# Patient Record
Sex: MALE | Race: WHITE | NOT HISPANIC OR LATINO | Employment: OTHER | URBAN - METROPOLITAN AREA
[De-identification: names, ages, dates, MRNs, and addresses within clinical notes are randomized per-mention and may not be internally consistent; named-entity substitution may affect disease eponyms.]

---

## 2021-05-26 ENCOUNTER — PRE-ADMISSION TESTING (OUTPATIENT)
Dept: ADMISSIONS | Facility: MEDICAL CENTER | Age: 71
End: 2021-05-26
Attending: UROLOGY
Payer: MEDICARE

## 2021-05-26 DIAGNOSIS — Z01.812 PRE-OPERATIVE LABORATORY EXAMINATION: ICD-10-CM

## 2021-05-26 DIAGNOSIS — Z01.810 PRE-OPERATIVE CARDIOVASCULAR EXAMINATION: ICD-10-CM

## 2021-05-26 LAB
ANION GAP SERPL CALC-SCNC: 8 MMOL/L (ref 7–16)
APPEARANCE UR: CLEAR
BASOPHILS # BLD AUTO: 0.6 % (ref 0–1.8)
BASOPHILS # BLD: 0.03 K/UL (ref 0–0.12)
BILIRUB UR QL STRIP.AUTO: NEGATIVE
BUN SERPL-MCNC: 11 MG/DL (ref 8–22)
CALCIUM SERPL-MCNC: 9.4 MG/DL (ref 8.5–10.5)
CHLORIDE SERPL-SCNC: 108 MMOL/L (ref 96–112)
CO2 SERPL-SCNC: 24 MMOL/L (ref 20–33)
COLOR UR: YELLOW
CREAT SERPL-MCNC: 0.69 MG/DL (ref 0.5–1.4)
EKG IMPRESSION: NORMAL
EOSINOPHIL # BLD AUTO: 0.19 K/UL (ref 0–0.51)
EOSINOPHIL NFR BLD: 3.6 % (ref 0–6.9)
ERYTHROCYTE [DISTWIDTH] IN BLOOD BY AUTOMATED COUNT: 42.4 FL (ref 35.9–50)
GLUCOSE SERPL-MCNC: 138 MG/DL (ref 65–99)
GLUCOSE UR STRIP.AUTO-MCNC: NEGATIVE MG/DL
HCT VFR BLD AUTO: 40.7 % (ref 42–52)
HGB BLD-MCNC: 13.8 G/DL (ref 14–18)
IMM GRANULOCYTES # BLD AUTO: 0.01 K/UL (ref 0–0.11)
IMM GRANULOCYTES NFR BLD AUTO: 0.2 % (ref 0–0.9)
INR PPP: 0.99 (ref 0.87–1.13)
KETONES UR STRIP.AUTO-MCNC: NEGATIVE MG/DL
LEUKOCYTE ESTERASE UR QL STRIP.AUTO: NEGATIVE
LYMPHOCYTES # BLD AUTO: 1.11 K/UL (ref 1–4.8)
LYMPHOCYTES NFR BLD: 20.8 % (ref 22–41)
MCH RBC QN AUTO: 31.9 PG (ref 27–33)
MCHC RBC AUTO-ENTMCNC: 33.9 G/DL (ref 33.7–35.3)
MCV RBC AUTO: 94 FL (ref 81.4–97.8)
MICRO URNS: NORMAL
MONOCYTES # BLD AUTO: 0.64 K/UL (ref 0–0.85)
MONOCYTES NFR BLD AUTO: 12 % (ref 0–13.4)
NEUTROPHILS # BLD AUTO: 3.35 K/UL (ref 1.82–7.42)
NEUTROPHILS NFR BLD: 62.8 % (ref 44–72)
NITRITE UR QL STRIP.AUTO: NEGATIVE
NRBC # BLD AUTO: 0 K/UL
NRBC BLD-RTO: 0 /100 WBC
PH UR STRIP.AUTO: 6 [PH] (ref 5–8)
PLATELET # BLD AUTO: 220 K/UL (ref 164–446)
PMV BLD AUTO: 10.1 FL (ref 9–12.9)
POTASSIUM SERPL-SCNC: 4.2 MMOL/L (ref 3.6–5.5)
PROT UR QL STRIP: NEGATIVE MG/DL
PROTHROMBIN TIME: 13.4 SEC (ref 12–14.6)
RBC # BLD AUTO: 4.33 M/UL (ref 4.7–6.1)
RBC UR QL AUTO: NEGATIVE
SODIUM SERPL-SCNC: 140 MMOL/L (ref 135–145)
SP GR UR STRIP.AUTO: 1.01
UROBILINOGEN UR STRIP.AUTO-MCNC: 0.2 MG/DL
WBC # BLD AUTO: 5.3 K/UL (ref 4.8–10.8)

## 2021-05-26 PROCEDURE — 81003 URINALYSIS AUTO W/O SCOPE: CPT

## 2021-05-26 PROCEDURE — 87086 URINE CULTURE/COLONY COUNT: CPT

## 2021-05-26 PROCEDURE — 93010 ELECTROCARDIOGRAM REPORT: CPT | Performed by: INTERNAL MEDICINE

## 2021-05-26 PROCEDURE — 93005 ELECTROCARDIOGRAM TRACING: CPT

## 2021-05-26 PROCEDURE — 36415 COLL VENOUS BLD VENIPUNCTURE: CPT

## 2021-05-26 PROCEDURE — 80048 BASIC METABOLIC PNL TOTAL CA: CPT

## 2021-05-26 PROCEDURE — 85610 PROTHROMBIN TIME: CPT

## 2021-05-26 PROCEDURE — 85025 COMPLETE CBC W/AUTO DIFF WBC: CPT

## 2021-05-26 RX ORDER — ATORVASTATIN CALCIUM 10 MG/1
TABLET, FILM COATED ORAL
COMMUNITY
Start: 2021-04-16

## 2021-05-26 RX ORDER — MULTIVIT-MIN/IRON/FOLIC ACID/K 18-600-40
1 CAPSULE ORAL DAILY
COMMUNITY

## 2021-05-26 RX ORDER — CLINDAMYCIN HYDROCHLORIDE 300 MG/1
CAPSULE ORAL
Status: ON HOLD | COMMUNITY
End: 2021-06-11

## 2021-05-26 RX ORDER — ACETAMINOPHEN 500 MG
500-1000 TABLET ORAL EVERY 6 HOURS PRN
COMMUNITY

## 2021-05-26 RX ORDER — IBUPROFEN 200 MG
200 TABLET ORAL EVERY 6 HOURS PRN
COMMUNITY

## 2021-05-26 RX ORDER — CIPROFLOXACIN 500 MG/1
TABLET, FILM COATED ORAL
Status: ON HOLD | COMMUNITY
End: 2021-06-11

## 2021-05-26 RX ORDER — LANOLIN ALCOHOL/MO/W.PET/CERES
1000 CREAM (GRAM) TOPICAL DAILY
COMMUNITY

## 2021-05-26 RX ORDER — TAMSULOSIN HYDROCHLORIDE 0.4 MG/1
0.8 CAPSULE ORAL DAILY
COMMUNITY
Start: 2021-05-20

## 2021-05-26 NOTE — OR NURSING
Pt has surgery 6/11, is from out of town. Discussed need for COVID test during PAT appt. Pt unaware of need for testing. Pt states he will be out of town from 6/1-6/8 roughly, will try to come in 6/8 for COVID drive thru, appt made. However, pt states he might do test out of town sooner than 6/8, pt provided with call back number to change/cxl appt and provide us with testing location info. Pt also amenable to coming to PAT 6/10 for rapid test if unable to obtain test prior.

## 2021-05-28 LAB
BACTERIA UR CULT: NORMAL
SIGNIFICANT IND 70042: NORMAL
SITE SITE: NORMAL
SOURCE SOURCE: NORMAL

## 2021-06-08 ENCOUNTER — APPOINTMENT (OUTPATIENT)
Dept: ADMISSIONS | Facility: MEDICAL CENTER | Age: 71
End: 2021-06-08
Payer: MEDICARE

## 2021-06-08 DIAGNOSIS — Z01.812 PRE-OPERATIVE LABORATORY EXAMINATION: ICD-10-CM

## 2021-06-11 ENCOUNTER — ANESTHESIA EVENT (OUTPATIENT)
Dept: SURGERY | Facility: MEDICAL CENTER | Age: 71
End: 2021-06-11
Payer: MEDICARE

## 2021-06-11 ENCOUNTER — HOSPITAL ENCOUNTER (OUTPATIENT)
Facility: MEDICAL CENTER | Age: 71
End: 2021-06-12
Attending: UROLOGY | Admitting: UROLOGY
Payer: MEDICARE

## 2021-06-11 ENCOUNTER — ANESTHESIA (OUTPATIENT)
Dept: SURGERY | Facility: MEDICAL CENTER | Age: 71
End: 2021-06-11
Payer: MEDICARE

## 2021-06-11 PROCEDURE — A4346 CATH INDW FOLEY 3 WAY: HCPCS | Performed by: UROLOGY

## 2021-06-11 PROCEDURE — A4357 BEDSIDE DRAINAGE BAG: HCPCS | Performed by: UROLOGY

## 2021-06-11 PROCEDURE — 160009 HCHG ANES TIME/MIN: Performed by: UROLOGY

## 2021-06-11 PROCEDURE — 700102 HCHG RX REV CODE 250 W/ 637 OVERRIDE(OP): Performed by: UROLOGY

## 2021-06-11 PROCEDURE — 700102 HCHG RX REV CODE 250 W/ 637 OVERRIDE(OP): Performed by: ANESTHESIOLOGY

## 2021-06-11 PROCEDURE — 88305 TISSUE EXAM BY PATHOLOGIST: CPT

## 2021-06-11 PROCEDURE — 160039 HCHG SURGERY MINUTES - EA ADDL 1 MIN LEVEL 3: Performed by: UROLOGY

## 2021-06-11 PROCEDURE — 160035 HCHG PACU - 1ST 60 MINS PHASE I: Performed by: UROLOGY

## 2021-06-11 PROCEDURE — A9270 NON-COVERED ITEM OR SERVICE: HCPCS | Performed by: UROLOGY

## 2021-06-11 PROCEDURE — 700111 HCHG RX REV CODE 636 W/ 250 OVERRIDE (IP): Performed by: ANESTHESIOLOGY

## 2021-06-11 PROCEDURE — G0378 HOSPITAL OBSERVATION PER HR: HCPCS

## 2021-06-11 PROCEDURE — 160028 HCHG SURGERY MINUTES - 1ST 30 MINS LEVEL 3: Performed by: UROLOGY

## 2021-06-11 PROCEDURE — 700101 HCHG RX REV CODE 250: Performed by: UROLOGY

## 2021-06-11 PROCEDURE — 700105 HCHG RX REV CODE 258: Performed by: UROLOGY

## 2021-06-11 PROCEDURE — 160048 HCHG OR STATISTICAL LEVEL 1-5: Performed by: UROLOGY

## 2021-06-11 PROCEDURE — 700101 HCHG RX REV CODE 250: Performed by: ANESTHESIOLOGY

## 2021-06-11 PROCEDURE — A9270 NON-COVERED ITEM OR SERVICE: HCPCS | Performed by: ANESTHESIOLOGY

## 2021-06-11 PROCEDURE — 160002 HCHG RECOVERY MINUTES (STAT): Performed by: UROLOGY

## 2021-06-11 RX ORDER — HALOPERIDOL 5 MG/ML
1 INJECTION INTRAMUSCULAR
Status: DISCONTINUED | OUTPATIENT
Start: 2021-06-11 | End: 2021-06-11 | Stop reason: HOSPADM

## 2021-06-11 RX ORDER — ONDANSETRON 2 MG/ML
4 INJECTION INTRAMUSCULAR; INTRAVENOUS
Status: DISCONTINUED | OUTPATIENT
Start: 2021-06-11 | End: 2021-06-11 | Stop reason: HOSPADM

## 2021-06-11 RX ORDER — LABETALOL HYDROCHLORIDE 5 MG/ML
INJECTION, SOLUTION INTRAVENOUS PRN
Status: DISCONTINUED | OUTPATIENT
Start: 2021-06-11 | End: 2021-06-11 | Stop reason: SURG

## 2021-06-11 RX ORDER — ACETAMINOPHEN 500 MG
1000 TABLET ORAL ONCE
Status: DISCONTINUED | OUTPATIENT
Start: 2021-06-11 | End: 2021-06-11 | Stop reason: HOSPADM

## 2021-06-11 RX ORDER — DOCUSATE SODIUM 100 MG/1
100 CAPSULE, LIQUID FILLED ORAL 2 TIMES DAILY
Status: DISCONTINUED | OUTPATIENT
Start: 2021-06-11 | End: 2021-06-12 | Stop reason: HOSPADM

## 2021-06-11 RX ORDER — SODIUM PHOSPHATE,MONO-DIBASIC 19G-7G/118
500 ENEMA (ML) RECTAL DAILY
Status: DISCONTINUED | OUTPATIENT
Start: 2021-06-12 | End: 2021-06-12 | Stop reason: HOSPADM

## 2021-06-11 RX ORDER — SUCCINYLCHOLINE CHLORIDE 20 MG/ML
INJECTION INTRAMUSCULAR; INTRAVENOUS PRN
Status: DISCONTINUED | OUTPATIENT
Start: 2021-06-11 | End: 2021-06-11 | Stop reason: SURG

## 2021-06-11 RX ORDER — IBUPROFEN 800 MG/1
800 TABLET ORAL 3 TIMES DAILY PRN
Status: DISCONTINUED | OUTPATIENT
Start: 2021-06-16 | End: 2021-06-12 | Stop reason: HOSPADM

## 2021-06-11 RX ORDER — MIDAZOLAM HYDROCHLORIDE 1 MG/ML
1 INJECTION INTRAMUSCULAR; INTRAVENOUS
Status: DISCONTINUED | OUTPATIENT
Start: 2021-06-11 | End: 2021-06-11 | Stop reason: HOSPADM

## 2021-06-11 RX ORDER — ATROPA BELLADONNA AND OPIUM 16.2; 6 MG/1; MG/1
SUPPOSITORY RECTAL
Status: DISCONTINUED | OUTPATIENT
Start: 2021-06-11 | End: 2021-06-11 | Stop reason: HOSPADM

## 2021-06-11 RX ORDER — ONDANSETRON 2 MG/ML
4 INJECTION INTRAMUSCULAR; INTRAVENOUS EVERY 4 HOURS PRN
Status: DISCONTINUED | OUTPATIENT
Start: 2021-06-11 | End: 2021-06-12 | Stop reason: HOSPADM

## 2021-06-11 RX ORDER — CEFAZOLIN SODIUM 1 G/3ML
INJECTION, POWDER, FOR SOLUTION INTRAMUSCULAR; INTRAVENOUS PRN
Status: DISCONTINUED | OUTPATIENT
Start: 2021-06-11 | End: 2021-06-11 | Stop reason: SURG

## 2021-06-11 RX ORDER — OXYCODONE HCL 5 MG/5 ML
5 SOLUTION, ORAL ORAL
Status: DISCONTINUED | OUTPATIENT
Start: 2021-06-11 | End: 2021-06-11 | Stop reason: HOSPADM

## 2021-06-11 RX ORDER — DIPHENHYDRAMINE HYDROCHLORIDE 50 MG/ML
12.5 INJECTION INTRAMUSCULAR; INTRAVENOUS
Status: DISCONTINUED | OUTPATIENT
Start: 2021-06-11 | End: 2021-06-11 | Stop reason: HOSPADM

## 2021-06-11 RX ORDER — DEXAMETHASONE SODIUM PHOSPHATE 4 MG/ML
4 INJECTION, SOLUTION INTRA-ARTICULAR; INTRALESIONAL; INTRAMUSCULAR; INTRAVENOUS; SOFT TISSUE
Status: DISCONTINUED | OUTPATIENT
Start: 2021-06-11 | End: 2021-06-12 | Stop reason: HOSPADM

## 2021-06-11 RX ORDER — UBIDECARENONE 75 MG
1000 CAPSULE ORAL DAILY
Status: DISCONTINUED | OUTPATIENT
Start: 2021-06-12 | End: 2021-06-12 | Stop reason: HOSPADM

## 2021-06-11 RX ORDER — ATORVASTATIN CALCIUM 10 MG/1
10 TABLET, FILM COATED ORAL DAILY
Status: DISCONTINUED | OUTPATIENT
Start: 2021-06-11 | End: 2021-06-12 | Stop reason: HOSPADM

## 2021-06-11 RX ORDER — SODIUM CHLORIDE, SODIUM LACTATE, POTASSIUM CHLORIDE, CALCIUM CHLORIDE 600; 310; 30; 20 MG/100ML; MG/100ML; MG/100ML; MG/100ML
1000 INJECTION, SOLUTION INTRAVENOUS CONTINUOUS
Status: ACTIVE | OUTPATIENT
Start: 2021-06-11 | End: 2021-06-11

## 2021-06-11 RX ORDER — LIDOCAINE HYDROCHLORIDE 20 MG/ML
INJECTION, SOLUTION EPIDURAL; INFILTRATION; INTRACAUDAL; PERINEURAL PRN
Status: DISCONTINUED | OUTPATIENT
Start: 2021-06-11 | End: 2021-06-11 | Stop reason: SURG

## 2021-06-11 RX ORDER — HALOPERIDOL 5 MG/ML
1 INJECTION INTRAMUSCULAR EVERY 6 HOURS PRN
Status: DISCONTINUED | OUTPATIENT
Start: 2021-06-11 | End: 2021-06-12 | Stop reason: HOSPADM

## 2021-06-11 RX ORDER — MEPERIDINE HYDROCHLORIDE 25 MG/ML
25 INJECTION INTRAMUSCULAR; INTRAVENOUS; SUBCUTANEOUS
Status: DISCONTINUED | OUTPATIENT
Start: 2021-06-11 | End: 2021-06-11 | Stop reason: HOSPADM

## 2021-06-11 RX ORDER — SODIUM CHLORIDE, SODIUM LACTATE, POTASSIUM CHLORIDE, CALCIUM CHLORIDE 600; 310; 30; 20 MG/100ML; MG/100ML; MG/100ML; MG/100ML
INJECTION, SOLUTION INTRAVENOUS CONTINUOUS
Status: DISCONTINUED | OUTPATIENT
Start: 2021-06-11 | End: 2021-06-11 | Stop reason: HOSPADM

## 2021-06-11 RX ORDER — IBUPROFEN 800 MG/1
800 TABLET ORAL 3 TIMES DAILY
Status: DISCONTINUED | OUTPATIENT
Start: 2021-06-11 | End: 2021-06-12 | Stop reason: HOSPADM

## 2021-06-11 RX ORDER — ACETAMINOPHEN 325 MG/1
650 TABLET ORAL EVERY 6 HOURS
Status: DISCONTINUED | OUTPATIENT
Start: 2021-06-11 | End: 2021-06-12 | Stop reason: HOSPADM

## 2021-06-11 RX ORDER — DEXAMETHASONE SODIUM PHOSPHATE 4 MG/ML
INJECTION, SOLUTION INTRA-ARTICULAR; INTRALESIONAL; INTRAMUSCULAR; INTRAVENOUS; SOFT TISSUE PRN
Status: DISCONTINUED | OUTPATIENT
Start: 2021-06-11 | End: 2021-06-11 | Stop reason: SURG

## 2021-06-11 RX ORDER — OXYCODONE HCL 5 MG/5 ML
10 SOLUTION, ORAL ORAL
Status: DISCONTINUED | OUTPATIENT
Start: 2021-06-11 | End: 2021-06-11 | Stop reason: HOSPADM

## 2021-06-11 RX ORDER — DIPHENHYDRAMINE HYDROCHLORIDE 50 MG/ML
25 INJECTION INTRAMUSCULAR; INTRAVENOUS EVERY 6 HOURS PRN
Status: DISCONTINUED | OUTPATIENT
Start: 2021-06-11 | End: 2021-06-12 | Stop reason: HOSPADM

## 2021-06-11 RX ORDER — ASCORBIC ACID 500 MG
500 TABLET ORAL DAILY
Status: DISCONTINUED | OUTPATIENT
Start: 2021-06-12 | End: 2021-06-12 | Stop reason: HOSPADM

## 2021-06-11 RX ORDER — SODIUM CHLORIDE, SODIUM LACTATE, POTASSIUM CHLORIDE, CALCIUM CHLORIDE 600; 310; 30; 20 MG/100ML; MG/100ML; MG/100ML; MG/100ML
INJECTION, SOLUTION INTRAVENOUS CONTINUOUS
Status: ACTIVE | OUTPATIENT
Start: 2021-06-11 | End: 2021-06-11

## 2021-06-11 RX ORDER — ATROPA BELLADONNA AND OPIUM 16.2; 6 MG/1; MG/1
30 SUPPOSITORY RECTAL EVERY 8 HOURS PRN
Status: DISCONTINUED | OUTPATIENT
Start: 2021-06-11 | End: 2021-06-12 | Stop reason: HOSPADM

## 2021-06-11 RX ORDER — ACETAMINOPHEN 500 MG
1000 TABLET ORAL ONCE
Status: COMPLETED | OUTPATIENT
Start: 2021-06-11 | End: 2021-06-11

## 2021-06-11 RX ORDER — MEPERIDINE HYDROCHLORIDE 25 MG/ML
INJECTION INTRAMUSCULAR; INTRAVENOUS; SUBCUTANEOUS PRN
Status: DISCONTINUED | OUTPATIENT
Start: 2021-06-11 | End: 2021-06-11 | Stop reason: SURG

## 2021-06-11 RX ORDER — SCOLOPAMINE TRANSDERMAL SYSTEM 1 MG/1
1 PATCH, EXTENDED RELEASE TRANSDERMAL
Status: DISCONTINUED | OUTPATIENT
Start: 2021-06-11 | End: 2021-06-12 | Stop reason: HOSPADM

## 2021-06-11 RX ADMIN — MEPERIDINE HYDROCHLORIDE 12.5 MG: 25 INJECTION INTRAMUSCULAR; INTRAVENOUS; SUBCUTANEOUS at 14:35

## 2021-06-11 RX ADMIN — LIDOCAINE HYDROCHLORIDE 20 MG: 20 INJECTION, SOLUTION EPIDURAL; INFILTRATION; INTRACAUDAL at 14:34

## 2021-06-11 RX ADMIN — ATORVASTATIN CALCIUM 10 MG: 10 TABLET, FILM COATED ORAL at 18:32

## 2021-06-11 RX ADMIN — SUCCINYLCHOLINE CHLORIDE 5 MG: 20 INJECTION, SOLUTION INTRAMUSCULAR; INTRAVENOUS; PARENTERAL at 14:34

## 2021-06-11 RX ADMIN — CEFAZOLIN 2 G: 330 INJECTION, POWDER, FOR SOLUTION INTRAMUSCULAR; INTRAVENOUS at 14:31

## 2021-06-11 RX ADMIN — DEXAMETHASONE SODIUM PHOSPHATE 4 MG: 4 INJECTION, SOLUTION INTRA-ARTICULAR; INTRALESIONAL; INTRAMUSCULAR; INTRAVENOUS; SOFT TISSUE at 14:43

## 2021-06-11 RX ADMIN — ACETAMINOPHEN 1000 MG: 500 TABLET ORAL at 12:37

## 2021-06-11 RX ADMIN — SUCCINYLCHOLINE CHLORIDE 40 MG: 20 INJECTION, SOLUTION INTRAMUSCULAR; INTRAVENOUS; PARENTERAL at 14:36

## 2021-06-11 RX ADMIN — LABETALOL HYDROCHLORIDE 10 MG: 5 INJECTION, SOLUTION INTRAVENOUS at 16:03

## 2021-06-11 RX ADMIN — DOCUSATE SODIUM 100 MG: 100 CAPSULE, LIQUID FILLED ORAL at 18:32

## 2021-06-11 RX ADMIN — MEPERIDINE HYDROCHLORIDE 12.5 MG: 25 INJECTION INTRAMUSCULAR; INTRAVENOUS; SUBCUTANEOUS at 14:43

## 2021-06-11 RX ADMIN — LIDOCAINE HYDROCHLORIDE 0.5 ML: 10 INJECTION, SOLUTION EPIDURAL; INFILTRATION; INTRACAUDAL at 12:37

## 2021-06-11 RX ADMIN — PROPOFOL 20 MG: 10 INJECTION, EMULSION INTRAVENOUS at 14:34

## 2021-06-11 RX ADMIN — ACETAMINOPHEN 650 MG: 325 TABLET, FILM COATED ORAL at 18:32

## 2021-06-11 RX ADMIN — POVIDONE IODINE 15 ML: 100 SOLUTION TOPICAL at 12:38

## 2021-06-11 RX ADMIN — SODIUM CHLORIDE, POTASSIUM CHLORIDE, SODIUM LACTATE AND CALCIUM CHLORIDE: 600; 310; 30; 20 INJECTION, SOLUTION INTRAVENOUS at 12:38

## 2021-06-11 RX ADMIN — SODIUM CHLORIDE, POTASSIUM CHLORIDE, SODIUM LACTATE AND CALCIUM CHLORIDE 1000 ML: 600; 310; 30; 20 INJECTION, SOLUTION INTRAVENOUS at 18:31

## 2021-06-11 RX ADMIN — PROPOFOL 40 MG: 10 INJECTION, EMULSION INTRAVENOUS at 14:36

## 2021-06-11 ASSESSMENT — LIFESTYLE VARIABLES
TOTAL SCORE: 0
TOTAL SCORE: 0
DOES PATIENT WANT TO STOP DRINKING: NO
HOW MANY TIMES IN THE PAST YEAR HAVE YOU HAD 5 OR MORE DRINKS IN A DAY: 0
HAVE PEOPLE ANNOYED YOU BY CRITICIZING YOUR DRINKING: NO
CONSUMPTION TOTAL: NEGATIVE
EVER HAD A DRINK FIRST THING IN THE MORNING TO STEADY YOUR NERVES TO GET RID OF A HANGOVER: NO
ALCOHOL_USE: NO
ON A TYPICAL DAY WHEN YOU DRINK ALCOHOL HOW MANY DRINKS DO YOU HAVE: 0
EVER FELT BAD OR GUILTY ABOUT YOUR DRINKING: NO
AVERAGE NUMBER OF DAYS PER WEEK YOU HAVE A DRINK CONTAINING ALCOHOL: 0
HAVE YOU EVER FELT YOU SHOULD CUT DOWN ON YOUR DRINKING: NO
TOTAL SCORE: 0

## 2021-06-11 ASSESSMENT — COGNITIVE AND FUNCTIONAL STATUS - GENERAL
TURNING FROM BACK TO SIDE WHILE IN FLAT BAD: A LITTLE
WALKING IN HOSPITAL ROOM: A LITTLE
MOBILITY SCORE: 18
STANDING UP FROM CHAIR USING ARMS: A LITTLE
HELP NEEDED FOR BATHING: A LITTLE
DAILY ACTIVITIY SCORE: 23
SUGGESTED CMS G CODE MODIFIER MOBILITY: CK
CLIMB 3 TO 5 STEPS WITH RAILING: A LITTLE
MOVING TO AND FROM BED TO CHAIR: A LITTLE
SUGGESTED CMS G CODE MODIFIER DAILY ACTIVITY: CI
MOVING FROM LYING ON BACK TO SITTING ON SIDE OF FLAT BED: A LITTLE

## 2021-06-11 ASSESSMENT — PATIENT HEALTH QUESTIONNAIRE - PHQ9
2. FEELING DOWN, DEPRESSED, IRRITABLE, OR HOPELESS: NOT AT ALL
SUM OF ALL RESPONSES TO PHQ9 QUESTIONS 1 AND 2: 0
1. LITTLE INTEREST OR PLEASURE IN DOING THINGS: NOT AT ALL

## 2021-06-11 ASSESSMENT — PAIN DESCRIPTION - PAIN TYPE
TYPE: SURGICAL PAIN

## 2021-06-11 ASSESSMENT — PAIN SCALES - GENERAL: PAIN_LEVEL: 0

## 2021-06-11 NOTE — OR SURGEON
Immediate Post OP Note    PreOp Diagnosis: BPH with Severe LUTS                                History of urinary retention      PostOp Diagnosis: As above      Procedure(s):  BIPOLAR TURP. - Wound Class: Clean Contaminated    Surgeon(s):  Massimo Gee M.D.    Anesthesiologist/Type of Anesthesia:  Anesthesiologist: Massimo Esposito M.D./General LMA    Surgical Staff:  Circulator: Keena Wilburn R.N.  Relief Scrub: Arnold Moser R.N.  Scrub Person: Ty Bonilla    Specimens removed if any:  ID Type Source Tests Collected by Time Destination   A : PROSTATE CHIPS Tissue Prostate PATHOLOGY SPECIMEN Massimo Gee M.D. 6/11/2021 1452        Estimated Blood Loss: 250ml    Findings: 6.5 cm large prostate with     Complications: None  Drains: [address] medina to CBI        6/11/2021 4:25 PM Massimo Gee M.D.

## 2021-06-11 NOTE — ANESTHESIA POSTPROCEDURE EVALUATION
Patient: Arya Gamez    Procedure Summary     Date: 06/11/21 Room / Location: [place] [address] / SURGERY [facility]    Anesthesia Start: 1431 Anesthesia Stop: 1619    Procedure: TURP, USING BUTTON ELECTRODE-BIPOLAR. (N/A Urethra) Diagnosis: (LOWER URINARY TRACT SYMPTOMS DUE TO BENIGN PROSTATIC HYPERTROPHY)    Surgeons: Massimo Gee M.D. Responsible Provider: Massimo Esposito M.D.    Anesthesia Type: general ASA Status: 2          Final Anesthesia Type: general  Last vitals  BP   Blood Pressure : 109/85    Temp   36.6 °C (97.8 °F)    Pulse   (!) 109 (RN notified)   Resp   18    SpO2   95 %      Anesthesia Post Evaluation    Patient location during evaluation: PACU  Patient participation: complete - patient participated  Level of consciousness: awake and alert  Pain score: 0    Airway patency: patent  Anesthetic complications: no  Cardiovascular status: hemodynamically stable  Respiratory status: acceptable  Hydration status: euvolemic    PONV: none          No complications documented.     Nurse Pain Score: 0 (NPRS)

## 2021-06-11 NOTE — ANESTHESIA TIME REPORT
Anesthesia Start and Stop Event Times     Date Time Event    6/11/2021 1306 Ready for Procedure     1431 Anesthesia Start     1619 Anesthesia Stop        Responsible Staff  06/11/21    Name Role Begin End    Massimo Esposito M.D. Anesth 1431 1619        Preop Diagnosis (Free Text):  Pre-op Diagnosis     LOWER URINARY TRACT SYMPTOMS DUE TO BENIGN PROSTATIC HYPERTROPHY        Preop Diagnosis (Codes):    Post op Diagnosis  BPH (benign prostatic hyperplasia)      Premium Reason  A. 3PM - 7AM    Comments:

## 2021-06-11 NOTE — PROGRESS NOTES
Med Rec completed: per pt at bedside     No ORAL antibiotics in last 14 days    Pt confirmed following allergies:  Allergies   Allergen Reactions   • Penicillins      Childhood reaction

## 2021-06-11 NOTE — ANESTHESIA PREPROCEDURE EVALUATION
Relevant Problems   No relevant active problems       Physical Exam    Airway   Mallampati: II  TM distance: >3 FB  Neck ROM: full       Cardiovascular - normal exam  Rhythm: regular  Rate: normal     Dental - normal exam           Pulmonary - normal exam  Breath sounds clear to auscultation     Abdominal    Neurological - normal exam                 Anesthesia Plan    ASA 2       Plan - general       Airway plan will be LMA          Induction: intravenous          Informed Consent:    Anesthetic plan and risks discussed with patient.

## 2021-06-11 NOTE — ANESTHESIA PROCEDURE NOTES
Airway    Date/Time: 6/11/2021 2:38 PM  Performed by: Massimo Esposito M.D.  Authorized by: Massimo Esposito M.D.     Location:  OR  Urgency:  Elective  Indications for Airway Management:  Anesthesia      Spontaneous Ventilation: absent    Sedation Level:  Deep  Preoxygenated: Yes    Patient Position:  Sniffing  Final Airway Type:  Supraglottic airway  Final Supraglottic Airway:  Standard LMA    SGA Size:  4  Number of Attempts at Approach:  1

## 2021-06-12 VITALS
SYSTOLIC BLOOD PRESSURE: 121 MMHG | DIASTOLIC BLOOD PRESSURE: 79 MMHG | WEIGHT: 164.02 LBS | OXYGEN SATURATION: 95 % | RESPIRATION RATE: 16 BRPM | TEMPERATURE: 98.2 F | HEART RATE: 72 BPM | HEIGHT: 68 IN | BODY MASS INDEX: 24.86 KG/M2

## 2021-06-12 LAB
ANION GAP SERPL CALC-SCNC: 7 MMOL/L (ref 7–16)
BUN SERPL-MCNC: 12 MG/DL (ref 8–22)
CALCIUM SERPL-MCNC: 9.1 MG/DL (ref 8.5–10.5)
CHLORIDE SERPL-SCNC: 105 MMOL/L (ref 96–112)
CO2 SERPL-SCNC: 25 MMOL/L (ref 20–33)
CREAT SERPL-MCNC: 0.51 MG/DL (ref 0.5–1.4)
ERYTHROCYTE [DISTWIDTH] IN BLOOD BY AUTOMATED COUNT: 40.3 FL (ref 35.9–50)
GLUCOSE SERPL-MCNC: 116 MG/DL (ref 65–99)
HCT VFR BLD AUTO: 38.1 % (ref 42–52)
HGB BLD-MCNC: 12.7 G/DL (ref 14–18)
MCH RBC QN AUTO: 30.9 PG (ref 27–33)
MCHC RBC AUTO-ENTMCNC: 33.3 G/DL (ref 33.7–35.3)
MCV RBC AUTO: 92.7 FL (ref 81.4–97.8)
PLATELET # BLD AUTO: 203 K/UL (ref 164–446)
PMV BLD AUTO: 10.8 FL (ref 9–12.9)
POTASSIUM SERPL-SCNC: 4.2 MMOL/L (ref 3.6–5.5)
RBC # BLD AUTO: 4.11 M/UL (ref 4.7–6.1)
SODIUM SERPL-SCNC: 137 MMOL/L (ref 135–145)
WBC # BLD AUTO: 7.7 K/UL (ref 4.8–10.8)

## 2021-06-12 PROCEDURE — G0378 HOSPITAL OBSERVATION PER HR: HCPCS

## 2021-06-12 PROCEDURE — 80048 BASIC METABOLIC PNL TOTAL CA: CPT

## 2021-06-12 PROCEDURE — 51798 US URINE CAPACITY MEASURE: CPT

## 2021-06-12 PROCEDURE — 700102 HCHG RX REV CODE 250 W/ 637 OVERRIDE(OP): Performed by: UROLOGY

## 2021-06-12 PROCEDURE — A9270 NON-COVERED ITEM OR SERVICE: HCPCS | Performed by: UROLOGY

## 2021-06-12 PROCEDURE — 36415 COLL VENOUS BLD VENIPUNCTURE: CPT

## 2021-06-12 PROCEDURE — 85027 COMPLETE CBC AUTOMATED: CPT

## 2021-06-12 RX ADMIN — Medication 500 MG: at 06:05

## 2021-06-12 RX ADMIN — OXYCODONE HYDROCHLORIDE AND ACETAMINOPHEN 500 MG: 500 TABLET ORAL at 06:04

## 2021-06-12 RX ADMIN — ACETAMINOPHEN 650 MG: 325 TABLET, FILM COATED ORAL at 00:21

## 2021-06-12 RX ADMIN — DOCUSATE SODIUM 100 MG: 100 CAPSULE, LIQUID FILLED ORAL at 06:04

## 2021-06-12 RX ADMIN — ACETAMINOPHEN 650 MG: 325 TABLET, FILM COATED ORAL at 06:05

## 2021-06-12 ASSESSMENT — PAIN DESCRIPTION - PAIN TYPE
TYPE: SURGICAL PAIN
TYPE: SURGICAL PAIN

## 2021-06-12 NOTE — OP REPORT
DATE OF SERVICE:  06/11/2021     PREOPERATIVE DIAGNOSES:  1.  Benign prostatic hypertrophy with lower urinary tract symptoms.  2.  History of urinary retention.     OPERATIONS AND PROCEDURES PERFORMED:  1.  Rigid cystourethroscopy.  2.  Transurethral resection of prostate with bipolar technique.     SURGEON:  Massimo Gee MD     ANESTHESIA:  General laryngeal mask.     ANESTHESIOLOGIST:  Massimo Esposito MD     POSTOPERATIVE DIAGNOSES:  1.  Benign prostatic hypertrophy with lower urinary tract symptoms.  2.  History of urinary retention.     COMPLICATIONS:  None.     DRAINS:  A 24-Trinidadian 3-way Chambers to continuous bladder irrigation with normal   saline.     SPECIMENS:  Prostatic chips to pathology for permanent section.     INDICATIONS:  The patient is a gentleman who, approximately 6 weeks ago,   developed urinary retention.  He had a catheter placed at an outside facility,   original voiding trial failed requiring replacement of the catheter.  He has   been on tamsulosin and is now catheter free, but was seen in the office,   evaluated with cystoscopy and found to have a very large prostate on the order   of  grams and I discussed with the patient after cystoscopy the option   for simple prostatectomy versus transurethral resection of the prostate.  He   would like to proceed with transurethral resection of the prostate and I   discussed the risk of the procedure in detail including but not limited to   risk of perioperative urinary tract infection, urosepsis, risk of urethral   stricture as a delayed complication of instrumentation, risk of bladder neck   contracture in 3-5% of cases.  He is aware that he may have permanent   retrograde ejaculation in 75% of the cases and I explained to him that this   is.  We discussed the risk of failure to improve his lower urinary tract   symptoms if he has a predominant overactive bladder or that it will take some   time and the risk of incontinence in 1% of cases.  I  have also discussed the   perioperative risk of bleeding, the possibility of requiring a transfusion,   the risk of delayed bleeding and the risk of deep vein thrombosis, pulmonary   embolism, aspiration pneumonia, heart attack, stroke and death.  Informed   consent was given to me by the patient to proceed.     DESCRIPTION OF PROCEDURE:  After informed consent was obtained, the patient   was brought to the operating room and placed supine.  A general laryngeal mask   anesthetic was administered in a balanced fashion.  The patient received   intravenous antibiotics.  Bilateral sequential compression device in place and   operational.  He was positioned in modified lithotomy and the operative area   was Betadine prepped and draped in the usual sterile fashion.  A surgical   timeout was called.  All members of the operative team agree as the patient's   name, procedure to be performed without objections. Attention was directed to   the procedure.  I began the procedure by dilating the urethra with Anant   sounds gently from 22-Turkmen to 26-Turkmen.  I then passed a 26-Turkmen   resectoscope with the visualizing obturator per urethra.  The anterior urethra   was normal.  The prostatic fossa measured 6.5 cm in length with lateral lobe   hypertrophy, which was occlusive and a slight median bar.  Upon entering the   bladder, serial evaluation shows the left and right ureteral orifices to be   orthotopic.  He has 2+ trabeculation with cellules at the dome of the bladder,   no stones or tumors were seen.  At this point in time, I pulled the   resectoscope with the loop and bipolar current was used to regine the level of   the verumontanum at the level of the anterior commissure.  I began the   resection resecting the anterior commissure to the surgical capsule.  Bleeding   points were cauterized.  There was a very vascular gland at the bladder neck.    After getting control of the bladder neck bleeders, I resected from 12    o'clock to 5 o'clock to the surgical capsule.  I then resected from 12 o'clock   to 7 o'clock and after resecting the lateral lobes, the floor was resected,   sparing the verumontanum and the apical tissue was removed.  An Ellik   evacuator was used to remove the prostatic chips and the estimated blood loss   was approximately 150 mL. I did use a combination technique of the loop and   used the button electrode with coagulation current for hemostasis. At the end   of the case, hemostasis was good.  General inspection of bladder showed no   residual chips.  There was certainly no abnormalities and the ureteral   orifices were uninvolved in the resection. With the bladder full, I withdrew   the scope, he had a good stream.  I then advanced a 24-Turkish 3-way Chambers,   inflated the balloon with 30 mL of sterile water and left the balloon to   slight traction to the leg with pink tape for compression of the bladder neck   bleeders.  At the end of the case, I did position a 60 mg belladonna and opium   suppository for anticipated bladder spasm.  At the end of the case, he   tolerated the procedure well without complication.  He was awakened in the   operating room and transferred to recovery room where he arrived in stable   condition.        ______________________________  MD NAYANA Bey/MANDI    DD:  06/12/2021 07:52  DT:  06/12/2021 08:40    Job#:  [id number]

## 2021-06-12 NOTE — PROGRESS NOTES
Assumed care at 1845. Pt resting in bed. A&0x4  Ambulated in hallways x 1 assist  O2 weaned to RA  Tolerating clears. Advance to reg in AM  Chambers catheter in place. CBI infusing  +flatus. No bm yet  Pain controlled with tylenol  Call light within reach. Hourly rounding in place

## 2021-06-12 NOTE — PROGRESS NOTES
Discharging patient home per physician order. Discharged with ex-wife. Demonstrated understanding of discharge instructions, follow up appointments, home medications. Verbalized understanding of discharge instructions and educational handouts. Stated several reasons why to return to ED or seek medical attention. All questions answered.  All belongings with patient at time of discharge.

## 2021-06-12 NOTE — CARE PLAN
Problem: Knowledge Deficit - Standard  Goal: Patient and family/care givers will demonstrate understanding of plan of care, disease process/condition, diagnostic tests and medications  Outcome: Progressing     Problem: Early Mobilization - Post Surgery  Goal: Early mobilization post surgery  Outcome: Progressing     Problem: Pain - Post Surgery  Goal: Alleviation or reduction of pain post surgery  Outcome: Progressing       The patient is Stable - Low risk of patient condition declining or worsening    Shift Goals  Clinical Goals: void without complications, discharge    Progress made toward(s) clinical / shift goals:  Pt able to void post medina removal. Pt to be discharged today.    Patient is not progressing towards the following goals:

## 2021-06-12 NOTE — PROGRESS NOTES
AA&Ox4.   RA. Denies SOB.  Denies any pain  Tolerating regular diet. Denies N/V.  Chambers DCd per order. Hand irrigated prior to removal - no clots noted. Awaiting void at this time.   + BM.   Pt up with SBA.  All needs met at this time. Call light within reach. Pt calls appropriately.

## 2021-06-12 NOTE — CARE PLAN
The patient is Stable - Low risk of patient condition declining or worsening    Shift Goals  Clinical Goals: ambulation and pain control    Progress made toward(s) clinical / shift goals:  pain controlled with just schedule tylenol q6 hours. Ambulates with unsteady gait    Patient is not progressing towards the following goals:      Problem: Early Mobilization - Post Surgery  Goal: Early mobilization post surgery  Outcome: Progressing     Problem: Pain - Post Surgery  Goal: Alleviation or reduction of pain post surgery  Outcome: Progressing

## 2021-06-12 NOTE — DISCHARGE INSTRUCTIONS
Transurethral Resection of the Prostate, Care After  This sheet gives you information about how to care for yourself after your procedure. Your health care provider may also give you more specific instructions. If you have problems or questions, contact your health care provider.  What can I expect after the procedure?  After the procedure, it is common to have:  · Mild pain in your lower abdomen.  · Soreness or mild discomfort in your penis from having the catheter inserted during the procedure.  · A feeling of urgency when you need to urinate.  · A small amount of blood in your urine. You may notice some small blood clots in your urine. These are normal.  Follow these instructions at home:  Medicines  · Take over-the-counter and prescription medicines only as told by your health care provider.  · If you were prescribed an antibiotic medicine, take it as told by your health care provider. Do not stop taking the antibiotic even if you start to feel better.  · Ask your health care provider if the medicine prescribed to you:  ? Requires you to avoid driving or using heavy machinery.  ? Can cause constipation. You may need to take actions to prevent or treat constipation, such as:  § Take over-the-counter or prescription medicines.  § Eat foods that are high in fiber, such as fresh fruits and vegetables, whole grains, and beans.  § Limit foods that are high in fat and processed sugars, such as fried or sweet foods.  · Do not drive for 24 hours if you were given a sedative during your procedure.  Activity    · Return to your normal activities as told by your health care provider. Ask your health care provider what activities are safe for you.  · Do not lift anything that is heavier than 10 lb (4.5 kg), or the limit that you are told, for 3 weeks after the procedure or until your health care provider says that it is safe.  · Avoid intense physical activity for as long as told by your health care provider.  · Avoid  sitting for a long time without moving. Get up and move around one or more times every few hours. This helps to prevent blood clots. You may increase your physical activity gradually as you start to feel better.  Lifestyle  · Do not drink alcohol for as long as told by your health care provider. This is especially important if you are taking prescription pain medicines.  · Do not engage in sexual activity until your health care provider says that you can do this.  General instructions    · Do not take baths, swim, or use a hot tub until your health care provider approves.  · Drink enough fluid to keep your urine pale yellow.  · Urinate as soon as you feel the need to. Do not try to hold your urine for long periods of time.  · If your health care provider approves, you may take a stool softener for 2-3 weeks to prevent you from straining to have a bowel movement.  · Wear compression stockings as told by your health care provider. These stockings help to prevent blood clots and reduce swelling in your legs.  · Keep all follow-up visits as told by your health care provider. This is important.  Contact a health care provider if you have:  · Difficulty urinating.  · A fever.  · Pain that gets worse or does not improve with medicine.  · Blood in your urine that does not go away after 1 week of resting and drinking more fluids.  · Swelling in your penis or testicles.  Get help right away if:  · You are unable to urinate.  · You are having more blood clots in your urine instead of fewer.  · You have:  ? Large blood clots.  ? A lot of blood in your urine.  ? Pain in your back or lower abdomen.  ? Pain or swelling in your legs.  ? Chills and you are shaking.  ? Difficulty breathing or shortness of breath.  Summary  · After the procedure, it is common to have a small amount of blood in your urine.  · Avoid heavy lifting and intense physical activity for as long as told by your health care provider.  · Urinate as soon as you  feel the need to. Do not try to hold your urine for long periods of time.  · Keep all follow-up visits as told by your health care provider. This is important.  This information is not intended to replace advice given to you by your health care provider. Make sure you discuss any questions you have with your health care provider.  Document Released: 12/18/2006 Document Revised: 04/08/2020 Document Reviewed: 09/18/2019  [employer] Patient Education © 2020 [employer]          Discharge Instructions    Discharged to home by car with relative. Discharged via wheelchair, hospital escort: Yes.  Special equipment needed: Not Applicable    Be sure to schedule a follow-up appointment with your primary care doctor or any specialists as instructed.     Discharge Plan:        I understand that a diet low in cholesterol, fat, and sodium is recommended for good health. Unless I have been given specific instructions below for another diet, I accept this instruction as my diet prescription.   Other diet: regular    Special Instructions: None    · Is patient discharged on Warfarin / Coumadin?   No     Depression / Suicide Risk    As you are discharged from this [facility] facility, it is important to learn how to keep safe from harming yourself.    Recognize the warning signs:  · Abrupt changes in personality, positive or negative- including increase in energy   · Giving away possessions  · Change in eating patterns- significant weight changes-  positive or negative  · Change in sleeping patterns- unable to sleep or sleeping all the time   · Unwillingness or inability to communicate  · Depression  · Unusual sadness, discouragement and loneliness  · Talk of wanting to die  · Neglect of personal appearance   · Rebelliousness- reckless behavior  · Withdrawal from people/activities they love  · Confusion- inability to concentrate     If you or a loved one observes any of these behaviors or has concerns about self-harm, here's what  you can do:  · Talk about it- your feelings and reasons for harming yourself  · Remove any means that you might use to hurt yourself (examples: pills, rope, extension cords, firearm)  · Get professional help from the community (Mental Health, Substance Abuse, psychological counseling)  · Do not be alone:Call your Safe Contact- someone whom you trust who will be there for you.  · Call your local CRISIS HOTLINE 079-1562 or 186-980-1207  · Call your local Children's Mobile Crisis Response Team [place] (966) 871-5426 or www.[employer]  · Call the toll free National Suicide Prevention Hotlines   · National Suicide Prevention Lifeline 941-792-ZASL (6189)  · National Hope Line Network 800-SUICIDE (200-3277)

## 2021-06-12 NOTE — PROGRESS NOTES
2RN skin check completed  Scalp eczema patches  B/L hands and forearm eczema patches  CBI medina taped to left leg  Redness behind ears  Right buttock hardened cyst  Coccyx redness-blanches

## 2021-06-14 LAB — PATHOLOGY CONSULT NOTE: NORMAL

## 2023-08-16 ENCOUNTER — HOSPITAL ENCOUNTER (OUTPATIENT)
Dept: RADIOLOGY | Facility: MEDICAL CENTER | Age: 73
End: 2023-08-16
Attending: PHYSICIAN ASSISTANT
Payer: MEDICARE

## 2023-08-16 DIAGNOSIS — R31.0 GROSS HEMATURIA: ICD-10-CM

## 2023-08-16 PROCEDURE — 700117 HCHG RX CONTRAST REV CODE 255: Performed by: PHYSICIAN ASSISTANT

## 2023-08-16 PROCEDURE — 74178 CT ABD&PLV WO CNTR FLWD CNTR: CPT

## 2023-08-16 RX ADMIN — IOHEXOL 100 ML: 350 INJECTION, SOLUTION INTRAVENOUS at 16:30

## 2025-03-16 ENCOUNTER — HOSPITAL ENCOUNTER (OUTPATIENT)
Dept: RADIOLOGY | Facility: MEDICAL CENTER | Age: 75
End: 2025-03-16
Payer: MEDICARE

## 2025-03-16 PROBLEM — K92.2 GI BLEED: Status: ACTIVE | Noted: 2025-03-16

## 2025-03-17 ENCOUNTER — HOSPITAL ENCOUNTER (INPATIENT)
Facility: MEDICAL CENTER | Age: 75
LOS: 3 days | DRG: 841 | End: 2025-03-20
Attending: STUDENT IN AN ORGANIZED HEALTH CARE EDUCATION/TRAINING PROGRAM | Admitting: STUDENT IN AN ORGANIZED HEALTH CARE EDUCATION/TRAINING PROGRAM
Payer: MEDICARE

## 2025-03-17 DIAGNOSIS — Z96.651 S/P TOTAL KNEE ARTHROPLASTY, RIGHT: ICD-10-CM

## 2025-03-17 DIAGNOSIS — N13.8 BENIGN PROSTATIC HYPERPLASIA WITH URINARY OBSTRUCTION: ICD-10-CM

## 2025-03-17 DIAGNOSIS — D62 ACUTE BLOOD LOSS ANEMIA: ICD-10-CM

## 2025-03-17 DIAGNOSIS — K25.4 GASTROINTESTINAL HEMORRHAGE ASSOCIATED WITH GASTRIC ULCER: ICD-10-CM

## 2025-03-17 DIAGNOSIS — E78.5 DYSLIPIDEMIA: ICD-10-CM

## 2025-03-17 DIAGNOSIS — C85.90 LYMPHOMA, UNSPECIFIED BODY REGION, UNSPECIFIED LYMPHOMA TYPE (HCC): ICD-10-CM

## 2025-03-17 DIAGNOSIS — N40.1 BENIGN PROSTATIC HYPERPLASIA WITH URINARY OBSTRUCTION: ICD-10-CM

## 2025-03-17 LAB
ABO + RH BLD: NORMAL
ABO GROUP BLD: NORMAL
ALBUMIN SERPL BCP-MCNC: 2.4 G/DL (ref 3.2–4.9)
ALBUMIN/GLOB SERPL: 1.2 G/DL
ALP SERPL-CCNC: 49 U/L (ref 30–99)
ALT SERPL-CCNC: 13 U/L (ref 2–50)
ANION GAP SERPL CALC-SCNC: 7 MMOL/L (ref 7–16)
AST SERPL-CCNC: 22 U/L (ref 12–45)
BARCODED ABORH UBTYP: 9500
BARCODED PRD CODE UBPRD: NORMAL
BARCODED UNIT NUM UBUNT: NORMAL
BASOPHILS # BLD AUTO: 0.3 % (ref 0–1.8)
BASOPHILS # BLD: 0.04 K/UL (ref 0–0.12)
BILIRUB SERPL-MCNC: 0.7 MG/DL (ref 0.1–1.5)
BLD GP AB SCN SERPL QL: NORMAL
BUN SERPL-MCNC: 29 MG/DL (ref 8–22)
CALCIUM ALBUM COR SERPL-MCNC: 9.9 MG/DL (ref 8.5–10.5)
CALCIUM SERPL-MCNC: 8.6 MG/DL (ref 8.5–10.5)
CHLORIDE SERPL-SCNC: 108 MMOL/L (ref 96–112)
CO2 SERPL-SCNC: 20 MMOL/L (ref 20–33)
COMPONENT R 8504R: NORMAL
CREAT SERPL-MCNC: 0.72 MG/DL (ref 0.5–1.4)
EOSINOPHIL # BLD AUTO: 0.73 K/UL (ref 0–0.51)
EOSINOPHIL NFR BLD: 5.7 % (ref 0–6.9)
ERYTHROCYTE [DISTWIDTH] IN BLOOD BY AUTOMATED COUNT: 50.6 FL (ref 35.9–50)
GFR SERPLBLD CREATININE-BSD FMLA CKD-EPI: 94 ML/MIN/1.73 M 2
GLOBULIN SER CALC-MCNC: 2 G/DL (ref 1.9–3.5)
GLUCOSE SERPL-MCNC: 85 MG/DL (ref 65–99)
HCT VFR BLD AUTO: 21.7 % (ref 42–52)
HCT VFR BLD AUTO: 22.9 % (ref 42–52)
HCT VFR BLD AUTO: 28.5 % (ref 42–52)
HGB BLD-MCNC: 7.2 G/DL (ref 14–18)
HGB BLD-MCNC: 7.2 G/DL (ref 14–18)
HGB BLD-MCNC: 9.1 G/DL (ref 14–18)
IMM GRANULOCYTES # BLD AUTO: 0.05 K/UL (ref 0–0.11)
IMM GRANULOCYTES NFR BLD AUTO: 0.4 % (ref 0–0.9)
LYMPHOCYTES # BLD AUTO: 1.19 K/UL (ref 1–4.8)
LYMPHOCYTES NFR BLD: 9.3 % (ref 22–41)
MCH RBC QN AUTO: 29.6 PG (ref 27–33)
MCHC RBC AUTO-ENTMCNC: 31.4 G/DL (ref 32.3–36.5)
MCV RBC AUTO: 94.2 FL (ref 81.4–97.8)
MONOCYTES # BLD AUTO: 0.69 K/UL (ref 0–0.85)
MONOCYTES NFR BLD AUTO: 5.4 % (ref 0–13.4)
NEUTROPHILS # BLD AUTO: 10.06 K/UL (ref 1.82–7.42)
NEUTROPHILS NFR BLD: 78.9 % (ref 44–72)
NRBC # BLD AUTO: 0 K/UL
NRBC BLD-RTO: 0 /100 WBC (ref 0–0.2)
PLATELET # BLD AUTO: 340 K/UL (ref 164–446)
PMV BLD AUTO: 8.9 FL (ref 9–12.9)
POTASSIUM SERPL-SCNC: 4.2 MMOL/L (ref 3.6–5.5)
PRODUCT TYPE UPROD: NORMAL
PROT SERPL-MCNC: 4.4 G/DL (ref 6–8.2)
RBC # BLD AUTO: 2.43 M/UL (ref 4.7–6.1)
RH BLD: NORMAL
SODIUM SERPL-SCNC: 135 MMOL/L (ref 135–145)
UNIT STATUS USTAT: NORMAL
WBC # BLD AUTO: 12.8 K/UL (ref 4.8–10.8)

## 2025-03-17 PROCEDURE — 36415 COLL VENOUS BLD VENIPUNCTURE: CPT

## 2025-03-17 PROCEDURE — 700102 HCHG RX REV CODE 250 W/ 637 OVERRIDE(OP)

## 2025-03-17 PROCEDURE — 86901 BLOOD TYPING SEROLOGIC RH(D): CPT | Mod: 91

## 2025-03-17 PROCEDURE — 99223 1ST HOSP IP/OBS HIGH 75: CPT | Mod: GC,AI | Performed by: STUDENT IN AN ORGANIZED HEALTH CARE EDUCATION/TRAINING PROGRAM

## 2025-03-17 PROCEDURE — 97162 PT EVAL MOD COMPLEX 30 MIN: CPT

## 2025-03-17 PROCEDURE — 86900 BLOOD TYPING SEROLOGIC ABO: CPT | Mod: 91

## 2025-03-17 PROCEDURE — P9016 RBC LEUKOCYTES REDUCED: HCPCS

## 2025-03-17 PROCEDURE — 86923 COMPATIBILITY TEST ELECTRIC: CPT

## 2025-03-17 PROCEDURE — 30233N1 TRANSFUSION OF NONAUTOLOGOUS RED BLOOD CELLS INTO PERIPHERAL VEIN, PERCUTANEOUS APPROACH: ICD-10-PCS | Performed by: INTERNAL MEDICINE

## 2025-03-17 PROCEDURE — 770000 HCHG ROOM/CARE - INTERMEDIATE ICU *

## 2025-03-17 PROCEDURE — 85018 HEMOGLOBIN: CPT | Mod: 91

## 2025-03-17 PROCEDURE — A9270 NON-COVERED ITEM OR SERVICE: HCPCS

## 2025-03-17 PROCEDURE — 80053 COMPREHEN METABOLIC PANEL: CPT

## 2025-03-17 PROCEDURE — 36430 TRANSFUSION BLD/BLD COMPNT: CPT

## 2025-03-17 PROCEDURE — 700111 HCHG RX REV CODE 636 W/ 250 OVERRIDE (IP)

## 2025-03-17 PROCEDURE — 86850 RBC ANTIBODY SCREEN: CPT

## 2025-03-17 PROCEDURE — 85014 HEMATOCRIT: CPT

## 2025-03-17 PROCEDURE — 97535 SELF CARE MNGMENT TRAINING: CPT

## 2025-03-17 PROCEDURE — 85025 COMPLETE CBC W/AUTO DIFF WBC: CPT

## 2025-03-17 RX ORDER — POLYETHYLENE GLYCOL 3350 17 G/17G
1 POWDER, FOR SOLUTION ORAL
Status: DISCONTINUED | OUTPATIENT
Start: 2025-03-17 | End: 2025-03-20 | Stop reason: HOSPADM

## 2025-03-17 RX ORDER — ACETAMINOPHEN 325 MG/1
650 TABLET ORAL EVERY 6 HOURS PRN
Status: DISCONTINUED | OUTPATIENT
Start: 2025-03-17 | End: 2025-03-20 | Stop reason: HOSPADM

## 2025-03-17 RX ORDER — AMOXICILLIN 250 MG
2 CAPSULE ORAL EVERY EVENING
Status: DISCONTINUED | OUTPATIENT
Start: 2025-03-17 | End: 2025-03-20 | Stop reason: HOSPADM

## 2025-03-17 RX ORDER — PANTOPRAZOLE SODIUM 40 MG/10ML
40 INJECTION, POWDER, LYOPHILIZED, FOR SOLUTION INTRAVENOUS DAILY
Status: DISCONTINUED | OUTPATIENT
Start: 2025-03-17 | End: 2025-03-18

## 2025-03-17 RX ORDER — OMEPRAZOLE 20 MG/1
20 CAPSULE, DELAYED RELEASE ORAL 2 TIMES DAILY
Status: DISCONTINUED | OUTPATIENT
Start: 2025-03-17 | End: 2025-03-17

## 2025-03-17 RX ORDER — ATORVASTATIN CALCIUM 10 MG/1
10 TABLET, FILM COATED ORAL DAILY
Status: DISCONTINUED | OUTPATIENT
Start: 2025-03-17 | End: 2025-03-20 | Stop reason: HOSPADM

## 2025-03-17 RX ORDER — TAMSULOSIN HYDROCHLORIDE 0.4 MG/1
0.8 CAPSULE ORAL DAILY
Status: DISCONTINUED | OUTPATIENT
Start: 2025-03-17 | End: 2025-03-20 | Stop reason: HOSPADM

## 2025-03-17 RX ADMIN — ACETAMINOPHEN 650 MG: 325 TABLET, FILM COATED ORAL at 08:15

## 2025-03-17 RX ADMIN — TAMSULOSIN HYDROCHLORIDE 0.8 MG: 0.4 CAPSULE ORAL at 05:33

## 2025-03-17 RX ADMIN — ATORVASTATIN CALCIUM 10 MG: 10 TABLET, FILM COATED ORAL at 05:33

## 2025-03-17 RX ADMIN — SENNOSIDES AND DOCUSATE SODIUM 2 TABLET: 50; 8.6 TABLET ORAL at 17:45

## 2025-03-17 RX ADMIN — PANTOPRAZOLE SODIUM 40 MG: 40 INJECTION, POWDER, FOR SOLUTION INTRAVENOUS at 05:33

## 2025-03-17 RX ADMIN — ACETAMINOPHEN 650 MG: 325 TABLET, FILM COATED ORAL at 17:45

## 2025-03-17 SDOH — ECONOMIC STABILITY: TRANSPORTATION INSECURITY
IN THE PAST 12 MONTHS, HAS THE LACK OF TRANSPORTATION KEPT YOU FROM MEDICAL APPOINTMENTS OR FROM GETTING MEDICATIONS?: NO

## 2025-03-17 SDOH — ECONOMIC STABILITY: TRANSPORTATION INSECURITY
IN THE PAST 12 MONTHS, HAS LACK OF RELIABLE TRANSPORTATION KEPT YOU FROM MEDICAL APPOINTMENTS, MEETINGS, WORK OR FROM GETTING THINGS NEEDED FOR DAILY LIVING?: NO

## 2025-03-17 ASSESSMENT — LIFESTYLE VARIABLES
TOTAL SCORE: 0
HOW MANY TIMES IN THE PAST YEAR HAVE YOU HAD 5 OR MORE DRINKS IN A DAY: 0
EVER FELT BAD OR GUILTY ABOUT YOUR DRINKING: NO
CONSUMPTION TOTAL: NEGATIVE
HAVE PEOPLE ANNOYED YOU BY CRITICIZING YOUR DRINKING: NO
TOTAL SCORE: 0
ALCOHOL_USE: NO
EVER HAD A DRINK FIRST THING IN THE MORNING TO STEADY YOUR NERVES TO GET RID OF A HANGOVER: NO
AVERAGE NUMBER OF DAYS PER WEEK YOU HAVE A DRINK CONTAINING ALCOHOL: 0
ON A TYPICAL DAY WHEN YOU DRINK ALCOHOL HOW MANY DRINKS DO YOU HAVE: 0
TOTAL SCORE: 0
DOES PATIENT WANT TO STOP DRINKING: NO
HAVE YOU EVER FELT YOU SHOULD CUT DOWN ON YOUR DRINKING: NO

## 2025-03-17 ASSESSMENT — GAIT ASSESSMENTS
DISTANCE (FEET): 75
ASSISTIVE DEVICE: FRONT WHEEL WALKER
GAIT LEVEL OF ASSIST: CONTACT GUARD ASSIST
DEVIATION: BRADYKINETIC;STEP TO

## 2025-03-17 ASSESSMENT — COGNITIVE AND FUNCTIONAL STATUS - GENERAL
MOVING TO AND FROM BED TO CHAIR: A LITTLE
MOBILITY SCORE: 17
WALKING IN HOSPITAL ROOM: A LOT
TOILETING: A LITTLE
CLIMB 3 TO 5 STEPS WITH RAILING: A LITTLE
MOVING FROM LYING ON BACK TO SITTING ON SIDE OF FLAT BED: A LITTLE
DRESSING REGULAR LOWER BODY CLOTHING: A LITTLE
SUGGESTED CMS G CODE MODIFIER MOBILITY: CK
WALKING IN HOSPITAL ROOM: A LITTLE
HELP NEEDED FOR BATHING: A LITTLE
STANDING UP FROM CHAIR USING ARMS: A LITTLE
MOBILITY SCORE: 20
DAILY ACTIVITIY SCORE: 21
MOVING TO AND FROM BED TO CHAIR: A LITTLE
SUGGESTED CMS G CODE MODIFIER MOBILITY: CJ
SUGGESTED CMS G CODE MODIFIER DAILY ACTIVITY: CJ
STANDING UP FROM CHAIR USING ARMS: A LITTLE
CLIMB 3 TO 5 STEPS WITH RAILING: A LOT

## 2025-03-17 ASSESSMENT — PAIN DESCRIPTION - PAIN TYPE
TYPE: ACUTE PAIN

## 2025-03-17 ASSESSMENT — SOCIAL DETERMINANTS OF HEALTH (SDOH)
WITHIN THE LAST YEAR, HAVE TO BEEN RAPED OR FORCED TO HAVE ANY KIND OF SEXUAL ACTIVITY BY YOUR PARTNER OR EX-PARTNER?: NO
IN THE PAST 12 MONTHS, HAS THE ELECTRIC, GAS, OIL, OR WATER COMPANY THREATENED TO SHUT OFF SERVICE IN YOUR HOME?: NO
WITHIN THE LAST YEAR, HAVE YOU BEEN AFRAID OF YOUR PARTNER OR EX-PARTNER?: NO
WITHIN THE LAST YEAR, HAVE YOU BEEN HUMILIATED OR EMOTIONALLY ABUSED IN OTHER WAYS BY YOUR PARTNER OR EX-PARTNER?: NO
WITHIN THE LAST YEAR, HAVE YOU BEEN KICKED, HIT, SLAPPED, OR OTHERWISE PHYSICALLY HURT BY YOUR PARTNER OR EX-PARTNER?: NO
WITHIN THE PAST 12 MONTHS, THE FOOD YOU BOUGHT JUST DIDN'T LAST AND YOU DIDN'T HAVE MONEY TO GET MORE: NEVER TRUE
WITHIN THE PAST 12 MONTHS, YOU WORRIED THAT YOUR FOOD WOULD RUN OUT BEFORE YOU GOT THE MONEY TO BUY MORE: NEVER TRUE

## 2025-03-17 ASSESSMENT — PATIENT HEALTH QUESTIONNAIRE - PHQ9
2. FEELING DOWN, DEPRESSED, IRRITABLE, OR HOPELESS: NOT AT ALL
1. LITTLE INTEREST OR PLEASURE IN DOING THINGS: NOT AT ALL
SUM OF ALL RESPONSES TO PHQ9 QUESTIONS 1 AND 2: 0

## 2025-03-17 NOTE — CARE PLAN
The patient is Stable - Low risk of patient condition declining or worsening    Shift Goals  Clinical Goals: Monitor CBC levels, increase mobility  Patient Goals: Stop bleeding and less pain  Family Goals: CRISTINA    Progress made toward(s) clinical / shift goals:    Problem: Knowledge Deficit - Standard  Goal: Patient and family/care givers will demonstrate understanding of plan of care, disease process/condition, diagnostic tests and medications  Description: Target End Date:  1-3 days or as soon as patient condition allowsDocument in Patient Education1.  Patient and family/caregiver oriented to unit, equipment, visitation policy and means for communicating concern2.  Complete/review Learning Assessment3.  Assess knowledge level of disease process/condition, treatment plan, diagnostic tests and medications4.  Explain disease process/condition, treatment plan, diagnostic tests and medications  Outcome: Progressing     Problem: Pain - Standard  Goal: Alleviation of pain or a reduction in pain to the patient’s comfort goal  Description: Target End Date:  Prior to discharge or change in level of careDocument on Vitals flowsheet1.  Document pain using the appropriate pain scale per order or unit policy2.  Educate and implement non-pharmacologic comfort measures (i.e. relaxation, distraction, massage, cold/heat therapy, etc.)3.  Pain management medications as ordered4.  Reassess pain after pain med administration per policy5.  If opiods administered assess patient's response to pain medication is appropriate per POSS sedation scale6.  Follow pain management plan developed in collaboration with patient and interdisciplinary team (including palliative care or pain specialists if applicable)  Outcome: Progressing     Problem: Skin Integrity  Goal: Skin integrity is maintained or improved  Description: Target End Date:  Prior to discharge or change in level of careDocument interventions on Skin Risk/Kashif flowsheet groups and  corresponding LDA1.  Assess and monitor skin integrity, appearance and/or temperature2.  Assess risk factors for impaired skin integrity and/or pressures ulcers3.  Implement precautions to protect skin integrity in collaboration with interdisciplinary team4.  Implement pressure ulcer prevention protocol if at risk for skin breakdown5.  Confirm wound care consult if at risk for skin breakdown6.  Ensure patient use of pressure relieving devices  (Low air loss bed, waffle overlay, heel protectors, ROHO cushion, etc)  Outcome: Progressing     Problem: Fall Risk  Goal: Patient will remain free from falls  Description: Target End Date:  Prior to discharge or change in level of careDocument interventions on the [facility] Fall Risk Assessment1.  Assess for fall risk factors2.  Implement fall precautions  Outcome: Progressing     Problem: Psychosocial  Goal: Patient's level of anxiety will decrease  Description: Target End Date:  1-3 days or as soon as patient condition allows1.  Collaborate with patient and family/caregiver to identify triggers and develop strategies to cope with anxiety2.  Implement stimuli reduction, calming techniques3.  Pharmacologic management per provider order4.  Encourage patient/family/care giver participation5.  Collaborate with interdisciplinary team including Psychologist or Behavioral Health Team as needed  Outcome: Progressing  Goal: Patient's ability to verbalize feelings about condition will improve  Description: Target End Date:  Prior to discharge or change in level of care1.  Discuss coping with medical condition and its effects2.  Encourage patient participation in care3.  Encourage acknowledgement of body changes and accompanying emotions4.  Perform depression screening  Outcome: Progressing     Problem: Hemodynamics  Goal: Patient's hemodynamics, fluid balance and neurologic status will be stable or improve  Description: Target End Date:  Prior to discharge or change in level of  careDocument on Assessment and I/O flowsheet templates1.  Monitor vital signs, pulse oximetry and cardiac monitor per provider order and/or policy2.  Maintain blood pressure per provider order3.  Hemodynamic monitoring per provider order4.  Manage IV fluids and IV infusions5.  Monitor intake and output6.  Daily weights per unit policy or provider order7.  Assess peripheral pulses and capillary refill8.  Assess color and body temperature9.  Position patient for maximum circulation/cardiac [id number]. Monitor for signs/symptoms of excessive [id number]. Assess mental status, restlessness and changes in level of [id number]. Monitor temperature and report fever or hypothermia to provider immediately. Consideration of targeted temperature management.  Outcome: Progressing     Problem: Respiratory  Goal: Patient will achieve/maintain optimum respiratory ventilation and gas exchange  Description: Target End Date:  Prior to discharge or change in level of careDocument on Assessment flowsheet1.  Assess and monitor rate, rhythm, depth and effort of respiration2.  Breath sounds assessed qshift and/or as needed3.  Assess O2 saturation, administer/titrate oxygen as ordered4.  Position patient for maximum ventilatory efficiency5.  Turn, cough, and deep breath with splinting to improve effectiveness6.  Collaborate with RT to administer medication/treatments per order7.  Encourage use of incentive spirometer and encourage patient to cough after use and utilize splinting techniques if applicable8.  Airway suctioning9.  Monitor sputum production for changes in color, consistency and [id number]. Perform frequent oral [id number]. Alternate physical activity with rest periods  Outcome: Progressing     Problem: Venous Thromboembolism (VTE) Prevention  Goal: The patient will remain free from venous thromboembolism (VTE)  Description: Target End Date:  Prior to discharge or change in level of care1.  VTE prophylaxis assessed and  initiated by day 1 or 2 of hospital admission (pharmacologic or mechanical). Contraindication documented by provider.2.  Ensure patient wears mechanical prophylaxis when in bed or chair if ordered3.  Remove mechanical prophylaxis at least once per shift for skin checks4.  Encourage patient to perform ankle flex, foot rotation and knee exercises in addition to other prophylactic measures every hour while awake5.  Encourage ambulation/mobilization at level directed by Physical Therapy in collaboration with interdisciplinary team6.  Administer prophylactic medication per order  Outcome: Progressing     Problem: Nutrition  Goal: Patient's nutritional and fluid intake will be adequate or improve  Description: Target End Date:  Prior to discharge or change in level of careDocument on I/O flowsheet1.  Monitor nutritional intake2.  Monitor weight per provider order3.  Assess patient's ability to take oral nutrition4.  Collaborate with Speech Therapy, Dietitian and interdisciplinary team for appropriate feeding and fluid intake5.  Assist with feeding  Outcome: Progressing     Problem: Urinary Elimination  Goal: Establish and maintain regular urinary output  Description: Target End Date:  Prior to discharge or change in level of careDocument on I/O and Assessment flowsheets1.  Evaluate need to continue indwelling catheter every shift2.  Assess signs and symptoms of urinary retention3.  Assess post-void residual volumes4.  Implement bladder training program5.  Encourage scheduled voidings6.  Assist patient to sit on bedside commode or toilet for voiding7.  Educate patient and family/caregiver on use and purpose of urine collection devices (document in Patient Education)  Outcome: Progressing     Problem: Bowel Elimination  Goal: Establish and maintain regular bowel function  Description: Target End Date:  Prior to discharge or change in level of care1.   Note date of last BM2.   Educate about diet, fluid intake, medication and  activity to promote bowel function3.   Educate signs and symptoms of constipation and interventions to implement4.   Pharmacologic bowel management per provider order5.   Regular toileting schedule6.   Upright position for toileting7.   High fiber diet8.   Encourage hydration9.   Collaborate with Clinical Fisetxhav50. Care and maintenance of ostomy if applicable  Outcome: Progressing       Patient is not progressing towards the following goals:

## 2025-03-17 NOTE — ASSESSMENT & PLAN NOTE
RIGHT TOTAL KNEE ARTHROPLASTY,  (Right) on 3/4/25  KNEE ARTHROPLASTY TOTAL REPLACEMENT COMPUTER/ROBOTIC ASSISTED  He was given ASA 81mg for VTE ppx and ibuprofen for pain med.  -PT/OT eval recommending home health versus outpatient PT OT

## 2025-03-17 NOTE — PROGRESS NOTES
[facility] DIRECT ADMIT ACCEPTANCE NOTE    Transferring facility: [facility]  Transferring provider: Dr. Pennington    Chief complaint: GI bleed  Pertinent history & patient course:   75 year old male with recent R knee replacement almost 2 weeks prior who was on aspirin 81mg for DVT ppx and taking NSAIDs for pain control and presented with weakness and anemia. Noted to have hgb 5 on 3/15 and was transfused 2u pRBCs with improvement in H/H. However, overnight his H/H downtrended again and hgb was 5 on 3/16 AM - he was transfused 2u pRBC and repeat H/H was 7.9 around 1600. He underwent EGD with surgery today which showed severe complex ulcers and he underwent clipping and cautery. Surgery was apparently quite concerned for risk of re-bleed based on the appearance of these ulcers and stated that he might need IR or surgical intervention if he decompensates so they want to transfer him to higher level of care. He is hemodynamically stable currently and awake, alert. INR 1.2, platelets normal. His troponin is also slowly uptrending at 0.79 recently - initially had some ST depressions on EKG that have resolved.   Pertinent imaging & lab results: as noted  Consultants called prior to transfer and pertinent input from consultants: none  Code Status: FULL CODE per transferring provider, I personally verified with the transferring provider patient's code status and the transferring provider has confirmed this with the patient.  Reason for Transfer: GI bleed  Further work up or recommendations requested prior to transfer: none    Patient accepted for transfer: Yes  Accepting [facility] Facility: [facility] - Nursing to notify the Triage Coordinator in the [facility] via Voalte or Phone ext. [phone] when patient arrives to the unit. The Triage Coordinator will assign the admitting provider.    Consultants to be called upon arrival: GI  Admission status: Inpatient.   Floor requested: IMCU - I called Dr. Fernandez  and discussed case with her.     The admitting provider is the point of contact for questions or concerns regarding the patient's care.        Kylie Sauceda MD  Pulmonary and Critical Care Medicine  [facility]

## 2025-03-17 NOTE — PROGRESS NOTES
4 Eyes Skin Assessment Completed by MARELY Sevilla and MARELY Martinez.    Head Redness-eczema   Ears Redness and Blanching, dry  Nose WDL  Mouth WDL  Neck WDL  Breast/Chest WDL  Shoulder Blades WDL  Spine WDL  (R) Arm/Elbow/Hand Redness and Blanching  (L) Arm/Elbow/Hand Redness and Blanching  Abdomen WDL  Groin WDL  Scrotum/Coccyx/Buttocks Large Bruise, slow to freida redness  (R) Leg Incision- right knee, bruise to right hip  (L) Leg WDL  (R) Heel/Foot/Toe dry  (L) Heel/Foot/Toe dry          Devices In Places ECG, Blood Pressure Cuff, and Pulse Ox      Interventions In Place Low Air Loss Mattress    Possible Skin Injury Yes    Pictures Uploaded Into Epic Yes  Wound Consult Placed Yes  RN Wound Prevention Protocol Ordered Yes

## 2025-03-17 NOTE — HOSPITAL COURSE
Arya Gamez is a 75 y.o. 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 The patient, who recently had a right knee replacement almost two weeks ago, was on aspirin 81mg for DVT prophylaxis and taking NSAIDs for pain control. He presented with weakness and anemia, with a hemoglobin of 5 on 3/15. He received 2 units of packed red blood cells (pRBCs), which improved his hemoglobin and hematocrit. However, by the morning of 3/16, his hemoglobin had dropped back to 5, so he was transfused with another 2 units of pRBCs. By 4 PM, his hemoglobin had risen to 7.9. An EGD was performed today, revealing severe complex ulcers, which were treated with clipping and cautery. The surgical team expressed concern about the risk of rebleeding due to the appearance of the ulcers and mentioned that he might require interventional radiology or surgical intervention if he worsens. They recommended transferring him to a higher level of care. The patient is currently hemodynamically stable, awake, and alert. His INR is 1.2, platelets are normal, and his troponin has been gradually increasing, now at 0.79. He initially had ST depressions on his EKG, which have since resolved.    He denied headache, dizziness, chest pain, fever, cough, hemoptysis, vomiting of blood. He said he had dark tarry color stool one time. He felt so weak at home after knee replacement, felt like needing oxygen and ambulance bring him to hospital and give him oxygen.     Admitted to MICU for tele and higher level care.

## 2025-03-17 NOTE — ASSESSMENT & PLAN NOTE
He was given ASA 81mg for VTE ppx for 28 days and ibuprofen for pain med after right knee replacement.  He presented with weakness and anemia, with a hemoglobin of 5 on 3/15. He received 2 units of packed red blood cells (pRBCs), which improved his hemoglobin and hematocrit. However, by the morning of 3/16, his hemoglobin had dropped back to 5, so he was transfused with another 2 units of pRBCs. By 4 PM on 3/16, his hemoglobin had risen to 7.9. An EGD was performed today, revealing severe complex ulcers, which were treated with clipping and cautery.   -CTM H&H, transfuse if Hb <7  -IV pantoprazole 40 mg: change to po omeprazole BID  Repeat EGD 3/18: complex ulcer again seen.  Bx taken.  No signs of active bleeding    No signs of bleeding, hemoglobin dropped to 7.6 from 8.5 blood pressure stable.  Continue monitoring H&H   Valvular heart disease

## 2025-03-17 NOTE — DISCHARGE PLANNING
Case Management Discharge Planning    Admission Date: 3/17/2025  GMLOS: 2.9  ALOS: 0    TRANSFER BACK PATIENT    Referring Facility: [facility]   Reason for Transfer: GI     Signed Repatriation/Transfer Back Agreement saved in .    Once this patient has received the requested service or the patient no longer requires tertiary level of care from [facility] and is stable to transfer back to referring facility, we can facilitate a transfer back. Please contact the [facility] at [phone] to coordinate this transfer request.

## 2025-03-17 NOTE — PROGRESS NOTES
[facility] Medicine Daily Progress Note    Date of Service  3/17/2025    Chief Complaint  Arya Gamez is a 75 y.o. male admitted 3/17/2025 with GI bleed    Hospital Course  Arya Gamze is a 75 y.o. male with PMH of has a past medical history of BPH (benign prostatic hyperplasia), Chronic pain, Fracture, Head injury, Hyperlipidemia, Urinary retention, Urinary tract infection (01/20/2025), cystoscopy w/ transurethral resection of posterior uretheral valves (2021); and prostatectomy (06/11/2021)  who presented 3/17/2025 transferred from [facility]. The patient, who recently had a right knee replacement almost two weeks ago, was on aspirin 81mg for DVT prophylaxis and taking NSAIDs for pain control. He presented with weakness and anemia, with a hemoglobin of 5 on 3/15. He received 2 units of packed red blood cells (pRBCs), which improved his hemoglobin and hematocrit. However, by the morning of 3/16, his hemoglobin had dropped back to 5, so he was transfused with another 2 units of pRBCs. By 4 PM, his hemoglobin had risen to 7.9. An EGD was performed today, revealing severe complex ulcers, which were treated with clipping and cautery. The surgical team expressed concern about the risk of rebleeding due to the appearance of the ulcers and mentioned that he might require interventional radiology or surgical intervention if he worsens. They recommended transferring him to a higher level of care. The patient is currently hemodynamically stable, awake, and alert. His INR is 1.2, platelets are normal, and his troponin has been gradually increasing, now at 0.79. He initially had ST depressions on his EKG, which have since resolved.    He denied headache, dizziness, chest pain, fever, cough, hemoptysis, vomiting of blood. He said he had dark tarry color stool one time. He felt so weak at home after knee replacement, felt like needing oxygen and ambulance bring him to hospital and give him  oxygen.     Admitted to MICU for tele and higher level care.    Interval Problem Update    03/17/25    Patient admitted by my colleague this morning.  I requested consultation with gastroenterologist and discussed with Dr. Coombs.  Plan is to continue clear liquid diet with no reds for today and n.p.o. from midnight and endoscopy tomorrow.  I will obtain consent for blood transfusion and ordered 1 unit of blood transfusion as recommended by GI.  For full details please see H&P note.    I have discussed this patient's plan of care and discharge plan at IDT rounds today with Case Management, Nursing, Nursing leadership, and other members of the IDT team.    Consultants/Specialty  GI    Code Status  Full Code    Disposition  The patient is not medically cleared for discharge to home or a post-acute facility.      I have placed the appropriate orders for post-discharge needs.    Review of Systems  ROS     Physical Exam  Temp:  [36.1 °C (97 °F)-36.6 °C (97.8 °F)] 36.1 °C (97 °F)  Pulse:  [] 97  Resp:  [12-21] 21  BP: ()/(57-68) 99/58  SpO2:  [92 %-97 %] 97 %    Physical Exam  Constitutional:       Appearance: He is ill-appearing.         Fluids    Intake/Output Summary (Last 24 hours) at 3/17/2025 1528  Last data filed at 3/17/2025 1400  Gross per 24 hour   Intake 0 ml   Output 700 ml   Net -700 ml        Laboratory  Recent Labs     03/17/25  0239 03/17/25  0928   WBC 12.8*  --    RBC 2.43*  --    HEMOGLOBIN 7.2* 7.2*   HEMATOCRIT 22.9* 21.7*   MCV 94.2  --    MCH 29.6  --    MCHC 31.4*  --    RDW 50.6*  --    PLATELETCT 340  --    MPV 8.9*  --      Recent Labs     03/17/25  0239   SODIUM 135   POTASSIUM 4.2   CHLORIDE 108   CO2 20   GLUCOSE 85   BUN 29*   CREATININE 0.72   CALCIUM 8.6                   Imaging  No orders to display        Assessment/Plan  * GI bleed- (present on admission)  Assessment & Plan  He was given ASA 81mg for VTE ppx for 28 days and ibuprofen for pain med after right knee  replacement.  He presented with weakness and anemia, with a hemoglobin of 5 on 3/15. He received 2 units of packed red blood cells (pRBCs), which improved his hemoglobin and hematocrit. However, by the morning of 3/16, his hemoglobin had dropped back to 5, so he was transfused with another 2 units of pRBCs. By 4 PM on 3/16, his hemoglobin had risen to 7.9. An EGD was performed today, revealing severe complex ulcers, which were treated with clipping and cautery.   -CTM H&H, transfuse if Hb <7  -IV pantoprazole 40 mg    Acute blood loss anemia  Assessment & Plan  See GI bleed    Dyslipidemia  Assessment & Plan  C/w atorvastatin    Benign prostatic hyperplasia with urinary obstruction- (present on admission)  Assessment & Plan  -c/w Tamsulosin    S/P total knee arthroplasty, right- (present on admission)  Assessment & Plan  RIGHT TOTAL KNEE ARTHROPLASTY,  (Right) on 3/4/25  KNEE ARTHROPLASTY TOTAL REPLACEMENT COMPUTER/ROBOTIC ASSISTED  He was given ASA 81mg for VTE ppx and ibuprofen for pain med.  -PT/OT in am           VTE prophylaxis:   SCDs/TEDs      I have performed a physical exam and reviewed and updated ROS and Plan today (3/17/2025). In review of yesterday's note (3/16/2025), there are no changes except as documented above.

## 2025-03-17 NOTE — H&P
[facility] History & Physical Note    Date of Service  3/17/2025    [facility] Team: [facility]   Attending: Angelina Coffey M.d.  Senior Resident: Dr. Radford  Intern:  Dr. White  Contact Number: [phone]    Primary Care Physician  SEFERINO Johnson    Consultants  critical care    Specialist Names: Sohail    Code Status  Full Code    Chief Complaint  No chief complaint on file.      History of Presenting Illness (HPI): Arya Gamez is a 75 y.o. male with PMH of has a past medical history of BPH (benign prostatic hyperplasia), Chronic pain, Fracture, Head injury, Hyperlipidemia, Urinary retention, Urinary tract infection (01/20/2025), cystoscopy w/ transurethral resection of posterior uretheral valves (2021); and prostatectomy (06/11/2021)  who presented 3/17/2025 transferred from [facility]. The patient, who recently had a right knee replacement almost two weeks ago, was on aspirin 81mg for DVT prophylaxis and taking NSAIDs for pain control. He presented with weakness and anemia, with a hemoglobin of 5 on 3/15. He received 2 units of packed red blood cells (pRBCs), which improved his hemoglobin and hematocrit. However, by the morning of 3/16, his hemoglobin had dropped back to 5, so he was transfused with another 2 units of pRBCs. By 4 PM, his hemoglobin had risen to 7.9. An EGD was performed today, revealing severe complex ulcers, which were treated with clipping and cautery. The surgical team expressed concern about the risk of rebleeding due to the appearance of the ulcers and mentioned that he might require interventional radiology or surgical intervention if he worsens. They recommended transferring him to a higher level of care. The patient is currently hemodynamically stable, awake, and alert. His INR is 1.2, platelets are normal, and his troponin has been gradually increasing, now at 0.79. He initially had ST depressions on his EKG, which have since resolved.    He  denied headache, dizziness, chest pain, fever, cough, hemoptysis, vomiting of blood. He said he had dark tarry color stool one time. He felt so weak at home after knee replacement, felt like needing oxygen and ambulance bring him to hospital and give him oxygen.     Admitted to MICU for tele and higher level care.    I discussed the plan of care with patient.    Review of Systems  Review of Systems   All other systems reviewed and are negative.      Past Medical History   has a past medical history of Anesthesia, Arthritis, Delayed emergence from general anesthesia, Dental disorder, Disorder of thyroid, High cholesterol, Pain, and Urinary incontinence.    Surgical History   has a past surgical history that includes hernia repair (2003); meniscus repair (Left, 1978); and turp button (N/A, 6/11/2021).     Family History  family history is not on file.   Family history reviewed with patient.       Allergies  Allergies   Allergen Reactions    Penicillins      Childhood reaction       Medications  Prior to Admission Medications   Prescriptions Last Dose Informant Patient Reported? Taking?   Glucosamine HCl (GLUCOSAMINE PO) 3/14/2025 Patient Yes Yes   Sig: Take 1 Dose by mouth every day.   acetaminophen (TYLENOL) 500 MG Tab 3/14/2025 Patient Yes No   Sig: Take 500-1,000 mg by mouth every 6 hours as needed.   atorvastatin (LIPITOR) 10 MG Tab 3/14/2025 Patient Yes No      Facility-Administered Medications: None       Physical Exam  Temp:  [36.6 °C (97.8 °F)] 36.6 °C (97.8 °F)  Pulse:  [96] 96  Resp:  [19] 19  BP: (138)/(66) 138/66  SpO2:  [96 %] 96 %  Blood Pressure : 138/66   Temperature: 36.6 °C (97.8 °F)   Pulse: 96   Respiration: 19   Pulse Oximetry: 96 %       Physical Exam  Vitals reviewed.   Constitutional:       Appearance: Normal appearance.   HENT:      Right Ear: Tympanic membrane normal.      Left Ear: Tympanic membrane normal.      Nose: Nose normal.      Mouth/Throat:      Mouth: Mucous membranes are moist.  "  Eyes:      Pupils: Pupils are equal, round, and reactive to light.   Cardiovascular:      Rate and Rhythm: Normal rate and regular rhythm.      Heart sounds: Normal heart sounds.   Pulmonary:      Effort: Pulmonary effort is normal.      Breath sounds: Normal breath sounds.   Abdominal:      General: Abdomen is flat. Bowel sounds are normal.   Musculoskeletal:         General: Normal range of motion.      Cervical back: Normal range of motion and neck supple.   Skin:     General: Skin is warm.   Neurological:      General: No focal deficit present.      Mental Status: He is alert and oriented to person, place, and time. Mental status is at baseline.   Psychiatric:         Mood and Affect: Mood normal.         Laboratory:  Recent Labs     03/17/25  0239   WBC 12.8*   RBC 2.43*   HEMOGLOBIN 7.2*   HEMATOCRIT 22.9*   MCV 94.2   MCH 29.6   MCHC 31.4*   RDW 50.6*   PLATELETCT 340   MPV 8.9*         No results for input(s): \"ALTSGPT\", \"ASTSGOT\", \"ALKPHOSPHAT\", \"TBILIRUBIN\", \"DBILIRUBIN\", \"GAMMAGT\", \"AMYLASE\", \"LIPASE\", \"ALB\", \"PREALBUMIN\", \"GLUCOSE\" in the last 72 hours.      No results for input(s): \"NTPROBNP\" in the last 72 hours.      No results for input(s): \"TROPONINT\" in the last 72 hours.    Imaging:  No orders to display       Assessment/Plan:  Problem Representation: I anticipate this patient will require at least two midnights for appropriate medical management, necessitating inpatient admission because Upper GI bleed    Patient will need a Intermediate Care (Adult and Pediatrics) bed on MEDICAL service .  The need is secondary to Upper GI bleed.    * GI bleed- (present on admission)  Assessment & Plan  He was given ASA 81mg for VTE ppx for 28 days and ibuprofen for pain med after right knee replacement.  He presented with weakness and anemia, with a hemoglobin of 5 on 3/15. He received 2 units of packed red blood cells (pRBCs), which improved his hemoglobin and hematocrit. However, by the morning of 3/16, his " hemoglobin had dropped back to 5, so he was transfused with another 2 units of pRBCs. By 4 PM on 3/16, his hemoglobin had risen to 7.9. An EGD was performed today, revealing severe complex ulcers, which were treated with clipping and cautery.   -CTM H&H, transfuse if Hb <7  -IV pantoprazole 40 mg    Acute blood loss anemia  Assessment & Plan  See GI bleed    Dyslipidemia  Assessment & Plan  C/w atorvastatin    Benign prostatic hyperplasia with urinary obstruction- (present on admission)  Assessment & Plan  -c/w Tamsulosin    S/P total knee arthroplasty, right- (present on admission)  Assessment & Plan  RIGHT TOTAL KNEE ARTHROPLASTY,  (Right) on 3/4/25  KNEE ARTHROPLASTY TOTAL REPLACEMENT COMPUTER/ROBOTIC ASSISTED  He was given ASA 81mg for VTE ppx and ibuprofen for pain med.  -PT/OT in am        VTE prophylaxis: pharmacologic prophylaxis contraindicated due to GI bleed

## 2025-03-17 NOTE — CARE PLAN
The patient is Watcher - Medium risk of patient condition declining or worsening    Shift Goals  Clinical Goals: Hgb>7, Hemodynamic stability, IR consult  Patient Goals: to get better  Family Goals: No family present    Progress made toward(s) clinical / shift goals:      Problem: Knowledge Deficit - Standard  Goal: Patient and family/care givers will demonstrate understanding of plan of care, disease process/condition, diagnostic tests and medications  Outcome: Progressing     Problem: Pain - Standard  Goal: Alleviation of pain or a reduction in pain to the patient’s comfort goal  Outcome: Progressing     Problem: Fall Risk  Goal: Patient will remain free from falls  Outcome: Progressing     Problem: Hemodynamics  Goal: Patient's hemodynamics, fluid balance and neurologic status will be stable or improve  Outcome: Progressing

## 2025-03-17 NOTE — CONSULTS
GASTROENTEROLOGY CONSULTATION    PATIENT NAME: Arya Gamez  : 1950  CSN: [id number]  MRN:  [id number]     CONSULTATION DATE:  3/17/2025    PRIMARY CARE PROVIDER:  SEFERINO Johnson      REASON FOR CONSULT:  GI bleed    Consult requested by Dr. Suha Gunter    HISTORY OF PRESENT ILLNESS:  Arya Gamez is a 75 y.o. male who presented 3/17/2025 transferred from [facility]. The patient, who two weeks had a right knee replacement, was on aspirin 81mg for DVT prophylaxis and taking NSAIDs for pain control. He presented with weakness and anemia, with a hemoglobin of 5 on 3/15. He received 2 units of packed red blood cells  which improved his hemoglobin and hematocrit. However, by the morning of 3/16, his hemoglobin had dropped back to 5, so he was transfused with another 2 units of pRBCs. By 4 PM, his hemoglobin had risen to 7.9. An EGD was performed that showed gastric ulcers in the antrum and around the incisure. There was an adherent clot in on ulcer bed in the stomach this was treated with hemoclips and bipolar probe. r The surgical team expressed concern about the risk of rebleeding due to the appearance of the ulcers and mentioned that he might require interventional radiology or surgical intervention if he worsens. They recommended transferring him to a higher level of care. The patient is currently hemodynamically stable, awake, and alert. His INR is 1.2, platelets are normal, and his troponin has been gradually increasing, now at 0.79. He initially had ST depressions on his EKG, which have since resolved.     His hemoglobin is 7.2. He has not had any hematemesis. He had on e pebble of stool that he says was black since he has been here. He does not have pain.   A lot of his history is per chart as he does have some confusion.   PAST MEDICAL HISTORY:  Past Medical History:   Diagnosis Date    Anesthesia     Arthritis     knees, wrists    Delayed emergence  from general anesthesia     Dental disorder     upper partial    Disorder of thyroid     High cholesterol     Pain     Urinary incontinence        PAST SURGICAL HISTORY:  Past Surgical History:   Procedure Laterality Date    TURP BUTTON N/A 2021    Procedure: TURP, USING BUTTON ELECTRODE-BIPOLAR.;  Surgeon: Massimo Gee M.D.;  Location: SURGERY [facility];  Service: Urology    HERNIA REPAIR      MENISCUS REPAIR Left 1978        CURRENT MEDS:  Current Facility-Administered Medications   Medication Dose Route Frequency Provider Last Rate Last Admin    acetaminophen (Tylenol) tablet 650 mg  650 mg Oral Q6HRS PRN Jenise White M.D.   650 mg at 25 0815    senna-docusate (Pericolace Or Senokot S) 8.6-50 MG per tablet 2 Tablet  2 Tablet Oral Q EVENING Jenise White M.D.        And    polyethylene glycol/lytes (Miralax) Packet 1 Packet  1 Packet Oral QDAY PRN Jenise White M.D.        atorvastatin (Lipitor) tablet 10 mg  10 mg Oral DAILY Jenise White M.D.   10 mg at 25 0533    tamsulosin (Flomax) capsule 0.8 mg  0.8 mg Oral DAILY Jenise White M.D.   0.8 mg at 25 0533    pantoprazole (Protonix) injection 40 mg  40 mg Intravenous DAILY Jenise White M.D.   40 mg at 25 0533        ALLERGIES:  Allergies   Allergen Reactions    Penicillins      Childhood reaction       SOCIAL HISTORY:  Social History     Socioeconomic History    Marital status: Single     Spouse name: Not on file    Number of children: Not on file    Years of education: Not on file    Highest education level: Not on file   Occupational History    Not on file   Tobacco Use    Smoking status: Former     Current packs/day: 0.00     Average packs/day: 1.5 packs/day for 10.0 years (15.0 ttl pk-yrs)     Types: Cigarettes     Start date:      Quit date:      Years since quittin.2    Smokeless tobacco: Former     Types: Snuff     Quit date:    Vaping Use    Vaping status: Never Used   Substance and Sexual Activity     "Alcohol use: Not Currently    Drug use: Not Currently    Sexual activity: Not on file   Other Topics Concern    Not on file   Social History Narrative    Not on file     Social Drivers of Health     Financial Resource Strain: Not on file   Food Insecurity: No Food Insecurity (3/17/2025)    Hunger Vital Sign     Worried About Running Out of Food in the Last Year: Never true     Ran Out of Food in the Last Year: Never true   Transportation Needs: No Transportation Needs (3/17/2025)    PRAPARE - Transportation     Lack of Transportation (Medical): No     Lack of Transportation (Non-Medical): No   Physical Activity: Not on file   Stress: Not on file   Social Connections: Not on file   Intimate Partner Violence: Not At Risk (3/17/2025)    Humiliation, Afraid, Rape, and Kick questionnaire     Fear of Current or Ex-Partner: No     Emotionally Abused: No     Physically Abused: No     Sexually Abused: No   Housing Stability: Low Risk  (3/17/2025)    Housing Stability Vital Sign     Unable to Pay for Housing in the Last Year: No     Number of Times Moved in the Last Year: 0     Homeless in the Last Year: No       FAMILY HISTORY:  No family history on file.     REVIEW OF SYSTEMS:  General ROS: Negative for - chills, fever, night sweats or weight loss.  HEENT ROS: Negative  Respiratory ROS: Negative for - cough or shortness of breath.  Cardiovascular ROS:  Negative for - chest pain or palpitations.  Gastrointestinal ROS: As per the history of present illness.  Genito-Urinary ROS: Negative  Musculoskeletal ROS: Negative.  Neurological ROS: Negative  Skin ROS: negative  Hematology ROS: negative  Endocrinology ROS: Negative        PHYSICAL EXAM:  VITALS: BP 99/58   Pulse 97   Temp 36.1 °C (97 °F) (Temporal)   Resp (!) 21   Ht 1.727 m (5' 8\")   Wt 66 kg (145 lb 8.1 oz)   SpO2 97%   BMI 22.12 kg/m²   GEN:  Arya Gamez is a 75 y.o. male in no acute distress.  HEENT: Mucous membranes pink and moist.  Sclera " "anicteric.    NECK:    Neck supple without lymphadenopathy or thyromegaly.  LUNGS: Clear to auscultation posteriorly.  HEART: Regular rate and rhythm. S1 and S2 normal. No murmurs, gallops  ABD:  + BS nt/nd  RECTAL: Not done at this time.  EXT:  Without cyanosis, deformity or pitting edema.  SKIN:  Pink, warm, dry.  NEURO: Grossly intact, A/OR.    LABS:  Recent Labs     03/17/25  0239   WBC 12.8*   MCV 94.2     Recent Labs     03/17/25  0239   GLUCOSE 85   BUN 29*   CO2 20     Lab Results   Component Value Date    INR 0.99 05/26/2021     No components found for: \"ALT\", \"AST\", \"GGT\", \"ALKPHOS\"  No results found for: \"BILINEO\"      @LASTIMGCAT(LO3844)@     @LASTIMGCAT(SJ2866)@       IMPRESSION/PLAN:  GI bleed  Gastric ulcers, one with large clot and treated endoscopically  Anemia - secondary to GI bleed  4.  BPH  5. S/p total knee replacement      I do not see evidence of re  bleed but we can do EGD to evaluate as there was a concern at previous facility    PPI BID  Transfuse one unit of PRBC's  EGD in am  NPO after midnight  Avoid NSAID's         Gricelda Coombs M.D.  Gastroenterology      "

## 2025-03-17 NOTE — PROGRESS NOTES
Patient has just arrived to [facility] via [facility] as a transfer from [facility]. IPASS report received from MARELY Jack via telephone. Patient arrived A&Ox4, VSS on RA, with complaints of 5/10 right knee pain. Patient received CHG bath, new gown, and 2 RN skin check (see note.) Patient pivoted from [facility] to ICU bed with MAX assist. No unmet needs at this time.

## 2025-03-17 NOTE — ASSESSMENT & PLAN NOTE
S/p 4 units of PRBCs  Etiology complex gastric ulcer possibly malignant  Bx's pending    Hb dropped to 7.6 today.   No other signs of bleeding  Continue monitoring H&H  Patient was able to ambulate with walker with standby assistance

## 2025-03-17 NOTE — THERAPY
"Physical Therapy   Initial Evaluation     Patient Name: Arya Gamez  Age:  75 y.o., Sex:  male  Medical Record #: [id number]  Today's Date: 3/17/2025     Precautions  Precautions: Fall Risk  Comments: recent R TKA    Assessment  Patient is a 75 y.o. male who was admitted with an acute GI bleed s/p multiple transfusions and EGD with \"severe complex ulcers\". Pt had a recent R TKA on 3/4/25. PMH significant for chronic pain, BPH, HLD, prostatectomy.    Pt received in bed and agreeable to PT evaluation. Pt reports he has not yet started outpatient PT following his TKA due to being admitted to the hospital. Pt presents with generalized weakness and impaired balance, but was able to mobilize at a CGA to standby level using a FWW. Pt provided with handout for post-op TKA exercises in supine and seated to perform while he is in the hospital to continue to progress knee ROM and strength. Anticipate progress to home with outpatient PT follow up. Will follow for acute PT to progress.    Plan    Physical Therapy Initial Treatment Plan   Treatment Plan : Bed Mobility, Gait Training, Neuro Re-Education / Balance, Self Care / Home Evaluation, Stair Training, Therapeutic Activities, Therapeutic Exercise  Treatment Frequency: 3 Times per Week  Duration: Until Therapy Goals Met    DC Equipment Recommendations: None  Discharge Recommendations: Recommend outpatient physical therapy services to address higher level deficits     Subjective    \"When I fell, I didn't land on my knee\"     Objective       03/17/25 1535   Precautions   Precautions Fall Risk   Comments recent R TKA   Vitals   O2 Delivery Device None - Room Air   Vitals Comments BP stable from sup>sit, asymptomatic   Pain 0 - 10 Group   Therapist Pain Assessment Post Activity Pain Same as Prior to Activity;Nurse Notified  (no c/o pain during session)   Prior Living Situation   Housing / Facility 1 [facility]   Steps Into Home 0   Steps In Home 0   Equipment Owned " Front-Wheel Walker   Lives with - Patient's Self Care Capacity Adult Children   Comments Pt reports living with his daughter typically, but has been staying with his ex-wife recently since his knee surgery as he is worried about his dogs at home bumping into him. They both have SSH with 0 CRISTINO per pt.   Prior Level of Functional Mobility   Bed Mobility Independent   Transfer Status Independent   Ambulation Independent   Assistive Devices Used Front-Wheel Walker   History of Falls   History of Falls Yes   Date of Last Fall   (reports 2 recent falls)   Cognition    Level of Consciousness Alert   Comments Very pleasant and cooperative, receptive to education   Passive ROM Lower Body   Passive ROM Lower Body WDL   Active ROM Lower Body    Comments WDL, R knee 0-90 deg approximately   Strength Lower Body   Comments BLE grossly WDL, no buckling in standing   Sensation Lower Body   Comments Denies LE sensory changes   Lower Body Muscle Tone   Lower Body Muscle Tone  WDL   Coordination Lower Body    Coordination Lower Body  WDL   Balance Assessment   Sitting Balance (Static) Fair +   Sitting Balance (Dynamic) Fair +   Standing Balance (Static) Fair   Standing Balance (Dynamic) Fair   Weight Shift Sitting Good   Weight Shift Standing Good   Comments FWW in standing   Bed Mobility    Supine to Sit Standby Assist   Scooting Standby Assist   Rolling Standby Assist   Comments HOB flat   Gait Analysis   Gait Level Of Assist Contact Guard Assist   Assistive Device Front Wheel Walker   Distance (Feet) 75   # of Times Distance was Traveled 2   Deviation Bradykinetic;Step To   Comments Cues for FWW use   Functional Mobility   Sit to Stand Contact Guard Assist   Bed, Chair, Wheelchair Transfer Contact Guard Assist   Mobility up with FWW   6 Clicks Assessment - How much HELP from from another person do you currently need... (If the patient hasn't done an activity recently, how much help from another person do you think he/she would need  if he/she tried?)   Turning from your back to your side while in a flat bed without using bedrails? 4   Moving from lying on your back to sitting on the side of a flat bed without using bedrails? 4   Moving to and from a bed to a chair (including a wheelchair)? 3   Standing up from a chair using your arms (e.g., wheelchair, or bedside chair)? 3   Walking in hospital room? 3   Climbing 3-5 steps with a railing? 3   6 clicks Mobility Score 20   Short Term Goals    Short Term Goal # 1 Pt will complete bed mobility with supervision from a flat bed to progress function in 6 visits.   Short Term Goal # 2 Pt will transfer with FWW and supervision to progress function in 6 visits.   Short Term Goal # 3 Pt will ambulate 150ft with FWW and supervision to progress function in 6 visits.   Education Group   Education Provided Role of Physical Therapist   Role of Physical Therapist Patient Response Patient;Acceptance;Explanation;Verbal Demonstration   Physical Therapy Initial Treatment Plan    Treatment Plan  Bed Mobility;Gait Training;Neuro Re-Education / Balance;Self Care / Home Evaluation;Stair Training;Therapeutic Activities;Therapeutic Exercise   Treatment Frequency 3 Times per Week   Duration Until Therapy Goals Met   Problem List    Problems Impaired Bed Mobility;Impaired Transfers;Impaired Ambulation;Decreased Activity Tolerance   Anticipated Discharge Equipment and Recommendations   DC Equipment Recommendations None   Discharge Recommendations Recommend outpatient physical therapy services to address higher level deficits   Interdisciplinary Plan of Care Collaboration   IDT Collaboration with  Nursing   Patient Position at End of Therapy Seated;Chair Alarm On;Call Light within Reach;Tray Table within Reach;Phone within Reach   Collaboration Comments RN updated   Session Information   Date / Session Number  3/17-1 (1/3, 3/23)

## 2025-03-18 ENCOUNTER — ANESTHESIA (OUTPATIENT)
Dept: SURGERY | Facility: MEDICAL CENTER | Age: 75
DRG: 841 | End: 2025-03-18
Payer: MEDICARE

## 2025-03-18 ENCOUNTER — ANESTHESIA EVENT (OUTPATIENT)
Dept: SURGERY | Facility: MEDICAL CENTER | Age: 75
DRG: 841 | End: 2025-03-18
Payer: MEDICARE

## 2025-03-18 ENCOUNTER — SURGERY (OUTPATIENT)
Age: 75
End: 2025-03-18
Payer: MEDICARE

## 2025-03-18 LAB
ALBUMIN SERPL BCP-MCNC: 2.5 G/DL (ref 3.2–4.9)
ALBUMIN/GLOB SERPL: 1.3 G/DL
ALP SERPL-CCNC: 52 U/L (ref 30–99)
ALT SERPL-CCNC: 12 U/L (ref 2–50)
ANION GAP SERPL CALC-SCNC: 7 MMOL/L (ref 7–16)
AST SERPL-CCNC: 20 U/L (ref 12–45)
BILIRUB SERPL-MCNC: 0.9 MG/DL (ref 0.1–1.5)
BUN SERPL-MCNC: 16 MG/DL (ref 8–22)
CALCIUM ALBUM COR SERPL-MCNC: 10.1 MG/DL (ref 8.5–10.5)
CALCIUM SERPL-MCNC: 8.9 MG/DL (ref 8.5–10.5)
CHLORIDE SERPL-SCNC: 106 MMOL/L (ref 96–112)
CO2 SERPL-SCNC: 21 MMOL/L (ref 20–33)
CREAT SERPL-MCNC: 0.64 MG/DL (ref 0.5–1.4)
ERYTHROCYTE [DISTWIDTH] IN BLOOD BY AUTOMATED COUNT: 50.3 FL (ref 35.9–50)
GFR SERPLBLD CREATININE-BSD FMLA CKD-EPI: 97 ML/MIN/1.73 M 2
GLOBULIN SER CALC-MCNC: 2 G/DL (ref 1.9–3.5)
GLUCOSE SERPL-MCNC: 97 MG/DL (ref 65–99)
HCT VFR BLD AUTO: 24.3 % (ref 42–52)
HCT VFR BLD AUTO: 25.4 % (ref 42–52)
HCT VFR BLD AUTO: 26 % (ref 42–52)
HGB BLD-MCNC: 7.8 G/DL (ref 14–18)
HGB BLD-MCNC: 8.1 G/DL (ref 14–18)
HGB BLD-MCNC: 8.5 G/DL (ref 14–18)
MAGNESIUM SERPL-MCNC: 1.7 MG/DL (ref 1.5–2.5)
MCH RBC QN AUTO: 29.8 PG (ref 27–33)
MCHC RBC AUTO-ENTMCNC: 32.1 G/DL (ref 32.3–36.5)
MCV RBC AUTO: 92.7 FL (ref 81.4–97.8)
PATHOLOGY CONSULT NOTE: NORMAL
PHOSPHATE SERPL-MCNC: 2.5 MG/DL (ref 2.5–4.5)
PLATELET # BLD AUTO: 340 K/UL (ref 164–446)
PMV BLD AUTO: 8.8 FL (ref 9–12.9)
POTASSIUM SERPL-SCNC: 4.3 MMOL/L (ref 3.6–5.5)
PROT SERPL-MCNC: 4.5 G/DL (ref 6–8.2)
RBC # BLD AUTO: 2.62 M/UL (ref 4.7–6.1)
SODIUM SERPL-SCNC: 134 MMOL/L (ref 135–145)
WBC # BLD AUTO: 8.3 K/UL (ref 4.8–10.8)

## 2025-03-18 PROCEDURE — 770006 HCHG ROOM/CARE - MED/SURG/GYN SEMI*

## 2025-03-18 PROCEDURE — 160035 HCHG PACU - 1ST 60 MINS PHASE I: Performed by: SPECIALIST

## 2025-03-18 PROCEDURE — 0DB68ZX EXCISION OF STOMACH, VIA NATURAL OR ARTIFICIAL OPENING ENDOSCOPIC, DIAGNOSTIC: ICD-10-PCS | Performed by: SPECIALIST

## 2025-03-18 PROCEDURE — 700101 HCHG RX REV CODE 250: Performed by: ANESTHESIOLOGY

## 2025-03-18 PROCEDURE — 160048 HCHG OR STATISTICAL LEVEL 1-5: Performed by: SPECIALIST

## 2025-03-18 PROCEDURE — 88342 IMHCHEM/IMCYTCHM 1ST ANTB: CPT | Performed by: PATHOLOGY

## 2025-03-18 PROCEDURE — 99233 SBSQ HOSP IP/OBS HIGH 50: CPT | Performed by: HOSPITALIST

## 2025-03-18 PROCEDURE — 83735 ASSAY OF MAGNESIUM: CPT

## 2025-03-18 PROCEDURE — 0DB38ZX EXCISION OF LOWER ESOPHAGUS, VIA NATURAL OR ARTIFICIAL OPENING ENDOSCOPIC, DIAGNOSTIC: ICD-10-PCS | Performed by: SPECIALIST

## 2025-03-18 PROCEDURE — 85018 HEMOGLOBIN: CPT

## 2025-03-18 PROCEDURE — 160009 HCHG ANES TIME/MIN: Performed by: SPECIALIST

## 2025-03-18 PROCEDURE — 88341 IMHCHEM/IMCYTCHM EA ADD ANTB: CPT | Mod: 91 | Performed by: PATHOLOGY

## 2025-03-18 PROCEDURE — 36415 COLL VENOUS BLD VENIPUNCTURE: CPT

## 2025-03-18 PROCEDURE — 88342 IMHCHEM/IMCYTCHM 1ST ANTB: CPT | Mod: 26 | Performed by: PATHOLOGY

## 2025-03-18 PROCEDURE — 700111 HCHG RX REV CODE 636 W/ 250 OVERRIDE (IP): Performed by: ANESTHESIOLOGY

## 2025-03-18 PROCEDURE — 700111 HCHG RX REV CODE 636 W/ 250 OVERRIDE (IP)

## 2025-03-18 PROCEDURE — 700102 HCHG RX REV CODE 250 W/ 637 OVERRIDE(OP)

## 2025-03-18 PROCEDURE — A9270 NON-COVERED ITEM OR SERVICE: HCPCS

## 2025-03-18 PROCEDURE — A9270 NON-COVERED ITEM OR SERVICE: HCPCS | Performed by: HOSPITALIST

## 2025-03-18 PROCEDURE — 88341 IMHCHEM/IMCYTCHM EA ADD ANTB: CPT | Mod: 26 | Performed by: PATHOLOGY

## 2025-03-18 PROCEDURE — 84100 ASSAY OF PHOSPHORUS: CPT

## 2025-03-18 PROCEDURE — 88305 TISSUE EXAM BY PATHOLOGIST: CPT | Mod: 26 | Performed by: PATHOLOGY

## 2025-03-18 PROCEDURE — 88305 TISSUE EXAM BY PATHOLOGIST: CPT | Performed by: PATHOLOGY

## 2025-03-18 PROCEDURE — 700102 HCHG RX REV CODE 250 W/ 637 OVERRIDE(OP): Performed by: HOSPITALIST

## 2025-03-18 PROCEDURE — 80053 COMPREHEN METABOLIC PANEL: CPT

## 2025-03-18 PROCEDURE — 160015 HCHG STAT PREOP MINUTES: Performed by: SPECIALIST

## 2025-03-18 PROCEDURE — 160202 HCHG ENDO MINUTES - 1ST 30 MINS LEVEL 3: Performed by: SPECIALIST

## 2025-03-18 PROCEDURE — 85027 COMPLETE CBC AUTOMATED: CPT

## 2025-03-18 PROCEDURE — 700105 HCHG RX REV CODE 258: Performed by: ANESTHESIOLOGY

## 2025-03-18 PROCEDURE — 85014 HEMATOCRIT: CPT

## 2025-03-18 PROCEDURE — 160002 HCHG RECOVERY MINUTES (STAT): Performed by: SPECIALIST

## 2025-03-18 RX ORDER — SODIUM CHLORIDE, SODIUM LACTATE, POTASSIUM CHLORIDE, CALCIUM CHLORIDE 600; 310; 30; 20 MG/100ML; MG/100ML; MG/100ML; MG/100ML
INJECTION, SOLUTION INTRAVENOUS
Status: DISCONTINUED | OUTPATIENT
Start: 2025-03-18 | End: 2025-03-18 | Stop reason: SURG

## 2025-03-18 RX ORDER — MEPERIDINE HYDROCHLORIDE 25 MG/ML
6.25 INJECTION INTRAMUSCULAR; INTRAVENOUS; SUBCUTANEOUS
Status: DISCONTINUED | OUTPATIENT
Start: 2025-03-18 | End: 2025-03-18 | Stop reason: HOSPADM

## 2025-03-18 RX ORDER — DEXMEDETOMIDINE HYDROCHLORIDE 100 UG/ML
INJECTION, SOLUTION INTRAVENOUS PRN
Status: DISCONTINUED | OUTPATIENT
Start: 2025-03-18 | End: 2025-03-18 | Stop reason: SURG

## 2025-03-18 RX ORDER — SODIUM CHLORIDE, SODIUM LACTATE, POTASSIUM CHLORIDE, CALCIUM CHLORIDE 600; 310; 30; 20 MG/100ML; MG/100ML; MG/100ML; MG/100ML
INJECTION, SOLUTION INTRAVENOUS CONTINUOUS
Status: DISCONTINUED | OUTPATIENT
Start: 2025-03-18 | End: 2025-03-18 | Stop reason: HOSPADM

## 2025-03-18 RX ORDER — LIDOCAINE HYDROCHLORIDE 20 MG/ML
INJECTION, SOLUTION EPIDURAL; INFILTRATION; INTRACAUDAL; PERINEURAL PRN
Status: DISCONTINUED | OUTPATIENT
Start: 2025-03-18 | End: 2025-03-18 | Stop reason: SURG

## 2025-03-18 RX ORDER — OXYCODONE HCL 5 MG/5 ML
10 SOLUTION, ORAL ORAL
Status: DISCONTINUED | OUTPATIENT
Start: 2025-03-18 | End: 2025-03-18 | Stop reason: HOSPADM

## 2025-03-18 RX ORDER — HALOPERIDOL 5 MG/ML
1 INJECTION INTRAMUSCULAR
Status: DISCONTINUED | OUTPATIENT
Start: 2025-03-18 | End: 2025-03-18 | Stop reason: HOSPADM

## 2025-03-18 RX ORDER — ONDANSETRON 2 MG/ML
4 INJECTION INTRAMUSCULAR; INTRAVENOUS
Status: DISCONTINUED | OUTPATIENT
Start: 2025-03-18 | End: 2025-03-18 | Stop reason: HOSPADM

## 2025-03-18 RX ORDER — OXYCODONE HCL 5 MG/5 ML
5 SOLUTION, ORAL ORAL
Status: DISCONTINUED | OUTPATIENT
Start: 2025-03-18 | End: 2025-03-18 | Stop reason: HOSPADM

## 2025-03-18 RX ORDER — DIPHENHYDRAMINE HYDROCHLORIDE 50 MG/ML
12.5 INJECTION, SOLUTION INTRAMUSCULAR; INTRAVENOUS
Status: DISCONTINUED | OUTPATIENT
Start: 2025-03-18 | End: 2025-03-18 | Stop reason: HOSPADM

## 2025-03-18 RX ORDER — ETHYL ALCOHOL 62 %
1 SWAB, MEDICATED TOPICAL 2 TIMES DAILY
Status: DISCONTINUED | OUTPATIENT
Start: 2025-03-18 | End: 2025-03-20

## 2025-03-18 RX ORDER — OMEPRAZOLE 20 MG/1
40 CAPSULE, DELAYED RELEASE ORAL 2 TIMES DAILY
Status: DISCONTINUED | OUTPATIENT
Start: 2025-03-18 | End: 2025-03-20 | Stop reason: HOSPADM

## 2025-03-18 RX ADMIN — LIDOCAINE HYDROCHLORIDE 100 MG: 20 INJECTION, SOLUTION EPIDURAL; INFILTRATION; INTRACAUDAL; PERINEURAL at 07:52

## 2025-03-18 RX ADMIN — ACETAMINOPHEN 650 MG: 325 TABLET, FILM COATED ORAL at 02:01

## 2025-03-18 RX ADMIN — DEXMEDETOMIDINE HYDROCHLORIDE 50 MCG: 100 INJECTION, SOLUTION INTRAVENOUS at 07:57

## 2025-03-18 RX ADMIN — SODIUM CHLORIDE, POTASSIUM CHLORIDE, SODIUM LACTATE AND CALCIUM CHLORIDE: 600; 310; 30; 20 INJECTION, SOLUTION INTRAVENOUS at 07:50

## 2025-03-18 RX ADMIN — SENNOSIDES AND DOCUSATE SODIUM 2 TABLET: 50; 8.6 TABLET ORAL at 18:13

## 2025-03-18 RX ADMIN — OMEPRAZOLE 40 MG: 20 CAPSULE, DELAYED RELEASE ORAL at 18:13

## 2025-03-18 RX ADMIN — DEXMEDETOMIDINE HYDROCHLORIDE 20 MCG: 100 INJECTION, SOLUTION INTRAVENOUS at 07:52

## 2025-03-18 RX ADMIN — Medication 1 APPLICATOR: at 18:13

## 2025-03-18 RX ADMIN — PROPOFOL 20 MG: 10 INJECTION, EMULSION INTRAVENOUS at 07:52

## 2025-03-18 RX ADMIN — ATORVASTATIN CALCIUM 10 MG: 10 TABLET, FILM COATED ORAL at 05:26

## 2025-03-18 RX ADMIN — ACETAMINOPHEN 650 MG: 325 TABLET, FILM COATED ORAL at 20:04

## 2025-03-18 RX ADMIN — PANTOPRAZOLE SODIUM 40 MG: 40 INJECTION, POWDER, FOR SOLUTION INTRAVENOUS at 05:26

## 2025-03-18 RX ADMIN — Medication 1 APPLICATOR: at 05:26

## 2025-03-18 RX ADMIN — TAMSULOSIN HYDROCHLORIDE 0.8 MG: 0.4 CAPSULE ORAL at 05:26

## 2025-03-18 ASSESSMENT — PAIN SCALES - GENERAL: PAIN_LEVEL: 0

## 2025-03-18 ASSESSMENT — ENCOUNTER SYMPTOMS
DIARRHEA: 0
LOSS OF CONSCIOUSNESS: 0
ABDOMINAL PAIN: 0
CHILLS: 0
DIZZINESS: 0
SHORTNESS OF BREATH: 0
NAUSEA: 0
BACK PAIN: 0
HEADACHES: 0
PALPITATIONS: 0
COUGH: 0
FEVER: 0
VOMITING: 0

## 2025-03-18 ASSESSMENT — PAIN DESCRIPTION - PAIN TYPE
TYPE: ACUTE PAIN

## 2025-03-18 ASSESSMENT — PATIENT HEALTH QUESTIONNAIRE - PHQ9
1. LITTLE INTEREST OR PLEASURE IN DOING THINGS: NOT AT ALL
SUM OF ALL RESPONSES TO PHQ9 QUESTIONS 1 AND 2: 0
2. FEELING DOWN, DEPRESSED, IRRITABLE, OR HOPELESS: NOT AT ALL

## 2025-03-18 ASSESSMENT — FIBROSIS 4 INDEX: FIB4 SCORE: 1.38

## 2025-03-18 NOTE — PROCEDURES
OPERATIVE REPORT    PATIENT:   Arya Gamez   1950       PREOPERATIVE DIAGNOSES/INDICATIONS: GI bleed with ulcer    POSTOPERATIVE DIAGNOSIS: Large ulcer without stigmata of recent bleed or blood seen    PROCEDURE:  ESOPHAGOGASTRODUODENOSCOPY with biopsy    PHYSICIAN:  Gricelda Coombs MD    ANESTHESIA:  Per anesthesiologist.    ESTIMATED BLOOD LOSS:nil    LOCATION: [facility]    CONSENT:  OBTAINED. The risks, benefits and alternatives of the procedure were discussed in details. The risks include and are not limited to bleeding, infection, perforation, missed lesions, and sedations risks (cardiopulmonary compromise and allergic reaction to medications).    DESCRIPTION: The patient presented to the procedure room.  After routine checkup was performed, patient was brought into the endoscopy suite.  Patient was placed on his left lateral decubitus position. A bite block was placed in patient's mouth. Patient was sedated by anesthesia.  Vital signs were monitored throughout the procedure.  Oxygenation support was provided throughout the procedure. Upper endoscope was inserted into patient's mouth and advanced to the second portion of the duodenum under direct visualization.      Once the site was reached and examined, the upper endoscope was withdrawn.  Retroflexion was made within the stomach.  The stomach was decompressed, scope was withdrawn and the procedure was terminated.     The patient tolerated the procedure well.  There were no immediate complications.    OPERATIVE FINDINGS:    1. Esophagus: 2 cm of salmon colored mucosa is distal esophagus. Biopsied for Weber's  2. Stomach: small hiatal hernia, large ulcer expanding about 6 cm in lesser curvature of stomach(incisura to antrum) Clips still intact and no further visible vessels seen of pigment spots. Cratered and abnormal mucosa surrounding. Extensively biopsied. A few clean based small ulcer surrounding ulcer, seems to be part of the same  process  3. Duodenum: normal bulb and second portion     IMPRESSION/RECOMMENDATIONS:  Large gastric ulcer - not bleeding. I do not think he re bleed. I think his hemoglobin was equilibrating with massive GI bleed originally. He has had only a few small dark stools. Vitals and stooling do not represent re bleed either.   PPI BID for three months  No NSAIDS  Await biopsy - concern for malignancy  Treat H. Pylori if positive    EGD in three months if biopsies are negative to assess for healing and re biopsy        This note has been transcribed with digital voice recognition software and although it has been reviewed may contain grammatical or word errors

## 2025-03-18 NOTE — CARE PLAN
The patient is Watcher - Medium risk of patient condition declining or worsening    Shift Goals  Clinical Goals: H/H q 8, hemodynamic stability, monitor for s/sx of bleeding  Patient Goals: rest  Family Goals: CRISTINA    Progress made toward(s) clinical / shift goals:    Problem: Knowledge Deficit - Standard  Goal: Patient and family/care givers will demonstrate understanding of plan of care, disease process/condition, diagnostic tests and medications  Outcome: Progressing     Problem: Pain - Standard  Goal: Alleviation of pain or a reduction in pain to the patient’s comfort goal  Outcome: Progressing     Problem: Skin Integrity  Goal: Skin integrity is maintained or improved  Outcome: Progressing     Problem: Fall Risk  Goal: Patient will remain free from falls  Outcome: Progressing     Problem: Psychosocial  Goal: Patient's level of anxiety will decrease  Outcome: Progressing  Goal: Patient's ability to verbalize feelings about condition will improve  Outcome: Progressing     Problem: Hemodynamics  Goal: Patient's hemodynamics, fluid balance and neurologic status will be stable or improve  Outcome: Progressing     Problem: Respiratory  Goal: Patient will achieve/maintain optimum respiratory ventilation and gas exchange  Outcome: Progressing     Problem: Venous Thromboembolism (VTE) Prevention  Goal: The patient will remain free from venous thromboembolism (VTE)  Outcome: Progressing     Problem: Nutrition  Goal: Patient's nutritional and fluid intake will be adequate or improve  Outcome: Progressing     Problem: Urinary Elimination  Goal: Establish and maintain regular urinary output  Outcome: Progressing     Problem: Bowel Elimination  Goal: Establish and maintain regular bowel function  Outcome: Progressing       Patient is not progressing towards the following goals:

## 2025-03-18 NOTE — ANESTHESIA POSTPROCEDURE EVALUATION
Patient: Arya Gamez    Procedure Summary       Date: 03/18/25 Room / Location: [facility] ROOM 26 / SURGERY SAME DAY [facility]    Anesthesia Start: 0750 Anesthesia Stop: 0807    Procedure: GASTROSCOPY WITH BIOPSY (Esophagus) Diagnosis: (Weber's  esophagus, gastric ulcer)    Surgeons: Gricelda Coombs M.D. Responsible Provider: Anette Finley M.D.    Anesthesia Type: general ASA Status: 3            Final Anesthesia Type: general  Last vitals  BP   Blood Pressure : 102/58, NIBP: 138/66    Temp   36.4 °C (97.5 °F)    Pulse   82   Resp   17    SpO2   96 %      Anesthesia Post Evaluation    Patient location during evaluation: PACU  Patient participation: complete - patient participated  Level of consciousness: awake and alert  Pain score: 0    Airway patency: patent  Anesthetic complications: no  Cardiovascular status: hemodynamically stable  Respiratory status: acceptable  Hydration status: euvolemic    PONV: none          No notable events documented.     Nurse Pain Score: 0 (NPRS)

## 2025-03-18 NOTE — PROGRESS NOTES
Hospital Medicine Daily Progress Note    Date of Service  3/18/2025    Chief Complaint  Arya Gamez is a 75 y.o. male admitted 3/17/2025 with fatigue    Hospital Course  74yo BPHx s/p prostatectomy, chronic pain, HLD, recent TKA 2wks prior to presentation and was taking ASA for DVT prophylaxis and then started taking NSAIDs for pain.  Presented with weakness and Hgb of 5 to an outlying hospital.  Needed 4 units of PRBCs and EGD showing complex ulcer disease.  He was then sent to us due to concern that he might require IR or surgical interventions.    EGD 3/18: again seen large possible malignant ulcer, no acute signs of bleeding      Interval Problem Update  No complaints    Sinus 80-90  -120  On RA  AFebrile    I have discussed this patient's plan of care and discharge plan at IDT rounds today with Case Management, Nursing, Nursing leadership, and other members of the IDT team.    Consultants/Specialty  GI    Code Status  Full Code    Disposition  The patient is not medically cleared for discharge to home or a post-acute facility.      I have placed the appropriate orders for post-discharge needs.    Review of Systems  Review of Systems   Constitutional:  Negative for chills and fever.   Respiratory:  Negative for cough and shortness of breath.    Cardiovascular:  Negative for chest pain, palpitations and leg swelling.   Gastrointestinal:  Negative for abdominal pain, diarrhea, nausea and vomiting.   Genitourinary:  Negative for dysuria and urgency.   Musculoskeletal:  Negative for back pain.   Skin:  Negative for rash.   Neurological:  Negative for dizziness, loss of consciousness and headaches.        Physical Exam  Temp:  [36 °C (96.8 °F)-36.3 °C (97.4 °F)] 36 °C (96.8 °F)  Pulse:  [] 103  Resp:  [11-30] 28  BP: ()/(55-68) 119/60  SpO2:  [92 %-99 %] 95 %    Physical Exam  Constitutional:       General: He is not in acute distress.     Appearance: He is well-developed. He is not  diaphoretic.   HENT:      Head: Normocephalic and atraumatic.   Eyes:      Conjunctiva/sclera: Conjunctivae normal.   Neck:      Vascular: No JVD.   Cardiovascular:      Rate and Rhythm: Normal rate.      Heart sounds: No murmur heard.     No gallop.   Pulmonary:      Effort: Pulmonary effort is normal. No respiratory distress.      Breath sounds: No stridor. No wheezing or rales.   Abdominal:      Palpations: Abdomen is soft.      Tenderness: There is no abdominal tenderness. There is no guarding or rebound.   Skin:     General: Skin is warm and dry.      Findings: No rash.   Neurological:      Mental Status: He is oriented to person, place, and time.   Psychiatric:         Thought Content: Thought content normal.         Fluids    Intake/Output Summary (Last 24 hours) at 3/18/2025 0657  Last data filed at 3/18/2025 0500  Gross per 24 hour   Intake 772 ml   Output 2400 ml   Net -1628 ml        Laboratory  Recent Labs     03/17/25  0239 03/17/25  0928 03/17/25  1735 03/18/25  0157   WBC 12.8*  --   --  8.3   RBC 2.43*  --   --  2.62*   HEMOGLOBIN 7.2* 7.2* 9.1* 7.8*   HEMATOCRIT 22.9* 21.7* 28.5* 24.3*   MCV 94.2  --   --  92.7   MCH 29.6  --   --  29.8   MCHC 31.4*  --   --  32.1*   RDW 50.6*  --   --  50.3*   PLATELETCT 340  --   --  340   MPV 8.9*  --   --  8.8*     Recent Labs     03/17/25  0239 03/18/25  0157   SODIUM 135 134*   POTASSIUM 4.2 4.3   CHLORIDE 108 106   CO2 20 21   GLUCOSE 85 97   BUN 29* 16   CREATININE 0.72 0.64   CALCIUM 8.6 8.9                   Imaging  No orders to display        Assessment/Plan  * GI bleed- (present on admission)  Assessment & Plan  He was given ASA 81mg for VTE ppx for 28 days and ibuprofen for pain med after right knee replacement.  He presented with weakness and anemia, with a hemoglobin of 5 on 3/15. He received 2 units of packed red blood cells (pRBCs), which improved his hemoglobin and hematocrit. However, by the morning of 3/16, his hemoglobin had dropped back to 5,  so he was transfused with another 2 units of pRBCs. By 4 PM on 3/16, his hemoglobin had risen to 7.9. An EGD was performed today, revealing severe complex ulcers, which were treated with clipping and cautery.   -CTM H&H, transfuse if Hb <7  -IV pantoprazole 40 mg: change to po omeprazole BID  Repeat EGD 3/18: complex ulcer again seen.  Bx taken.  No signs of active bleeding    Acute blood loss anemia  Assessment & Plan  S/p 4 units of PRBCs  Etiology complex gastric ulcer possibly malignant  Bx's pending    Dyslipidemia  Assessment & Plan  C/w atorvastatin    Benign prostatic hyperplasia with urinary obstruction- (present on admission)  Assessment & Plan  -c/w Tamsulosin    S/P total knee arthroplasty, right- (present on admission)  Assessment & Plan  RIGHT TOTAL KNEE ARTHROPLASTY,  (Right) on 3/4/25  KNEE ARTHROPLASTY TOTAL REPLACEMENT COMPUTER/ROBOTIC ASSISTED  He was given ASA 81mg for VTE ppx and ibuprofen for pain med.  -PT/OT in am         VTE prophylaxis: VTE Selection    I have performed a physical exam and reviewed and updated ROS and Plan today (3/18/2025). In review of yesterday's note (3/17/2025), there are no changes except as documented above.

## 2025-03-18 NOTE — DISCHARGE PLANNING
Case Management Discharge Planning    Admission Date: 3/17/2025  GMLOS: 2.9  ALOS: 1    6-Clicks ADL Score: 21  6-Clicks Mobility Score: 20      Anticipated Discharge Dispo: Discharge Disposition: D/T to SNF with Medicare cert in anticipation of skilled care (03)  Discharge Contact Phone Number: [phone]    Action(s) Taken:   RN CCM met with pt at bedside.  Pt stated he lives in a 1 story house with his daughter, Kevin.  Pt drives and is independent with ADLs and IADLs at baseline.  His daughter requested referral to [facility] for a swing bed.  Pt agreeable.     Medically Clear: No    Next Steps:   Follow up with [facility] for a swing bed.    Barriers to Discharge: Medical clearance    Care Transition Team Assessment    Information Source  Orientation Level: Oriented X4  Information Given By: Patient  Who is responsible for making decisions for patient? : Patient    Readmission Evaluation  Is this a readmission?: No    Elopement Risk  Legal Hold: No  Ambulatory or Self Mobile in Wheelchair: Yes  Disoriented: No  Psychiatric Symptoms: None  History of Wandering: No  Elopement this Admit: No  Vocalizing Wanting to Leave: No  Displays Behaviors, Body Language Wanting to Leave: No-Not at Risk for Elopement  Elopement Risk: Not at Risk for Elopement    Interdisciplinary Discharge Planning  Lives with - Patient's Self Care Capacity: Adult Children  Patient or legal guardian wants to designate a caregiver: No  Support Systems: Friends / Neighbors, Family Member(s)  Housing / Facility: 1 Story House    Discharge Preparedness  What is your plan after discharge?: Uncertain - pending medical team collaboration  What are your discharge supports?: Child  Prior Functional Level: Ambulatory, Drives Self, Independent with Activities of Daily Living, Independent with Medication Management  Difficulity with ADLs: Bathing, Dressing, Toileting, Walking  Difficulity with IADLs: Cooking, Driving,  Laundry, Managing medication, Shopping    Functional Assesment  Prior Functional Level: Ambulatory, Drives Self, Independent with Activities of Daily Living, Independent with Medication Management    Finances  Financial Barriers to Discharge: No  Prescription Coverage: No    Vision / Hearing Impairment  Vision Impairment : Yes  Right Eye Vision: Impaired, Wears Glasses  Left Eye Vision: Impaired, Wears Glasses  Hearing Impairment : No         Advance Directive  Advance Directive?: None    Domestic Abuse  Possible Abuse/Neglect Reported to:: Not Applicable    Psychological Assessment  History of Substance Abuse: None  History of Psychiatric Problems: No    Discharge Risks or Barriers  Discharge risks or barriers?: No  Patient risk factors: Cognitive / sensory / physical deficit    Anticipated Discharge Information  Discharge Disposition: D/T to SNF with Medicare cert in anticipation of skilled care (03)  Discharge Contact Phone Number: [phone]

## 2025-03-18 NOTE — CARE PLAN
The patient is Watcher - Medium risk of patient condition declining or worsening    Shift Goals  Clinical Goals: pt will remain safe throughout shift with all needs met; pts skin integrity will remain intact throughout shift; Pt hemoglobin will remain above 7 throughout shift  Patient Goals: rest  Family Goals: CRISTINA    Progress made toward(s) clinical / shift goals:  Pt remained safe throughout shift due to communication, rest, hourly rounding, and medication administration. Pts skin integrity remained intact due to mepilex, skin check, communication, education, ambulation and repositioning. Pts hemoglobin has remained above 7 throughout shift.    Patient is not progressing towards the following goals: NA

## 2025-03-18 NOTE — PROGRESS NOTES
4 Eyes Skin Assessment Completed by Mel RN and MARELY Quinonez.    Head Scab and Redness Pt states eczema       Ears Redness and Blanching    Nose Redness and Blanching       Mouth WDL    Neck WDL    Breast/Chest WDL old growth       Shoulder Blades WDL    Spine Redness and Blanching      (R) Arm/Elbow/Hand Redness and blanching      (L) Arm/Elbow/Hand Redness and Blanching    Abdomen WDL    Groin WDL    Scrotum/Coccyx/Buttocks Redness and Blanching lipoma on R side      (R) Leg Recent Knee surgery; Incision closed with surgical glue      (L) Leg WDL      (R) Heel/Foot/Toe Redness and Blanching      (L) Heel/Foot/Toe Redness and Blanching            Devices In Places Blood Pressure Cuff      Interventions In Place Heel Mepilex, Sacral Mepilex, Pillows, and Barrier Cream    Possible Skin Injury Yes    Pictures Uploaded Into Epic Yes  Wound Consult Placed wound consult already placed  RN Wound Prevention Protocol Ordered Yes

## 2025-03-18 NOTE — OR NURSING
0805- pt arrives from OR to PACU 1, report received from RN and anesthesia. Pt place on monitor. VSS,  NAD noted. O2 8L via mask.      0815-pt wakes, tolerating sips of water. Updated on POC    0820-report called to JULISSA Jean Baptiste RN    0830-pt transported back to T634, all belongings accounted for.

## 2025-03-18 NOTE — ANESTHESIA TIME REPORT
Anesthesia Start and Stop Event Times       Date Time Event    3/18/2025 0715 Ready for Procedure     0750 Anesthesia Start     0807 Anesthesia Stop          Responsible Staff  03/18/25      Name Role Begin End    Anette Finley M.D. Anesth 0750 0807          Overtime Reason:  no overtime (within assigned shift)    Comments:

## 2025-03-19 LAB
EKG IMPRESSION: NORMAL
HCT VFR BLD AUTO: 23.1 % (ref 42–52)
HCT VFR BLD AUTO: 23.9 % (ref 42–52)
HGB BLD-MCNC: 7.6 G/DL (ref 14–18)
HGB BLD-MCNC: 7.8 G/DL (ref 14–18)
MAGNESIUM SERPL-MCNC: 1.6 MG/DL (ref 1.5–2.5)

## 2025-03-19 PROCEDURE — 770006 HCHG ROOM/CARE - MED/SURG/GYN SEMI*

## 2025-03-19 PROCEDURE — 99233 SBSQ HOSP IP/OBS HIGH 50: CPT | Performed by: HOSPITALIST

## 2025-03-19 PROCEDURE — 700102 HCHG RX REV CODE 250 W/ 637 OVERRIDE(OP): Performed by: HOSPITALIST

## 2025-03-19 PROCEDURE — A9270 NON-COVERED ITEM OR SERVICE: HCPCS

## 2025-03-19 PROCEDURE — 83735 ASSAY OF MAGNESIUM: CPT

## 2025-03-19 PROCEDURE — 700102 HCHG RX REV CODE 250 W/ 637 OVERRIDE(OP)

## 2025-03-19 PROCEDURE — 85018 HEMOGLOBIN: CPT

## 2025-03-19 PROCEDURE — 85014 HEMATOCRIT: CPT

## 2025-03-19 PROCEDURE — 93005 ELECTROCARDIOGRAM TRACING: CPT | Mod: TC | Performed by: NURSE PRACTITIONER

## 2025-03-19 PROCEDURE — 97165 OT EVAL LOW COMPLEX 30 MIN: CPT

## 2025-03-19 PROCEDURE — A9270 NON-COVERED ITEM OR SERVICE: HCPCS | Performed by: HOSPITALIST

## 2025-03-19 RX ADMIN — ACETAMINOPHEN 650 MG: 325 TABLET, FILM COATED ORAL at 15:03

## 2025-03-19 RX ADMIN — ACETAMINOPHEN 650 MG: 325 TABLET, FILM COATED ORAL at 02:45

## 2025-03-19 RX ADMIN — TAMSULOSIN HYDROCHLORIDE 0.8 MG: 0.4 CAPSULE ORAL at 04:41

## 2025-03-19 RX ADMIN — ACETAMINOPHEN 650 MG: 325 TABLET, FILM COATED ORAL at 21:20

## 2025-03-19 RX ADMIN — Medication 1 APPLICATOR: at 04:41

## 2025-03-19 RX ADMIN — OMEPRAZOLE 40 MG: 20 CAPSULE, DELAYED RELEASE ORAL at 16:34

## 2025-03-19 RX ADMIN — ATORVASTATIN CALCIUM 10 MG: 10 TABLET, FILM COATED ORAL at 04:41

## 2025-03-19 RX ADMIN — Medication 1 APPLICATOR: at 16:35

## 2025-03-19 RX ADMIN — OMEPRAZOLE 40 MG: 20 CAPSULE, DELAYED RELEASE ORAL at 04:41

## 2025-03-19 RX ADMIN — SENNOSIDES AND DOCUSATE SODIUM 2 TABLET: 50; 8.6 TABLET ORAL at 16:35

## 2025-03-19 ASSESSMENT — COGNITIVE AND FUNCTIONAL STATUS - GENERAL
DAILY ACTIVITIY SCORE: 24
SUGGESTED CMS G CODE MODIFIER DAILY ACTIVITY: CH

## 2025-03-19 ASSESSMENT — PAIN DESCRIPTION - PAIN TYPE
TYPE: ACUTE PAIN

## 2025-03-19 ASSESSMENT — ENCOUNTER SYMPTOMS
COUGH: 0
FEVER: 0
ABDOMINAL PAIN: 0
DIARRHEA: 0
CHILLS: 0
PALPITATIONS: 0
HEADACHES: 0
LOSS OF CONSCIOUSNESS: 0
VOMITING: 0
DIZZINESS: 0
SHORTNESS OF BREATH: 0
NAUSEA: 0
BACK PAIN: 0

## 2025-03-19 ASSESSMENT — ACTIVITIES OF DAILY LIVING (ADL): TOILETING: INDEPENDENT

## 2025-03-19 NOTE — DISCHARGE PLANNING
Case Management Discharge Planning    Admission Date: 3/17/2025  GMLOS: 2.9  ALOS: 2    6-Clicks ADL Score: 21  6-Clicks Mobility Score: 20      Anticipated Discharge Dispo: Discharge Disposition: D/T to SNF with Medicare cert in anticipation of skilled care (03)  Discharge Contact Phone Number: [phone]    DME Needed: TBD    Action(s) Taken: Chart reviewed, pt discussed in IDT rounds. Pt and daughter are wanting placement at [facility] for swing bed. Pt recommending outpatient PT, explained this to patient and daughter, Kevin, but they are insistent on it. RN JOHANNA called [facility] and spoke with Anu who is stating that unless there is a medical need other than therapies, he would not qualify, and he needs another midnight, so at this point they would decline him. RN CM updated Kevin of this and she was extremely upset since she lives with him but she is at work all day and he can't be left alone, he has fallen twice. RN JOHANNA discussed home health but pt had prior and they do not feel he is safe. Patient's hemoglobin still being monitored at this point. Will f/u with [facility] again once pt medically cleared.    Escalations Completed: None    Medically Clear: No    Next Steps: Follow for discharge needs    Barriers to Discharge: Medical clearance    Is the patient up for discharge tomorrow: TBD

## 2025-03-19 NOTE — THERAPY
Occupational Therapy   Initial Evaluation     Patient Name: Arya Gamez  Age:  75 y.o., Sex:  male  Medical Record #: [id number]  Today's Date: 3/19/2025     Precautions  Precautions: Fall Risk  Comments: recent R TKA    Assessment  Patient is 75 y.o. male with a diagnosis of weakness, anemia.  Pt is at or near his/her functional baseline. Pt with no further skilled OT needs in the acute care setting at this time.        Plan    Occupational Therapy Initial Treatment Plan   Duration: (P) Discharge Needs Only       Discharge Recommendations: (P) Recommend home health for continued occupational therapy services        03/19/25 1110   Prior Living Situation   Housing / Facility 1 Story House   Steps Into Home 0   Steps In Home 0   Equipment Owned Front-Wheel Walker   Lives with - Patient's Self Care Capacity Adult Children   Prior Level of ADL Function   Self Feeding Independent   Grooming / Hygiene Independent   Bathing Independent   Dressing Independent   Toileting Independent   Balance Assessment   Sitting Balance (Static) Fair +   Sitting Balance (Dynamic) Fair +   Standing Balance (Static) Fair   Standing Balance (Dynamic) Fair  (with FWW)   Weight Shift Sitting Good   Weight Shift Standing Good   ADL Assessment   Grooming Supervision   Upper Body Dressing Supervision   Lower Body Dressing Supervision   Toileting Supervision   Functional Mobility   Sit to Stand Standby Assist   Bed, Chair, Wheelchair Transfer Standby Assist   Occupational Therapy Initial Treatment Plan    Duration Discharge Needs Only   Anticipated Discharge Equipment and Recommendations   Discharge Recommendations Recommend home health for continued occupational therapy services

## 2025-03-19 NOTE — PROGRESS NOTES
Hospital Medicine Daily Progress Note    Date of Service  3/19/2025    Chief Complaint  Arya Gamez is a 75 y.o. male admitted 3/17/2025 with fatigue    Hospital Course  74yo BPHx s/p prostatectomy, chronic pain, HLD, recent TKA 2wks prior to presentation and was taking ASA for DVT prophylaxis and then started taking NSAIDs for pain.  Presented with weakness and Hgb of 5 to an outlying hospital.  Needed 4 units of PRBCs and EGD showing complex ulcer disease.  He was then sent to us due to concern that he might require IR or surgical interventions.    EGD 3/18: again seen large possible malignant ulcer, no acute signs of bleeding      Interval Problem Update  No complaints    Sinus 80-90  -120  On RA  AFebrile          3/19 patient is new to me today, patient is in bed, he is alert oriented follows commands denies any abdominal pain, I have reviewed note from GI, reviewed note from previous hospitalist, hemoglobin dropped from 8.5-7.6, blood pressure is stable, I discussed with PT OT patient did well with ambulation at this time recommending home health versus outpatient PT OT, will continue trending hemoglobin, blood pressure stable, no signs of active bleeding, if hemoglobin remained stable hopefully he will be able to discharge home tomorrow.  Discussed with bedside nurse charge nurse  pharmacist      I have discussed this patient's plan of care and discharge plan at IDT rounds today with Case Management, Nursing, Nursing leadership, and other members of the IDT team.    Consultants/Specialty  GI    Code Status  Full Code    Disposition  The patient is not medically cleared for discharge to home or a post-acute facility.      I have placed the appropriate orders for post-discharge needs.    Review of Systems  Review of Systems   Constitutional:  Negative for chills and fever.   Respiratory:  Negative for cough and shortness of breath.    Cardiovascular:  Negative for chest pain,  palpitations and leg swelling.   Gastrointestinal:  Negative for abdominal pain, diarrhea, nausea and vomiting.   Genitourinary:  Negative for dysuria and urgency.   Musculoskeletal:  Negative for back pain.   Skin:  Negative for rash.   Neurological:  Negative for dizziness, loss of consciousness and headaches.        Physical Exam  Temp:  [36.7 °C (98.1 °F)-37 °C (98.6 °F)] 37 °C (98.6 °F)  Pulse:  [] 104  Resp:  [17-18] 18  BP: (108-133)/(60-76) 114/65  SpO2:  [93 %-98 %] 93 %    Physical Exam  Vitals and nursing note reviewed.   Constitutional:       General: He is not in acute distress.     Appearance: He is well-developed. He is not diaphoretic.   HENT:      Head: Normocephalic and atraumatic.   Eyes:      General: No scleral icterus.  Neck:      Vascular: No JVD.   Cardiovascular:      Rate and Rhythm: Normal rate.      Heart sounds: No murmur heard.     No gallop.   Pulmonary:      Effort: Pulmonary effort is normal. No respiratory distress.      Breath sounds: No stridor. No wheezing or rales.   Abdominal:      Palpations: Abdomen is soft.      Tenderness: There is no abdominal tenderness. There is no guarding or rebound.   Skin:     General: Skin is warm and dry.      Findings: No rash.   Neurological:      Mental Status: He is alert and oriented to person, place, and time.   Psychiatric:         Thought Content: Thought content normal.         Fluids    Intake/Output Summary (Last 24 hours) at 3/19/2025 1438  Last data filed at 3/19/2025 0800  Gross per 24 hour   Intake 240 ml   Output 825 ml   Net -585 ml        Laboratory  Recent Labs     03/17/25  0239 03/17/25  0928 03/18/25  0157 03/18/25  0900 03/18/25  2113 03/19/25  0816   WBC 12.8*  --  8.3  --   --   --    RBC 2.43*  --  2.62*  --   --   --    HEMOGLOBIN 7.2*   < > 7.8* 8.1* 8.5* 7.6*   HEMATOCRIT 22.9*   < > 24.3* 25.4* 26.0* 23.1*   MCV 94.2  --  92.7  --   --   --    MCH 29.6  --  29.8  --   --   --    MCHC 31.4*  --  32.1*  --   --    --    RDW 50.6*  --  50.3*  --   --   --    PLATELETCT 340  --  340  --   --   --    MPV 8.9*  --  8.8*  --   --   --     < > = values in this interval not displayed.     Recent Labs     03/17/25  0239 03/18/25  0157   SODIUM 135 134*   POTASSIUM 4.2 4.3   CHLORIDE 108 106   CO2 20 21   GLUCOSE 85 97   BUN 29* 16   CREATININE 0.72 0.64   CALCIUM 8.6 8.9                   Imaging  No orders to display        Assessment/Plan  * GI bleed- (present on admission)  Assessment & Plan  He was given ASA 81mg for VTE ppx for 28 days and ibuprofen for pain med after right knee replacement.  He presented with weakness and anemia, with a hemoglobin of 5 on 3/15. He received 2 units of packed red blood cells (pRBCs), which improved his hemoglobin and hematocrit. However, by the morning of 3/16, his hemoglobin had dropped back to 5, so he was transfused with another 2 units of pRBCs. By 4 PM on 3/16, his hemoglobin had risen to 7.9. An EGD was performed today, revealing severe complex ulcers, which were treated with clipping and cautery.   -CTM H&H, transfuse if Hb <7  -IV pantoprazole 40 mg: change to po omeprazole BID  Repeat EGD 3/18: complex ulcer again seen.  Bx taken.  No signs of active bleeding    No signs of bleeding, hemoglobin dropped to 7.6 from 8.5 blood pressure stable.  Continue monitoring H&H    Acute blood loss anemia  Assessment & Plan  S/p 4 units of PRBCs  Etiology complex gastric ulcer possibly malignant  Bx's pending    Hb dropped to 7.6 today.   No other signs of bleeding  Continue monitoring H&H  Patient was able to ambulate with walker with standby assistance    Dyslipidemia  Assessment & Plan  C/w atorvastatin    Benign prostatic hyperplasia with urinary obstruction- (present on admission)  Assessment & Plan  -c/w Tamsulosin    S/P total knee arthroplasty, right- (present on admission)  Assessment & Plan  RIGHT TOTAL KNEE ARTHROPLASTY,  (Right) on 3/4/25  KNEE ARTHROPLASTY TOTAL REPLACEMENT  COMPUTER/ROBOTIC ASSISTED  He was given ASA 81mg for VTE ppx and ibuprofen for pain med.  -PT/OT eval recommending home health versus outpatient PT OT         VTE prophylaxis: SCDs    I have performed a physical exam and reviewed and updated ROS and Plan today (3/19/2025). In review of yesterday's note (3/18/2025), there are no changes except as documented above.      Total time of 52 minutes spent prepping to see patient (e.g. reviewing  tests/imaging results, notes from consultants, bedside nurse, night shift ) obtaining and/or reviewing separately obtained history. Performing a medically appropriate examination and evaluation.  Counseling and educating the patient.  Ordering medications, tests, or procedures.  Referring and communicating with other health care professionals.  Documenting clinical information in EPIC.  Independently interpreting results and communicating results to patient.  Care coordination.

## 2025-03-19 NOTE — CARE PLAN
Problem: Knowledge Deficit - Standard  Goal: Patient and family/care givers will demonstrate understanding of plan of care, disease process/condition, diagnostic tests and medications  Description: Target End Date:  1-3 days or as soon as patient condition allowsDocument in Patient Education1.  Patient and family/caregiver oriented to unit, equipment, visitation policy and means for communicating concern2.  Complete/review Learning Assessment3.  Assess knowledge level of disease process/condition, treatment plan, diagnostic tests and medications4.  Explain disease process/condition, treatment plan, diagnostic tests and medications  Outcome: Progressing   The patient is Stable - Low risk of patient condition declining or worsening    Shift Goals  Clinical Goals: Pain less than 6 with interventions, mobility, pt safety  Patient Goals: Rest, comfort  Family Goals: CRISTINA    Progress made toward(s) clinical / shift goals:      Patient complained of left sided chest pain. He found it difficult to describe the pain, he ended up stating it was a dull, rapid feeling pain under his ribcage. His vital signs upon assessment were 99 Pulse, 96% pulse ox, /75. This RN contacted the charge nurse and on call ANGELA Weiss. She ordred an EKG, that resulted in Sinus rythym. After reassessment of the patient, he stated the symptoms somewhat subsided, he just was unsure of what he was feeling. The new set of vital signs read as, Pulse 89, 95% pulse ox, /72. I advised on call ANGELA Weiss of the results.

## 2025-03-20 ENCOUNTER — PHARMACY VISIT (OUTPATIENT)
Dept: PHARMACY | Facility: MEDICAL CENTER | Age: 75
End: 2025-03-20
Payer: COMMERCIAL

## 2025-03-20 VITALS
RESPIRATION RATE: 18 BRPM | TEMPERATURE: 97.1 F | DIASTOLIC BLOOD PRESSURE: 62 MMHG | WEIGHT: 158.07 LBS | OXYGEN SATURATION: 95 % | BODY MASS INDEX: 23.96 KG/M2 | HEIGHT: 68 IN | HEART RATE: 87 BPM | SYSTOLIC BLOOD PRESSURE: 111 MMHG

## 2025-03-20 DIAGNOSIS — C85.90 LYMPHOMA, UNSPECIFIED BODY REGION, UNSPECIFIED LYMPHOMA TYPE (HCC): ICD-10-CM

## 2025-03-20 PROBLEM — K92.2 GI BLEED: Status: RESOLVED | Noted: 2025-03-16 | Resolved: 2025-03-20

## 2025-03-20 LAB
HCT VFR BLD AUTO: 25.2 % (ref 42–52)
HGB BLD-MCNC: 8 G/DL (ref 14–18)

## 2025-03-20 PROCEDURE — 700102 HCHG RX REV CODE 250 W/ 637 OVERRIDE(OP): Performed by: HOSPITALIST

## 2025-03-20 PROCEDURE — 85018 HEMOGLOBIN: CPT

## 2025-03-20 PROCEDURE — A9270 NON-COVERED ITEM OR SERVICE: HCPCS | Performed by: HOSPITALIST

## 2025-03-20 PROCEDURE — 85014 HEMATOCRIT: CPT

## 2025-03-20 PROCEDURE — 700102 HCHG RX REV CODE 250 W/ 637 OVERRIDE(OP)

## 2025-03-20 PROCEDURE — A9270 NON-COVERED ITEM OR SERVICE: HCPCS

## 2025-03-20 PROCEDURE — 97535 SELF CARE MNGMENT TRAINING: CPT

## 2025-03-20 PROCEDURE — RXMED WILLOW AMBULATORY MEDICATION CHARGE: Performed by: HOSPITALIST

## 2025-03-20 PROCEDURE — 99239 HOSP IP/OBS DSCHRG MGMT >30: CPT | Performed by: HOSPITALIST

## 2025-03-20 RX ORDER — OMEPRAZOLE 40 MG/1
40 CAPSULE, DELAYED RELEASE ORAL 2 TIMES DAILY
Qty: 120 CAPSULE | Refills: 1 | Status: SHIPPED | OUTPATIENT
Start: 2025-03-20 | End: 2025-07-18

## 2025-03-20 RX ORDER — TAMSULOSIN HYDROCHLORIDE 0.4 MG/1
0.8 CAPSULE ORAL DAILY
Qty: 30 CAPSULE | Refills: 0 | Status: SHIPPED | OUTPATIENT
Start: 2025-03-20

## 2025-03-20 RX ADMIN — ATORVASTATIN CALCIUM 10 MG: 10 TABLET, FILM COATED ORAL at 04:11

## 2025-03-20 RX ADMIN — TAMSULOSIN HYDROCHLORIDE 0.8 MG: 0.4 CAPSULE ORAL at 04:10

## 2025-03-20 RX ADMIN — ACETAMINOPHEN 650 MG: 325 TABLET, FILM COATED ORAL at 10:44

## 2025-03-20 RX ADMIN — ACETAMINOPHEN 650 MG: 325 TABLET, FILM COATED ORAL at 04:11

## 2025-03-20 RX ADMIN — OMEPRAZOLE 40 MG: 20 CAPSULE, DELAYED RELEASE ORAL at 04:11

## 2025-03-20 ASSESSMENT — PAIN DESCRIPTION - PAIN TYPE
TYPE: ACUTE PAIN
TYPE: ACUTE PAIN

## 2025-03-20 NOTE — THERAPY
"Physical Therapy   Discharge     Patient Name: Arya Gamez  Age:  75 y.o., Sex:  male  Medical Record #: [id number]  Today's Date: 3/20/2025     Precautions  Precautions: Fall Risk  Comments: recent R TKA    Assessment    Pt progressing with mobility as expected. Per pt, RN, and chart pt has been ambulating the halls with FWW and SPV daily. Discussed OP PT, reviewed therex and pt to continue performing. Pt stating he is still utilizing the FWW in house and would feel comfortable continuing to use one at home, however, he does not have one any longer as his previous one was loaned to him. Recommend home with FWW and OP PT. Patient will not be actively followed for physical therapy services at this time, however may be seen if requested by physician for 1 more visit within 30 days to address any discharge or equipment needs.    Plan    Reason for Discharge From Therapy: Discharge Secondary to Goals Met    DC Equipment Recommendations: Front-Wheel Walker  Discharge Recommendations: Recommend outpatient physical therapy services to address higher level deficits      Subjective    \"I walked two times around the jaffe earlier, the longest I've walked yet! Now I need to rest.\" Endorsing he will take another walk with staff and perform therex     Objective     03/20/25 1105   Vitals   O2 Delivery Device None - Room Air   Pain 0 - 10 Group   Therapist Pain Assessment Nurse Notified;Post Activity Pain Same as Prior to Activity;0   Cognition    Cognition / Consciousness WDL   Level of Consciousness Alert   Comments pleasant and cooperative   Gait Analysis   Comments pt declined mobility 2/2 recently ambulating 2 full laps around hallway (>500ft) with FWW and staff. Reports also performed the standing therex during his walk at a chair   Short Term Goals    Short Term Goal # 1 Pt will complete bed mobility with supervision from a flat bed to progress function in 6 visits.   Goal Outcome # 1 Goal met  (confirmed met with " pt and RN)   Short Term Goal # 2 Pt will transfer with FWW and supervision to progress function in 6 visits.   Goal Outcome # 2 Goal met  (confirmed met with pt and RN)   Short Term Goal # 3 Pt will ambulate 150ft with FWW and supervision to progress function in 6 visits.   Goal Outcome # 3 Goal met  (confirmed met with pt and RN)   Education Group   Education Provided Role of Physical Therapist;Exercises - Seated   Role of Physical Therapist Patient Response Patient;Acceptance;Explanation;Verbal Demonstration   Exercises - Seated Patient Response Patient;Acceptance;Explanation;Demonstration;Handout;Verbal Demonstration;Action Demonstration  (reviewed all TKA therex on handout provided by previous therapist)   Physical Therapy Treatment Plan   Reason For Discharge Discharge Secondary to Goals Met   Anticipated Discharge Equipment and Recommendations   DC Equipment Recommendations Front-Wheel Walker   Discharge Recommendations Recommend outpatient physical therapy services to address higher level deficits   Interdisciplinary Plan of Care Collaboration   IDT Collaboration with  Nursing   Patient Position at End of Therapy In Bed;Bed Alarm On;Call Light within Reach;Tray Table within Reach;Phone within Reach   Collaboration Comments RN Updated   Session Information   Date / Session Number  3/20- 2: goals met, d/c needs

## 2025-03-20 NOTE — DISCHARGE PLANNING
Pt will be staying with son and will need home health in [place], [place].  [address] is:  [address] [address]  [place]  [address]      @[phone]  DPA faxed a home health referral to  [facility]  Phone:  [phone]  Fax:  [phone]    @1577  Agency/Facility Name: [facility]  Spoke To: Gabriela  Outcome: Referral received and needs to be reviewed.  Gabriela will give this DPA a call back once reviewed.    @1963  Agency/Facility Name: [facility]  Spoke To: Gabriela  Outcome: Still reviewing the referral.  Start of care will not be available until next week due to staffing.  Gabriela will give this DPA a call back.      @3090  Agency/Facility Name: [facility]  Spoke To: Renetta  Outcome: Referral is declined due to staffing.    @2466  DPA faxed a referral to the following home health in [facility]  [facility]

## 2025-03-20 NOTE — DISCHARGE PLANNING
Received Choice form at 1854  Agency/Facility Name: [facility]  Referral sent per Choice form @ 0033

## 2025-03-20 NOTE — PROGRESS NOTES
Assumed care of pt at 1900. Report received and bedside rounding completed with day RN. Pt is calm, no SOB, no acute distress noted.    POC discussed with pt, questions answered. White board updated. Call light and pt belongings within reach - safety precautions and hourly rounding in place. See flowsheets for assessment.

## 2025-03-20 NOTE — DISCHARGE PLANNING
Case Management Discharge Planning    Admission Date: 3/17/2025  GMLOS: 2.9  ALOS: 3    6-Clicks ADL Score: 24  6-Clicks Mobility Score: 20      Anticipated Discharge Dispo: Discharge Disposition: D/T to home under HHA care in anticipation of covered skilled care (06)  Discharge Contact Phone Number: [phone]    DME Needed: Yes    DME Ordered: Yes    Action(s) Taken: Spoke with Cecy, pt's daughter, and the address in [place] that pt is going to discharge to after tonight is [address], [address], [place] [address] with his grandson. Patient signed choice for [facility] in [place] as they service that address.    Escalations Completed: None    Medically Clear: Yes    Next Steps: F/U with Home health referral    Barriers to Discharge: None    Is the patient up for discharge tomorrow: No

## 2025-03-20 NOTE — CARE PLAN
The patient is Stable - Low risk of patient condition declining or worsening    Shift Goals  Clinical Goals: mobility, monitor Hb, maintain ski integrity  Patient Goals: sleep  Family Goals: CRISTINA    Progress made toward(s) clinical / shift goals:  Hb stable at 7.8.    Problem: Knowledge Deficit - Standard  Goal: Patient and family/care givers will demonstrate understanding of plan of care, disease process/condition, diagnostic tests and medications  Outcome: Progressing     Problem: Pain - Standard  Goal: Alleviation of pain or a reduction in pain to the patient’s comfort goal  Outcome: Progressing  Note: Pain managed with PRN tylenol      Problem: Skin Integrity  Goal: Skin integrity is maintained or improved  Outcome: Progressing  Note: Pt offloads sacrum, mepilex in place, surgical incision approximated      Problem: Fall Risk  Goal: Patient will remain free from falls  Outcome: Progressing  Note: Pt ambulates steady with a FWW        Patient is not progressing towards the following goals:

## 2025-03-20 NOTE — PROGRESS NOTES
I discussed with GI Dr. Coombs  Preliminary  result from biopsy showed possible lymphoma, further test are pending, I have called patient to inform him about results and the need to get an appointment with oncology, I have sent a referral to oncology, patient asked me to call his daughter which I did and I talked with daughter Cecy and explained to her regarding preliminary results and the need for oncology follow-up as outpatient, she expressed understanding and they will call to set up appointment with oncology.

## 2025-03-20 NOTE — PROGRESS NOTES
Received pt from night RN, assumed care  Pt seen sitting on edge of bed awake and alert  Axo4 on room air  Bed low and locked  Bed alarm on  Call light within reach.

## 2025-03-20 NOTE — PROGRESS NOTES
Pt was brought down to discharge lounge. Pt's IV was prior removed. Pt's son is taking pt home. Pt got meds via meds to beds. Pt did not have any questions regarding discharge. Pt states having all belongings

## 2025-03-21 ENCOUNTER — RESULTS FOLLOW-UP (OUTPATIENT)
Dept: GASTROENTEROLOGY | Facility: MEDICAL CENTER | Age: 75
End: 2025-03-21

## 2025-03-21 NOTE — DISCHARGE SUMMARY
Discharge Summary    CHIEF COMPLAINT ON ADMISSION  No chief complaint on file.      Reason for Admission  Gastroenterology (Severe upper GI *     Admission Date  3/17/2025    CODE STATUS  Full code    HPI & HOSPITAL COURSE    Please see original H&P and consult note for specific information      74yo BPHx s/p prostatectomy, chronic pain, HLD, recent TKA 2wks prior to presentation and was taking ASA for DVT prophylaxis and then started taking NSAIDs for pain.  Presented with weakness and Hgb of 5 to an outlying hospital.  Needed 4 units of PRBCs and EGD showing complex ulcer disease.  He was then sent to us due to concern that he might require IR or surgical interventions.    EGD 3/18: again seen large possible malignant ulcer, no acute signs of bleeding    Patient improved and he was transferred to medical floor where he will continue with close monitoring of his hemoglobin, today hemoglobin is stable, patient was tolerated by PT OT recommended home with home health care, patient has been able to walk with walker, walker has been provided to patient, patient denies any dizziness lightheadedness no chest pain or shortness of breath, patient is tolerating diet, patient was discharged today in stable condition patient is recommended to follow-up with GI as outpatient.  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    Therefore, he is discharged in good and stable condition to home with close outpatient  follow-up.    The patient met 2-midnight criteria for an inpatient stay at the time of discharge.    Discharge Date  3/20/2025    FOLLOW UP ITEMS POST DISCHARGE  Primary care physician  GI  Oncology    DISCHARGE DIAGNOSES  Principal Problem (Resolved):    GI bleed (POA: Yes)  Active Problems:    S/P total knee arthroplasty, right (POA: Yes)      Overview: 3/4/25, Dr. Robertson- s/p right TKA (WBAT RLE, VTE ppx: ASA 81 mg BID x 28       days)    Benign prostatic hyperplasia with urinary obstruction (POA: Yes)    Dyslipidemia (POA: Unknown)    Acute blood loss anemia (POA: Unknown)      FOLLOW UP  No future appointments.  Libertad Cyr A.P.R.NAnnabelle  [address] [place] [address]  [phone]    Follow up in 1 week(s)      [facility]  [address] [place] [address]  [phone]  Follow up  Follow up with company if they haven't contacted you to schedule a visit within a 2-3 days of discharge.    SEFERINO Johnson  [address] [place] [address]  [phone]    Schedule an appointment as soon as possible for a visit in 1 week(s)        MEDICATIONS ON DISCHARGE     Medication List        START taking these medications        Instructions   omeprazole 40 MG delayed-release capsule  Commonly known as: PriLOSEC   Take 1 Capsule by mouth 2 times a day for 120 days.  Dose: 40 mg            CONTINUE taking these medications        Instructions   acetaminophen 500 MG Tabs  Commonly known as: Tylenol   Take 500-1,000 mg by mouth every 6 hours as needed.  Dose: 500-1,000 mg     atorvastatin 10 MG Tabs  Commonly known as: Lipitor      GLUCOSAMINE PO   Take 1 Dose by mouth every day.  Dose: 1 Dose     tamsulosin 0.4 MG capsule  Commonly known as: Flomax   Take 2 Capsules by mouth every day.  Dose: 0.8 mg              Allergies  Allergies   Allergen Reactions    Penicillins      Childhood reaction       DIET  No orders of the defined types were placed in this  encounter.      ACTIVITY  As tolerated.  Weight bearing as tolerated    CONSULTATIONS  GI    PROCEDURES  EGD    LABORATORY  Lab Results   Component Value Date    SODIUM 134 (L) 03/18/2025    POTASSIUM 4.3 03/18/2025    CHLORIDE 106 03/18/2025    CO2 21 03/18/2025    GLUCOSE 97 03/18/2025    BUN 16 03/18/2025    CREATININE 0.64 03/18/2025        Lab Results   Component Value Date    WBC 8.3 03/18/2025    HEMOGLOBIN 8.0 (L) 03/20/2025    HEMATOCRIT 25.2 (L) 03/20/2025    PLATELETCT 340 03/18/2025        Total time of the discharge process exceeds 32 minutes.

## 2025-03-24 NOTE — DISCHARGE PLANNING
@5527  Agency/Facility Name: [facility]  [phone]  Spoke To: Marilyn  Outcome: Referral was not received.  Correct fax #[phone].  DPA re-faxed via comm mgt at [phone]    @1309  Agency/Facility Name: [facility]  Outcome:

## 2025-03-25 NOTE — DISCHARGE PLANNING
Informed by DPA that they have been unable to get home health for patient in [place] where he is staying since his PCP is not local. RN JOHANNA tried calling patient to inform him but no answer. Called his daughter, Cecy, and updated her that if they still wanted home health, they have to establish with a local PCP. She states that he is starting OP PT next week for his post knee procedure so if needed, she will look into establishing care there but they are okay for now. She was appreciative for the update.

## 2025-03-25 NOTE — DISCHARGE PLANNING
@0945  Agency/Facility Name: [facility] [phone]   Spoke To: Pooja  Outcome: Declined.  Must have a local PCP.

## 2025-03-28 ENCOUNTER — HOSPITAL ENCOUNTER (OUTPATIENT)
Dept: HEMATOLOGY ONCOLOGY | Facility: MEDICAL CENTER | Age: 75
End: 2025-03-28
Attending: STUDENT IN AN ORGANIZED HEALTH CARE EDUCATION/TRAINING PROGRAM
Payer: MEDICARE

## 2025-03-28 VITALS
OXYGEN SATURATION: 98 % | WEIGHT: 156 LBS | HEIGHT: 68 IN | TEMPERATURE: 98 F | SYSTOLIC BLOOD PRESSURE: 124 MMHG | DIASTOLIC BLOOD PRESSURE: 62 MMHG | HEART RATE: 101 BPM | BODY MASS INDEX: 23.64 KG/M2

## 2025-03-28 DIAGNOSIS — Z11.59 ENCOUNTER FOR SCREENING FOR OTHER VIRAL DISEASES: ICD-10-CM

## 2025-03-28 DIAGNOSIS — C85.99 GASTRIC LYMPHOMA (HCC): ICD-10-CM

## 2025-03-28 PROCEDURE — 99205 OFFICE O/P NEW HI 60 MIN: CPT | Performed by: STUDENT IN AN ORGANIZED HEALTH CARE EDUCATION/TRAINING PROGRAM

## 2025-03-28 PROCEDURE — 99212 OFFICE O/P EST SF 10 MIN: CPT | Performed by: STUDENT IN AN ORGANIZED HEALTH CARE EDUCATION/TRAINING PROGRAM

## 2025-03-28 RX ORDER — NICOTINE 14MG/24HR
PATCH, TRANSDERMAL 24 HOURS TRANSDERMAL
COMMUNITY

## 2025-03-28 RX ORDER — CYCLOBENZAPRINE HCL 10 MG
10 TABLET ORAL 3 TIMES DAILY PRN
COMMUNITY

## 2025-03-28 ASSESSMENT — ENCOUNTER SYMPTOMS
COUGH: 0
SINUS PAIN: 0
HEADACHES: 0
WEIGHT LOSS: 0
CHILLS: 0
CONSTIPATION: 0
WHEEZING: 0
INSOMNIA: 0
SHORTNESS OF BREATH: 0
VOMITING: 0
FEVER: 0
DIARRHEA: 0
ORTHOPNEA: 0
BLURRED VISION: 0
BRUISES/BLEEDS EASILY: 0
MYALGIAS: 1
BACK PAIN: 0
ABDOMINAL PAIN: 0
DIAPHORESIS: 0
PALPITATIONS: 0
DIZZINESS: 0
NAUSEA: 0
SPUTUM PRODUCTION: 0
HEARTBURN: 0
WEAKNESS: 1
BLOOD IN STOOL: 0
NERVOUS/ANXIOUS: 0
SORE THROAT: 0
TINGLING: 0
DOUBLE VISION: 0

## 2025-03-28 ASSESSMENT — PAIN SCALES - GENERAL: PAINLEVEL_OUTOF10: 6=MODERATE PAIN

## 2025-03-28 ASSESSMENT — FIBROSIS 4 INDEX: FIB4 SCORE: 1.31

## 2025-03-28 NOTE — ADDENDUM NOTE
Encounter addended by: Karsten Raza D.O. on: 3/28/2025 9:38 AM   Actions taken: Clinical Note Signed, Order list changed, Diagnosis association updated, SmartForm saved, Problem List reviewed

## 2025-03-28 NOTE — PROGRESS NOTES
Consult:  Hematology/Oncology      Referring Physician: Lio Branch MD  Primary Care:  Libertad Cyr, WINNIE.P.RVAIBHAV.    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  man who presents to the clinic for evaluation for systemic therapy for a new diagnosis of gastric B-cell lymphoma. The patient presented to the hospital as a transfer for severe anemia and gastric ulcers. He had presented to an outside hospital with weakness and falls. He had a TKA done a few weeks ago and was taking aspirin for prophylaxis as well as ibuprofen for pain. He required several transfusions and EGD revealed a large 6 cm ulcer with numerous surrounding ulcers in the stomach. Biopsy revealed a preliminary diagnosis of gastric B cell lymphoma, though further testing is pending. The patient was stabilized and discharged from the hospital and presents for evaluation.    Interval History:  Patient is here for consultation. He is doing okay. He still has a lot of joint pains and is taking Tylenol for pain. He has trouble sleeping though that is due to BPH and needing to urinate frequently. He is feeling weak still but that is mostly due to his joints. He had weight loss after the TKA but was doing okay prior to then, aside from fatigue. His daughter is with him today - she lives in [place].     Past Medical History:   Diagnosis Date    Anesthesia     Arthritis     knees, wrists    Delayed emergence from general anesthesia     Dental disorder     upper partial    Disorder of thyroid     High cholesterol     Pain     Urinary incontinence        Past Surgical History:   Procedure Laterality Date    FL UPPER GI ENDOSCOPY,DIAGNOSIS N/A 3/18/2025    Procedure: GASTROSCOPY WITH BIOPSY;  Surgeon: Gricelda Coombs M.D.;  Location: SURGERY [facility];  Service: Gastroenterology    TURP BUTTON N/A 2021    Procedure: TURP, USING BUTTON ELECTRODE-BIPOLAR.;  Surgeon: Massimo Gee M.D.;  Location: SURGERY [facility];  Service: Urology    HERNIA REPAIR      MENISCUS REPAIR Left        Social History     Socioeconomic History    Marital status: Single     Spouse name: Not on file    Number of children: Not on file    Years of education: Not on file    Highest education level: Not on file   Occupational History    Not on file   Tobacco Use    Smoking status: Former     Current packs/day: 0.00     Average packs/day: 1.5 packs/day for 10.0 years (15.0 ttl pk-yrs)     Types: Cigarettes     Start date:      Quit date:      Years since quittin.2    Smokeless tobacco: Former     Types: Snuff     Quit date:    Vaping Use    Vaping status: Never Used   Substance and Sexual Activity    Alcohol use: Not Currently    Drug use: Not Currently    Sexual activity: Not on file   Other Topics Concern    Not on file   Social History Narrative    Not on file     Social Drivers of Health     Financial Resource Strain: Not on file   Food Insecurity: No Food Insecurity (3/17/2025)    Hunger Vital Sign     Worried About Running Out of Food in the Last Year: Never true     Ran Out of Food in the Last Year: Never true   Transportation Needs: No Transportation Needs (3/17/2025)    PRAPARE - Transportation     Lack of Transportation (Medical): No     Lack of Transportation (Non-Medical): No   Physical Activity: Not on file   Stress: Not on file   Social Connections: Not on file   Intimate Partner Violence: Not At Risk (3/17/2025)    Humiliation, Afraid, Rape, and Kick questionnaire     Fear of Current or Ex-Partner: No     Emotionally Abused: No     Physically Abused: No     Sexually Abused: No   Housing Stability: Low Risk  (3/17/2025)    Housing Stability Vital Sign     Unable to Pay for Housing in the Last Year: No     Number of Times Moved in the Last  Year: 0     Homeless in the Last Year: No       History reviewed. No pertinent family history.    OB History   No obstetric history on file.       Allergies as of 03/28/2025 - Reviewed 03/28/2025   Allergen Reaction Noted    Penicillins  05/26/2021         Current Outpatient Medications:     cyclobenzaprine (FLEXERIL) 10 mg Tab, Take 10 mg by mouth 3 times a day as needed., Disp: , Rfl:     B Complex Vitamins (VITAMIN B COMPLEX PO), Take  by mouth., Disp: , Rfl:     Saccharomyces boulardii (PROBIOTIC) 250 MG Cap, Take  by mouth., Disp: , Rfl:     omeprazole (PRILOSEC) 40 MG delayed-release capsule, Take 1 Capsule by mouth 2 times a day for 120 days., Disp: 120 Capsule, Rfl: 1    acetaminophen (TYLENOL) 500 MG Tab, Take 500-1,000 mg by mouth every 6 hours as needed., Disp: , Rfl:     tamsulosin (FLOMAX) 0.4 MG capsule, Take 2 Capsules by mouth every day. (Patient not taking: Reported on 3/28/2025), Disp: 30 Capsule, Rfl: 0    atorvastatin (LIPITOR) 10 MG Tab, , Disp: , Rfl:     Glucosamine HCl (GLUCOSAMINE PO), Take 1 Dose by mouth every day. (Patient not taking: Reported on 3/28/2025), Disp: , Rfl:     Review of Systems:  Review of Systems   Constitutional:  Positive for malaise/fatigue. Negative for chills, diaphoresis, fever and weight loss.   HENT:  Negative for hearing loss, nosebleeds, sinus pain and sore throat.    Eyes:  Negative for blurred vision and double vision.   Respiratory:  Negative for cough, sputum production, shortness of breath and wheezing.    Cardiovascular:  Negative for chest pain, palpitations, orthopnea and leg swelling.   Gastrointestinal:  Negative for abdominal pain, blood in stool, constipation, diarrhea, heartburn, melena, nausea and vomiting.   Genitourinary:  Positive for frequency. Negative for dysuria, hematuria and urgency.   Musculoskeletal:  Positive for joint pain and myalgias. Negative for back pain.   Skin:  Negative for rash.   Neurological:  Positive for weakness. Negative  "for dizziness, tingling and headaches.   Endo/Heme/Allergies:  Does not bruise/bleed easily.   Psychiatric/Behavioral:  The patient is not nervous/anxious and does not have insomnia.           Physical Exam:  Vitals:    03/28/25 0807   BP: 124/62   Pulse: (!) 101   Temp: 36.7 °C (98 °F)   TempSrc: Temporal   SpO2: 98%   Weight: 70.8 kg (156 lb)   Height: 1.727 m (5' 7.99\")       DESC; KARNOFSKY SCALE WITH ECOG EQUIVALENT: 70, Cares for self; unable to carry on normal activity or to do active work (ECOG equivalent 1)    DISTRESS LEVEL: no apparent distress    Physical Exam  Vitals and nursing note reviewed.   Constitutional:       General: He is awake. He is not in acute distress.     Appearance: Normal appearance. He is normal weight. He is not ill-appearing, toxic-appearing or diaphoretic.   HENT:      Head: Normocephalic and atraumatic.      Nose: Nose normal. No congestion.      Mouth/Throat:      Pharynx: Oropharynx is clear. No oropharyngeal exudate or posterior oropharyngeal erythema.   Eyes:      General: No scleral icterus.     Extraocular Movements: Extraocular movements intact.      Conjunctiva/sclera: Conjunctivae normal.      Pupils: Pupils are equal, round, and reactive to light.   Cardiovascular:      Rate and Rhythm: Normal rate and regular rhythm.      Pulses: Normal pulses.      Heart sounds: Normal heart sounds. No murmur heard.     No friction rub. No gallop.   Pulmonary:      Effort: Pulmonary effort is normal.      Breath sounds: Normal breath sounds. No decreased air movement. No wheezing, rhonchi or rales.   Abdominal:      General: Bowel sounds are normal. There is no distension.      Tenderness: There is no abdominal tenderness.   Musculoskeletal:         General: No deformity. Normal range of motion.      Cervical back: Normal range of motion and neck supple. No tenderness.      Right lower leg: No edema.      Left lower leg: No edema.   Lymphadenopathy:      Cervical: No cervical " adenopathy.      Upper Body:      Right upper body: No axillary adenopathy.      Left upper body: No axillary adenopathy.      Lower Body: No right inguinal adenopathy. No left inguinal adenopathy.   Skin:     General: Skin is warm and dry.      Coloration: Skin is not jaundiced.      Findings: Lesion (Fatty deposit, non-tender, in the R gluteal region with overlying skin breakdown (mild)) present. No erythema or rash.   Neurological:      General: No focal deficit present.      Mental Status: He is alert and oriented to person, place, and time.      Sensory: Sensation is intact.      Motor: Weakness present.      Gait: Gait abnormal (Ambulating with a walker).   Psychiatric:         Attention and Perception: Attention normal.         Mood and Affect: Mood normal.         Behavior: Behavior normal. Behavior is cooperative.         Thought Content: Thought content normal.         Judgment: Judgment normal.          Depression Screening    Little interest or pleasure in doing things?      Feeling down, depressed , or hopeless?     Trouble falling or staying asleep, or sleeping too much?      Feeling tired or having little energy?      Poor appetite or overeating?      Feeling bad about yourself - or that you are a failure or have let yourself or your family down?     Trouble concentrating on things, such as reading the newspaper or watching television?     Moving or speaking so slowly that other people could have noticed.  Or the opposite - being so fidgety or restless that you have been moving around a lot more than usual?      Thoughts that you would be better off dead, or of hurting yourself?      Patient Health Questionnaire Score:         If depressive symptoms identified deferred to follow up visit unless specifically addressed in assesment and plan.    Interpretation of PHQ-9 Total Score   Score Severity   1-4 No Depression   5-9 Mild Depression   10-14 Moderate Depression   15-19 Moderately Severe Depression    20-27 Severe Depression    Labs:  No visits with results within 7 Day(s) from this visit.   Latest known visit with results is:   Admission on 03/17/2025, Discharged on 03/20/2025   Component Date Value Ref Range Status    WBC 03/17/2025 12.8 (H)  4.8 - 10.8 K/uL Final    RBC 03/17/2025 2.43 (L)  4.70 - 6.10 M/uL Final    Hemoglobin 03/17/2025 7.2 (L)  14.0 - 18.0 g/dL Final    Hematocrit 03/17/2025 22.9 (L)  42.0 - 52.0 % Final    MCV 03/17/2025 94.2  81.4 - 97.8 fL Final    MCH 03/17/2025 29.6  27.0 - 33.0 pg Final    MCHC 03/17/2025 31.4 (L)  32.3 - 36.5 g/dL Final    RDW 03/17/2025 50.6 (H)  35.9 - 50.0 fL Final    Platelet Count 03/17/2025 340  164 - 446 K/uL Final    MPV 03/17/2025 8.9 (L)  9.0 - 12.9 fL Final    Neutrophils-Polys 03/17/2025 78.90 (H)  44.00 - 72.00 % Final    Lymphocytes 03/17/2025 9.30 (L)  22.00 - 41.00 % Final    Monocytes 03/17/2025 5.40  0.00 - 13.40 % Final    Eosinophils 03/17/2025 5.70  0.00 - 6.90 % Final    Basophils 03/17/2025 0.30  0.00 - 1.80 % Final    Immature Granulocytes 03/17/2025 0.40  0.00 - 0.90 % Final    Nucleated RBC 03/17/2025 0.00  0.00 - 0.20 /100 WBC Final    Neutrophils (Absolute) 03/17/2025 10.06 (H)  1.82 - 7.42 K/uL Final    Includes immature neutrophils, if present.    Lymphs (Absolute) 03/17/2025 1.19  1.00 - 4.80 K/uL Final    Monos (Absolute) 03/17/2025 0.69  0.00 - 0.85 K/uL Final    Eos (Absolute) 03/17/2025 0.73 (H)  0.00 - 0.51 K/uL Final    Baso (Absolute) 03/17/2025 0.04  0.00 - 0.12 K/uL Final    Immature Granulocytes (abs) 03/17/2025 0.05  0.00 - 0.11 K/uL Final    NRBC (Absolute) 03/17/2025 0.00  K/uL Final    Sodium 03/17/2025 135  135 - 145 mmol/L Final    Potassium 03/17/2025 4.2  3.6 - 5.5 mmol/L Final    Chloride 03/17/2025 108  96 - 112 mmol/L Final    Co2 03/17/2025 20  20 - 33 mmol/L Final    Anion Gap 03/17/2025 7.0  7.0 - 16.0 Final    Glucose 03/17/2025 85  65 - 99 mg/dL Final    Bun 03/17/2025 29 (H)  8 - 22 mg/dL Final    Creatinine  03/17/2025 0.72  0.50 - 1.40 mg/dL Final    Calcium 03/17/2025 8.6  8.5 - 10.5 mg/dL Final    Correct Calcium 03/17/2025 9.9  8.5 - 10.5 mg/dL Final    AST(SGOT) 03/17/2025 22  12 - 45 U/L Final    ALT(SGPT) 03/17/2025 13  2 - 50 U/L Final    Alkaline Phosphatase 03/17/2025 49  30 - 99 U/L Final    Total Bilirubin 03/17/2025 0.7  0.1 - 1.5 mg/dL Final    Albumin 03/17/2025 2.4 (L)  3.2 - 4.9 g/dL Final    Total Protein 03/17/2025 4.4 (L)  6.0 - 8.2 g/dL Final    Globulin 03/17/2025 2.0  1.9 - 3.5 g/dL Final    A-G Ratio 03/17/2025 1.2  g/dL Final    GFR (CKD-EPI) 03/17/2025 94  >60 mL/min/1.73 m 2 Final    Comment: Estimated Glomerular Filtration Rate is calculated using  race neutral CKD-EPI 2021 equation per NKF-ASN recommendations.      Hemoglobin 03/17/2025 7.2 (L)  14.0 - 18.0 g/dL Final    Hematocrit 03/17/2025 21.7 (L)  42.0 - 52.0 % Final    ABO Rh Confirm 03/17/2025 A POS   Final    Comment: @03/17/25 12:37 by GRAKY:  .      ABO Grouping Only 03/17/2025 A   Corrected    Comment: @03/17/25 12:41 by GRAKY:  .      Rh Grouping Only 03/17/2025 POS   Final    Antibody Screen-Cod 03/17/2025 NEG   Final    Component R 03/17/2025    Final                    Value:R7                  Red Blood Cells7    [id number]   transfused   03/17/25   13:38      Product Type 03/17/2025 Red Blood Cells LR Pheresis   Final    Dispense Status 03/17/2025 transfused   Final    Unit Number (Barcoded) 03/17/2025 [id number]   Final    Product Code (Barcoded) 03/17/2025 [id number]   Final    Blood Type (Barcoded) 03/17/2025 9500   Final    Hemoglobin 03/17/2025 9.1 (L)  14.0 - 18.0 g/dL Final    Results confirmed by repeat testing.    Hematocrit 03/17/2025 28.5 (L)  42.0 - 52.0 % Final    WBC 03/18/2025 8.3  4.8 - 10.8 K/uL Final    RBC 03/18/2025 2.62 (L)  4.70 - 6.10 M/uL Final    Hemoglobin 03/18/2025 7.8 (L)  14.0 - 18.0 g/dL Final    Hematocrit 03/18/2025 24.3 (L)  42.0 - 52.0 % Final    MCV 03/18/2025 92.7  81.4 - 97.8 fL  Final    MCH 03/18/2025 29.8  27.0 - 33.0 pg Final    MCHC 03/18/2025 32.1 (L)  32.3 - 36.5 g/dL Final    RDW 03/18/2025 50.3 (H)  35.9 - 50.0 fL Final    Platelet Count 03/18/2025 340  164 - 446 K/uL Final    MPV 03/18/2025 8.8 (L)  9.0 - 12.9 fL Final    Sodium 03/18/2025 134 (L)  135 - 145 mmol/L Final    Potassium 03/18/2025 4.3  3.6 - 5.5 mmol/L Final    Chloride 03/18/2025 106  96 - 112 mmol/L Final    Co2 03/18/2025 21  20 - 33 mmol/L Final    Anion Gap 03/18/2025 7.0  7.0 - 16.0 Final    Glucose 03/18/2025 97  65 - 99 mg/dL Final    Bun 03/18/2025 16  8 - 22 mg/dL Final    Creatinine 03/18/2025 0.64  0.50 - 1.40 mg/dL Final    Calcium 03/18/2025 8.9  8.5 - 10.5 mg/dL Final    Correct Calcium 03/18/2025 10.1  8.5 - 10.5 mg/dL Final    AST(SGOT) 03/18/2025 20  12 - 45 U/L Final    ALT(SGPT) 03/18/2025 12  2 - 50 U/L Final    Alkaline Phosphatase 03/18/2025 52  30 - 99 U/L Final    Total Bilirubin 03/18/2025 0.9  0.1 - 1.5 mg/dL Final    Albumin 03/18/2025 2.5 (L)  3.2 - 4.9 g/dL Final    Total Protein 03/18/2025 4.5 (L)  6.0 - 8.2 g/dL Final    Globulin 03/18/2025 2.0  1.9 - 3.5 g/dL Final    A-G Ratio 03/18/2025 1.3  g/dL Final    Magnesium 03/18/2025 1.7  1.5 - 2.5 mg/dL Final    Phosphorus 03/18/2025 2.5  2.5 - 4.5 mg/dL Final    GFR (CKD-EPI) 03/18/2025 97  >60 mL/min/1.73 m 2 Final    Comment: Estimated Glomerular Filtration Rate is calculated using  race neutral CKD-EPI 2021 equation per NKF-ASN recommendations.      Hemoglobin 03/18/2025 8.1 (L)  14.0 - 18.0 g/dL Final    Hematocrit 03/18/2025 25.4 (L)  42.0 - 52.0 % Final    Pathology Request 03/18/2025 Sent to Histo   Final    Hemoglobin 03/18/2025 8.5 (L)  14.0 - 18.0 g/dL Final    Hematocrit 03/18/2025 26.0 (L)  42.0 - 52.0 % Final    Report 03/19/2025    Final                    Value:[facility]    Test Date:  2025-03-19  Pt Name:    TERRI BEAVER            Department: 61  MRN:        [id number]                      Room:        S624  Gender:     Male                         Technician: AYDEE  :        1950                   Requested By:ALBERTINA GUERRA  Order #:    [id number]                    Reading MD: Laurie Montoya MD    Measurements  Intervals                                Axis  Rate:       87                           P:          29  OR:         247                          QRS:        -9  QRSD:       86                           T:          39  QT:         339  QTc:        408    Interpretive Statements  Sinus rhythm  Prolonged OR interval  Electronically Signed On 2025 07:08:27 PDT by Laurie Montoya MD      Hemoglobin 2025 7.6 (L)  14.0 - 18.0 g/dL Final    Hematocrit 2025 23.1 (L)  42.0 - 52.0 % Final    Hemoglobin 2025 7.8 (L)  14.0 - 18.0 g/dL Final    Hematocrit 2025 23.9 (L)  42.0 - 52.0 % Final    Magnesium 2025 1.6  1.5 - 2.5 mg/dL Final    Hemoglobin 2025 8.0 (L)  14.0 - 18.0 g/dL Final    Hematocrit 2025 25.2 (L)  42.0 - 52.0 % Final       Imaging:   All listed images below have been independently reviewed by me. I agree with the findings as summarized below:    DX-KNEE 2-  Result Date: 3/4/2025  DIAGNOSTIC IMAGING RESULTS EXAM: XR KNEE 1 OR 2 VW RIGHT DATE OF EXAM: 3/4/2025 11:39 ORDERING PHYSICIAN: JB HUYNH COMPARISON: None REASON FOR EXAM: Total Knee Replacement Z01.818-Pre-op exam ;  M17.11-Arthritis of right knee ;   M17.11-Primary osteoarthritis of right knee ;  To be done in PACU. FINDINGS:   Immediate postoperative appearance following right total knee arthroplasty without evidence of complication.       1. Right total knee arthroplasty. Thank you for referring TERRI BEAVER, : 1950, MRN: [id number], Accession #: [id number]  Electronically signed by Jim Robles on 3/4/2025 11:52       Pathology:      Assessment & Plan:  1. Gastric lymphoma (HCC)  CBC WITH DIFFERENTIAL    Comp Metabolic Panel    LDH    URIC ACID    PHOSPHORUS     HEP B SURFACE AB    HEP B CORE AB IGM    HEP B SURFACE ANTIGEN    [id number] BASE TO MID-THIGH      2. Encounter for screening for other viral diseases  HEP B SURFACE AB          This is a 75 year old  man with gastric B-cell lymphoma, NOS, pending additional pathology testing. He presents for evaluation.     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 Will get PET scan and labs to assess disease further.    Treatment Citation: [facility]    Plan of Care:    Primary Therapy: R-miniCHOP to be started in next few weeks.   Supportive Therapy: Medical management per primary  Toxicity: Patient is getting antineoplastic therapy and needs monitoring of blood counts, hepatic function, and renal function due to potential for organ dysfunction.   Labs: CBC with diff, CMP, Uric acid, LDH, Phos, Hep B serologies  Imaging: PET scan requested  Treatment Planning: Patient has marginal zone lymphoma with areas of transformation to DLBCL. Therefore, will treat with R-miniCHOP. PET scan for staging to determine duration of treatment.   Consultations: GI (Dr. Coombs)  Code Status: Full  Miscellaneous: NA  Return for Follow Up: After scans    Any questions and concerns raised by the patient were answered to the best of my ability. Thank you for allowing me to participate in the care for this patient. Please feel free to contact me for any questions or concerns.     Total time spent on chart review, clinic encounter, and documentation: 63 minutes.

## 2025-03-31 ENCOUNTER — HOSPITAL ENCOUNTER (OUTPATIENT)
Dept: RADIOLOGY | Facility: MEDICAL CENTER | Age: 75
End: 2025-03-31
Attending: STUDENT IN AN ORGANIZED HEALTH CARE EDUCATION/TRAINING PROGRAM
Payer: MEDICARE

## 2025-03-31 ENCOUNTER — PATIENT OUTREACH (OUTPATIENT)
Dept: ONCOLOGY | Facility: MEDICAL CENTER | Age: 75
End: 2025-03-31

## 2025-03-31 ENCOUNTER — HOSPITAL ENCOUNTER (OUTPATIENT)
Dept: LAB | Facility: MEDICAL CENTER | Age: 75
End: 2025-03-31
Attending: STUDENT IN AN ORGANIZED HEALTH CARE EDUCATION/TRAINING PROGRAM
Payer: MEDICARE

## 2025-03-31 DIAGNOSIS — Z11.59 ENCOUNTER FOR SCREENING FOR OTHER VIRAL DISEASES: ICD-10-CM

## 2025-03-31 DIAGNOSIS — C85.99 GASTRIC LYMPHOMA (HCC): ICD-10-CM

## 2025-03-31 LAB
ALBUMIN SERPL BCP-MCNC: 3.3 G/DL (ref 3.2–4.9)
ALBUMIN/GLOB SERPL: 1.1 G/DL
ALP SERPL-CCNC: 91 U/L (ref 30–99)
ALT SERPL-CCNC: 14 U/L (ref 2–50)
ANION GAP SERPL CALC-SCNC: 10 MMOL/L (ref 7–16)
AST SERPL-CCNC: 19 U/L (ref 12–45)
BASOPHILS # BLD AUTO: 1.2 % (ref 0–1.8)
BASOPHILS # BLD: 0.06 K/UL (ref 0–0.12)
BILIRUB SERPL-MCNC: 0.4 MG/DL (ref 0.1–1.5)
BUN SERPL-MCNC: 19 MG/DL (ref 8–22)
CALCIUM ALBUM COR SERPL-MCNC: 10.4 MG/DL (ref 8.5–10.5)
CALCIUM SERPL-MCNC: 9.8 MG/DL (ref 8.5–10.5)
CHLORIDE SERPL-SCNC: 103 MMOL/L (ref 96–112)
CO2 SERPL-SCNC: 22 MMOL/L (ref 20–33)
CREAT SERPL-MCNC: 0.79 MG/DL (ref 0.5–1.4)
EOSINOPHIL # BLD AUTO: 0.49 K/UL (ref 0–0.51)
EOSINOPHIL NFR BLD: 9.4 % (ref 0–6.9)
ERYTHROCYTE [DISTWIDTH] IN BLOOD BY AUTOMATED COUNT: 53.9 FL (ref 35.9–50)
GFR SERPLBLD CREATININE-BSD FMLA CKD-EPI: 91 ML/MIN/1.73 M 2
GLOBULIN SER CALC-MCNC: 2.9 G/DL (ref 1.9–3.5)
GLUCOSE BLD-MCNC: 82 MG/DL (ref 65–99)
GLUCOSE SERPL-MCNC: 94 MG/DL (ref 65–99)
HBV CORE IGM SER QL: NORMAL
HBV SURFACE AB SERPL IA-ACNC: <3.5 MIU/ML (ref 0–10)
HBV SURFACE AG SER QL: NORMAL
HCT VFR BLD AUTO: 29.6 % (ref 42–52)
HGB BLD-MCNC: 9 G/DL (ref 14–18)
IMM GRANULOCYTES # BLD AUTO: 0 K/UL (ref 0–0.11)
IMM GRANULOCYTES NFR BLD AUTO: 0 % (ref 0–0.9)
LDH SERPL L TO P-CCNC: 157 U/L (ref 107–266)
LYMPHOCYTES # BLD AUTO: 0.95 K/UL (ref 1–4.8)
LYMPHOCYTES NFR BLD: 18.2 % (ref 22–41)
MCH RBC QN AUTO: 28.7 PG (ref 27–33)
MCHC RBC AUTO-ENTMCNC: 30.4 G/DL (ref 32.3–36.5)
MCV RBC AUTO: 94.3 FL (ref 81.4–97.8)
MONOCYTES # BLD AUTO: 0.77 K/UL (ref 0–0.85)
MONOCYTES NFR BLD AUTO: 14.8 % (ref 0–13.4)
NEUTROPHILS # BLD AUTO: 2.94 K/UL (ref 1.82–7.42)
NEUTROPHILS NFR BLD: 56.4 % (ref 44–72)
NRBC # BLD AUTO: 0 K/UL
NRBC BLD-RTO: 0 /100 WBC (ref 0–0.2)
PHOSPHATE SERPL-MCNC: 3.6 MG/DL (ref 2.5–4.5)
PLATELET # BLD AUTO: 395 K/UL (ref 164–446)
PMV BLD AUTO: 9.7 FL (ref 9–12.9)
POTASSIUM SERPL-SCNC: 4.5 MMOL/L (ref 3.6–5.5)
PROT SERPL-MCNC: 6.2 G/DL (ref 6–8.2)
RBC # BLD AUTO: 3.14 M/UL (ref 4.7–6.1)
SODIUM SERPL-SCNC: 135 MMOL/L (ref 135–145)
URATE SERPL-MCNC: 4.5 MG/DL (ref 2.5–8.3)
WBC # BLD AUTO: 5.2 K/UL (ref 4.8–10.8)

## 2025-03-31 PROCEDURE — 84550 ASSAY OF BLOOD/URIC ACID: CPT

## 2025-03-31 PROCEDURE — 83615 LACTATE (LD) (LDH) ENZYME: CPT

## 2025-03-31 PROCEDURE — 86706 HEP B SURFACE ANTIBODY: CPT | Mod: GA

## 2025-03-31 PROCEDURE — 86705 HEP B CORE ANTIBODY IGM: CPT

## 2025-03-31 PROCEDURE — A9552 F18 FDG: HCPCS

## 2025-03-31 PROCEDURE — 85025 COMPLETE CBC W/AUTO DIFF WBC: CPT

## 2025-03-31 PROCEDURE — 36415 COLL VENOUS BLD VENIPUNCTURE: CPT | Mod: GA

## 2025-03-31 PROCEDURE — 87340 HEPATITIS B SURFACE AG IA: CPT | Mod: GA

## 2025-03-31 PROCEDURE — 80053 COMPREHEN METABOLIC PANEL: CPT

## 2025-03-31 PROCEDURE — 84100 ASSAY OF PHOSPHORUS: CPT

## 2025-04-02 ENCOUNTER — HOSPITAL ENCOUNTER (OUTPATIENT)
Facility: MEDICAL CENTER | Age: 75
End: 2025-04-02
Attending: STUDENT IN AN ORGANIZED HEALTH CARE EDUCATION/TRAINING PROGRAM | Admitting: STUDENT IN AN ORGANIZED HEALTH CARE EDUCATION/TRAINING PROGRAM
Payer: MEDICARE

## 2025-04-02 ENCOUNTER — HOSPITAL ENCOUNTER (EMERGENCY)
Facility: MEDICAL CENTER | Age: 75
End: 2025-04-02
Payer: MEDICARE

## 2025-04-02 ENCOUNTER — APPOINTMENT (OUTPATIENT)
Dept: RADIOLOGY | Facility: MEDICAL CENTER | Age: 75
End: 2025-04-02
Attending: STUDENT IN AN ORGANIZED HEALTH CARE EDUCATION/TRAINING PROGRAM
Payer: MEDICARE

## 2025-04-02 VITALS
HEIGHT: 68 IN | TEMPERATURE: 98.5 F | SYSTOLIC BLOOD PRESSURE: 108 MMHG | OXYGEN SATURATION: 97 % | BODY MASS INDEX: 23.72 KG/M2 | HEART RATE: 92 BPM | RESPIRATION RATE: 16 BRPM | DIASTOLIC BLOOD PRESSURE: 77 MMHG

## 2025-04-02 VITALS
BODY MASS INDEX: 23.15 KG/M2 | HEART RATE: 85 BPM | HEIGHT: 68 IN | SYSTOLIC BLOOD PRESSURE: 104 MMHG | TEMPERATURE: 97.7 F | OXYGEN SATURATION: 96 % | RESPIRATION RATE: 14 BRPM | DIASTOLIC BLOOD PRESSURE: 66 MMHG | WEIGHT: 152.78 LBS

## 2025-04-02 DIAGNOSIS — C85.99 GASTRIC LYMPHOMA (HCC): ICD-10-CM

## 2025-04-02 PROCEDURE — 36500 INSERTION OF CATHETER VEIN: CPT

## 2025-04-02 PROCEDURE — 160035 HCHG PACU - 1ST 60 MINS PHASE I

## 2025-04-02 PROCEDURE — 700111 HCHG RX REV CODE 636 W/ 250 OVERRIDE (IP): Mod: JZ | Performed by: RADIOLOGY

## 2025-04-02 PROCEDURE — 700111 HCHG RX REV CODE 636 W/ 250 OVERRIDE (IP): Mod: JZ

## 2025-04-02 PROCEDURE — 700101 HCHG RX REV CODE 250

## 2025-04-02 PROCEDURE — 160015 HCHG STAT PREOP MINUTES

## 2025-04-02 PROCEDURE — 160002 HCHG RECOVERY MINUTES (STAT)

## 2025-04-02 PROCEDURE — 99152 MOD SED SAME PHYS/QHP 5/>YRS: CPT

## 2025-04-02 PROCEDURE — 160036 HCHG PACU - EA ADDL 30 MINS PHASE I

## 2025-04-02 PROCEDURE — 700105 HCHG RX REV CODE 258: Performed by: RADIOLOGY

## 2025-04-02 PROCEDURE — 302449 STATCHG TRIAGE ONLY (STATISTIC)

## 2025-04-02 RX ORDER — LIDOCAINE HYDROCHLORIDE AND EPINEPHRINE 10; 10 MG/ML; UG/ML
INJECTION, SOLUTION INFILTRATION; PERINEURAL
Status: COMPLETED
Start: 2025-04-02 | End: 2025-04-02

## 2025-04-02 RX ORDER — MIDAZOLAM HYDROCHLORIDE 1 MG/ML
INJECTION INTRAMUSCULAR; INTRAVENOUS
Status: COMPLETED
Start: 2025-04-02 | End: 2025-04-02

## 2025-04-02 RX ORDER — MIDAZOLAM HYDROCHLORIDE 1 MG/ML
.5-2 INJECTION INTRAMUSCULAR; INTRAVENOUS PRN
Status: DISCONTINUED | OUTPATIENT
Start: 2025-04-02 | End: 2025-04-02 | Stop reason: HOSPADM

## 2025-04-02 RX ORDER — ONDANSETRON 2 MG/ML
4 INJECTION INTRAMUSCULAR; INTRAVENOUS EVERY 8 HOURS PRN
Status: DISCONTINUED | OUTPATIENT
Start: 2025-04-02 | End: 2025-04-02 | Stop reason: HOSPADM

## 2025-04-02 RX ORDER — CEFAZOLIN SODIUM 1 G/3ML
INJECTION, POWDER, FOR SOLUTION INTRAMUSCULAR; INTRAVENOUS
Status: COMPLETED
Start: 2025-04-02 | End: 2025-04-02

## 2025-04-02 RX ORDER — OXYCODONE HYDROCHLORIDE 10 MG/1
10 TABLET ORAL
Status: DISCONTINUED | OUTPATIENT
Start: 2025-04-02 | End: 2025-04-02 | Stop reason: HOSPADM

## 2025-04-02 RX ORDER — OXYCODONE HYDROCHLORIDE 5 MG/1
5 TABLET ORAL
Status: DISCONTINUED | OUTPATIENT
Start: 2025-04-02 | End: 2025-04-02 | Stop reason: HOSPADM

## 2025-04-02 RX ORDER — SODIUM CHLORIDE 9 MG/ML
500 INJECTION, SOLUTION INTRAVENOUS
Status: DISCONTINUED | OUTPATIENT
Start: 2025-04-02 | End: 2025-04-02 | Stop reason: HOSPADM

## 2025-04-02 RX ADMIN — FENTANYL CITRATE 50 MCG: 50 INJECTION, SOLUTION INTRAMUSCULAR; INTRAVENOUS at 10:56

## 2025-04-02 RX ADMIN — MIDAZOLAM HYDROCHLORIDE 1 MG: 1 INJECTION, SOLUTION INTRAMUSCULAR; INTRAVENOUS at 10:56

## 2025-04-02 RX ADMIN — FENTANYL CITRATE 50 MCG: 50 INJECTION, SOLUTION INTRAMUSCULAR; INTRAVENOUS at 11:03

## 2025-04-02 RX ADMIN — CEFAZOLIN 2 G: 1 INJECTION, POWDER, FOR SOLUTION INTRAMUSCULAR; INTRAVENOUS at 10:42

## 2025-04-02 RX ADMIN — MIDAZOLAM HYDROCHLORIDE 1 MG: 1 INJECTION, SOLUTION INTRAMUSCULAR; INTRAVENOUS at 10:59

## 2025-04-02 RX ADMIN — LIDOCAINE HYDROCHLORIDE AND EPINEPHRINE: 10; 10 INJECTION, SOLUTION INFILTRATION; PERINEURAL at 10:59

## 2025-04-02 ASSESSMENT — FIBROSIS 4 INDEX: FIB4 SCORE: 0.99

## 2025-04-02 NOTE — PROGRESS NOTES
1128 - patient arrived to pacu.  2 identifiers verified, attached to monitors, alarms/parameters verified, and received report.  Right upper chest site assessed with Marissa LONG RN.  Site is clean, dry, and soft with tegaderm and gauze dressing in place.  Oriented to unit and plan of care discussed.       1140 - spoke with patient's daughter on phone with updates.  Will plan to meet daughter outside with patient when ready for DC and to review instructions.  Patient has no needs.     1220 - patient ambulated to restroom to void.  Changed into personal clothing without assistance.    1235 - patient discharged via wheelchair.  DC instructions reviewed with patient and their daughter, all questions answered.  Device card/information with patient.    Initial Treatment Date: 07-  Occupation: Retired  Referred by: Slava Thomas MD  Primary Care Physician: Luba Patel APNP  Date informed consent signed: 07-  Visit Number of Current Episode: 7  Length of Visit: 15 min  Room: F/D     Subjective:   Fransico is a 64 year old male, retired, who presents today for continued treatment of neck and low back.  He denies any new injuries or traumas just idiopathic increased pain of previously diagnosed complaints that responded well to conservative care.  The neck and upper back pain is less frequent, mild, dull and achy.  The mid and lower back pain is frequent, mild, dull and achy with less nerve complaints radiating into the left groin but does hurt on the left side more so in the lower back today.  Mild fluctuating discomfort in all areas but overall improving significantly.  Current pain level 1/10 in all areas but can fluctuate between 0/10 at best but no higher than 4/10 at worst using the visual pain assessment tool.    Objective findings   Mild palpatory tenderness is noted over the cervical, thoracic, lumbar spine and bilateral sacroiliac joints at the PSIS level.  Mild to moderate joint restrictions are noted at the following levels: C0-C2, C4-T5, T9-L2, L5-S1 and at the bilateral sacroiliac joints. Muscle hypertonicity is graded at 2-3 or mild to moderate and is contributing to restricted mobility in the involved areas. These areas include the hamstrings, anterior pelvic and gluteal musculature, cervical, thoracic and lumbar paraspinals as well as the left ITB in TFL.  Neck disability index 12% is mild and the Oswestry low back pain questionnaire graded at 12% also is also considered a mild category rating.    No relevant x-rays, CTs or MRI s in the file to review.    Assessment:  Segmental and somatic dysfunction lumbar, cervical, thoracic and sacral regions with cervical facet pain.  Patient now has mild pain disability questionnaire  rating in all areas and pain level can get as low as 0/10 but frequently fluctuates between 1-4/10 in all areas and improving but not resolved.  Recommend a couple more treatments over the next couple weeks looking for maximum improvement point.    Complicating Factors/Co-morbidities:   BMI over 44, lack of regular rehabilitative stretching or exercise and chronic nature of complaints    Working diagnoses:     1. Segmental and somatic dysfunction of lumbar region    2. Segmental and somatic dysfunction of cervical region    3. Segmental and somatic dysfunction of thoracic region    4. Segmental and somatic dysfunction of sacral region    5. Pain of cervical facet joint      Treatment today:   All joint restrictions in the cervical, thoracic, lumbar and bilateral sacroiliac joints were treated today and the exact levels are listed in the objective section. These areas were adjusted today utilizing a gentle diversified technique to correct aberrant joint function and reduce scar tissue formation. [place] flexion distraction technique was utilized to decompress lumbar discs and to improve range of motion for 4 minutes. This procedure was done without incident at the site(s) of restriction.    Unattended interferential current was applied to the cervical, thoracic, lumbar and sacral sites as noted in the objective section for 15 minutes at an intensity level that was comfortable for the patient. This modality was performed to reduce pain and gently decongest tissues to help the patient's condition heal. Patient tolerated the procedure well.    *Patient has an up-to-date, signed, ABN form that is on the file as of visit date April 16, 2024 and is valid through April 16, 2025. Patient understands the interferential current, ultrasound therapy, elective maintenance treatment and therapeutic exercise treatment is a non-covered service through Medicare and agrees to be financially responsible for this service.*    Plan:  Patient  was advised to return if condition regresses or exacerbates or with any new injuries or trauma.  Patient will be following up in about 6 weeks or 2 months as he does have fluctuating pain level and will likely benefit from periodic care as his condition does fluctuate mildly and is responding well to conservative care without the need for referrals for pain management or more invasive treatments at this point.      Patient Instruction/Education:   Patient advised to utilize ice or cold compresses over the involved regions at home to help reduce pain and inflammation as needed. Patient stated understanding of, and was in agreement with, the discussed instructions.    Other treatment options discussed with patient:  Further recommendations will be guided, in part, by the outcome of care. No further recommendations or referrals will be needed unless patient fails to adequately respond to conservative care.

## 2025-04-02 NOTE — OR SURGEON
Immediate Post- Operative Note        Findings: Needs Chemo      Procedure(s): CHUY Lin      Estimated Blood Loss: Less than 5 ml        Complications: None            4/2/2025     11:10 AM     Kleber Rodriguez M.D.

## 2025-04-02 NOTE — DISCHARGE INSTRUCTIONS
Care After Implanted Port Insertion     The following information offers guidance on how to care for yourself after your procedure. If you have problems or questions, contact your health care provider.     What can I expect after the procedure?     After the procedure, it is common to have:   Discomfort at the port insertion site.   Bruising on the skin over the port. This should improve over 3-4 days.     Follow these instructions at home:     Port care   After your port is placed, you will get a 's information card. The card has information about your port. Keep this card with you at all times.   Take care of the port as told by your health care provider. Ask your health care provider if you or a family member can get training for taking care of the port at home.   Make sure to remember what type of port you have.     Incision care   Wash your hands with soap and water for at least 20 seconds before and after you change your bandage (dressing). If soap and water are not available, use hand .   Leave the initial dressing on for 48-72 hours.   Do not shower while the bandage is in place, only take a sponge bath. If the dressing gets wet or soiled remove it and replace as needed. Once the bandage is removed you may shower, but do not scrub the incision site until it is fully healed. Let warm soapy water run over the site then pat dry.    Leave stitches (sutures), skin glue, or adhesive strips in place. These skin closures may need to stay in place for 2 weeks or longer. If adhesive strip edges start to loosen and curl up, you may trim the loose edges. Do not remove adhesive strips completely unless your health care provider tells you to do that.     Check your port insertion site every day for signs of infection. Check for:   Redness, swelling, or pain.   Fluid or blood.   Warmth.   Pus or a bad smell.     Activity   Return to your normal activities as told by your health care provider. Ask your  health care provider what activities are safe for you.   Avoid lifting anything over 10lbs, any vigorous use of your arms, and any exercises that require your arms above your shoulders or behind your back for 1 week.      General instructions   Take over-the-counter and prescription medicines only as told by your health care provider.   Do not take baths, swim, or use a hot tub until the site is fully healed . You may take a shower once the dressing is removed in 48-72 hours.   Wear a medical alert bracelet in case of an emergency. This will tell any health care providers that you have a port.   Keep all follow-up visits. This is important.     Contact a health care provider if:   You cannot flush your port with saline as directed, or you cannot draw blood from the port.   You have a fever or chills.   You have redness, swelling, or pain around your port insertion site.   You have fluid or blood coming from your port insertion site.   Your port insertion site feels warm to the touch.   You have pus or a bad smell coming from the port insertion site.     Get help right away if:   You have chest pain or shortness of breath.   You have bleeding from your port that you cannot control.   These symptoms may be an emergency. Get help right away. Call 911.

## 2025-04-02 NOTE — PROGRESS NOTES
Pt presents to IR 1. Patient was consented by MD at bedside, confirmed by this RN and consent at bedside. Pt transferred to IR 1 table in Supine position. Patient underwent a Tunneled port Placement by Dr. Rodriguez. Procedure site was marked by MD and verified using imaging guidance. Pt placed on monitor, prepped and draped in a sterile fashion. Vitals were taken every 5 minutes and remained stable during procedure (see doc flow sheet for results). CO2 waveform capnography was monitored and remained WNL throughout procedure. Report called to [facility] RN. Pt transported by stretcher with RN to PPU.         BARD  Power Port 8F x 20cm  REF: [id number]  LOT: [id number]  EXP: 2026-07-31

## 2025-04-03 ENCOUNTER — PATIENT OUTREACH (OUTPATIENT)
Dept: ONCOLOGY | Facility: MEDICAL CENTER | Age: 75
End: 2025-04-03
Payer: MEDICARE

## 2025-04-03 ENCOUNTER — PHARMACY VISIT (OUTPATIENT)
Dept: PHARMACY | Facility: MEDICAL CENTER | Age: 75
End: 2025-04-03
Payer: MEDICARE

## 2025-04-03 ENCOUNTER — HOSPITAL ENCOUNTER (OUTPATIENT)
Dept: HEMATOLOGY ONCOLOGY | Facility: MEDICAL CENTER | Age: 75
End: 2025-04-03
Attending: STUDENT IN AN ORGANIZED HEALTH CARE EDUCATION/TRAINING PROGRAM
Payer: MEDICARE

## 2025-04-03 VITALS
DIASTOLIC BLOOD PRESSURE: 62 MMHG | HEIGHT: 68 IN | HEART RATE: 95 BPM | OXYGEN SATURATION: 100 % | BODY MASS INDEX: 23.4 KG/M2 | SYSTOLIC BLOOD PRESSURE: 118 MMHG | WEIGHT: 154.4 LBS | TEMPERATURE: 97.1 F

## 2025-04-03 DIAGNOSIS — L72.3 SEBACEOUS CYST: ICD-10-CM

## 2025-04-03 DIAGNOSIS — C85.99 GASTRIC LYMPHOMA (HCC): ICD-10-CM

## 2025-04-03 DIAGNOSIS — Z79.899 HIGH RISK MEDICATION USE: ICD-10-CM

## 2025-04-03 DIAGNOSIS — C96.A HISTIOCYTIC SARCOMA (HCC): ICD-10-CM

## 2025-04-03 PROCEDURE — 99214 OFFICE O/P EST MOD 30 MIN: CPT | Performed by: STUDENT IN AN ORGANIZED HEALTH CARE EDUCATION/TRAINING PROGRAM

## 2025-04-03 PROCEDURE — 99214 OFFICE O/P EST MOD 30 MIN: CPT

## 2025-04-03 PROCEDURE — RXOTC WILLOW AMBULATORY OTC CHARGE

## 2025-04-03 PROCEDURE — RXMED WILLOW AMBULATORY MEDICATION CHARGE: Performed by: STUDENT IN AN ORGANIZED HEALTH CARE EDUCATION/TRAINING PROGRAM

## 2025-04-03 RX ORDER — PROCHLORPERAZINE MALEATE 10 MG
10 TABLET ORAL EVERY 6 HOURS PRN
Qty: 30 TABLET | Refills: 3 | Status: SHIPPED | OUTPATIENT
Start: 2025-04-03

## 2025-04-03 RX ORDER — OLANZAPINE 5 MG/1
TABLET, FILM COATED ORAL
Qty: 4 TABLET | Refills: 3 | Status: SHIPPED | OUTPATIENT
Start: 2025-04-03

## 2025-04-03 RX ORDER — LIDOCAINE AND PRILOCAINE 25; 25 MG/G; MG/G
CREAM TOPICAL
Qty: 30 G | Refills: 3 | Status: SHIPPED | OUTPATIENT
Start: 2025-04-03

## 2025-04-03 RX ORDER — ONDANSETRON 4 MG/1
4 TABLET, FILM COATED ORAL EVERY 4 HOURS PRN
Qty: 20 TABLET | Refills: 3 | Status: SHIPPED | OUTPATIENT
Start: 2025-04-03

## 2025-04-03 ASSESSMENT — ENCOUNTER SYMPTOMS
NERVOUS/ANXIOUS: 0
SHORTNESS OF BREATH: 0
DIARRHEA: 0
BLURRED VISION: 0
HEARTBURN: 0
INSOMNIA: 0
CHILLS: 0
NAUSEA: 0
BLOOD IN STOOL: 0
DIAPHORESIS: 0
ABDOMINAL PAIN: 0
WHEEZING: 0
SORE THROAT: 0
DOUBLE VISION: 0
SINUS PAIN: 0
DIZZINESS: 0
BACK PAIN: 0
HEADACHES: 0
TINGLING: 0
WEIGHT LOSS: 0
SPUTUM PRODUCTION: 0
VOMITING: 0
FEVER: 0
BRUISES/BLEEDS EASILY: 0
MYALGIAS: 0
PALPITATIONS: 0
WEAKNESS: 1
CONSTIPATION: 0
ORTHOPNEA: 0
COUGH: 0

## 2025-04-03 ASSESSMENT — FIBROSIS 4 INDEX: FIB4 SCORE: 0.99

## 2025-04-03 ASSESSMENT — PAIN SCALES - GENERAL: PAINLEVEL_OUTOF10: 6=MODERATE PAIN

## 2025-04-03 NOTE — PROGRESS NOTES
Outbound call to patient for [facility] intake, call was going in and out. Will attempt to reach patient tomorrow and offer intake.

## 2025-04-03 NOTE — PROGRESS NOTES
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  man who presents to the clinic for evaluation for systemic therapy for a new diagnosis of gastric B-cell lymphoma. The patient presented to the hospital as a transfer for severe anemia and gastric ulcers. He had presented to an outside hospital with weakness and falls. He had a TKA done a few weeks ago and was taking aspirin for prophylaxis as well as ibuprofen for pain. He required several transfusions and EGD revealed a large 6 cm ulcer with numerous surrounding ulcers in the stomach. Biopsy revealed a preliminary diagnosis of gastric B cell lymphoma, though further testing is pending. The patient was stabilized and discharged from the hospital and presents for evaluation.     Treatment History: NA    Interval History:  Patient is here for follow up visit. He is having discomfort in his right knee and his gluteal region due to the surgery and the sebaceous cyst on his gluteal region, respectively. He feels tired, but otherwise is okay.     Allergies as of 04/03/2025 - Reviewed 04/03/2025   Allergen Reaction Noted    Penicillins  05/26/2021         Current Outpatient Medications:     B Complex Vitamins (VITAMIN B COMPLEX PO), Take  by mouth., Disp: , Rfl:     Saccharomyces boulardii (PROBIOTIC) 250 MG Cap, Take  by mouth., Disp: , Rfl:     tamsulosin (FLOMAX) 0.4 MG capsule, Take 2 Capsules by mouth every day., Disp: 30 Capsule, Rfl: 0    omeprazole (PRILOSEC) 40 MG delayed-release capsule, Take 1 Capsule by mouth 2 times a day for 120 days., Disp: 120 Capsule, Rfl: 1    acetaminophen (TYLENOL) 500 MG Tab,  Take 500-1,000 mg by mouth every 6 hours as needed., Disp: , Rfl:     ondansetron (ZOFRAN) 4 MG Tab tablet, Take 1 Tablet by mouth every four hours as needed for Nausea/Vomiting., Disp: 20 Tablet, Rfl: 3    prochlorperazine (COMPAZINE) 10 MG Tab, Take 1 Tablet by mouth every 6 hours as needed for Nausea/Vomiting., Disp: 30 Tablet, Rfl: 3    lidocaine-prilocaine (EMLA) 2.5-2.5 % Cream, Apply to port 1 hour prior to access of port and cover with plastic wrap., Disp: 30 g, Rfl: 3    OLANZapine (ZYPREXA) 5 MG Tab, Take 1 tablet by mouth nightly on days 1-4 of each chemotherapy cycle., Disp: 4 Tablet, Rfl: 3    cyclobenzaprine (FLEXERIL) 10 mg Tab, Take 10 mg by mouth 3 times a day as needed., Disp: , Rfl:     atorvastatin (LIPITOR) 10 MG Tab, , Disp: , Rfl:     Glucosamine HCl (GLUCOSAMINE PO), Take 1 Dose by mouth every day. (Patient not taking: Reported on 3/28/2025), Disp: , Rfl:       Review of Systems:  Review of Systems   Constitutional:  Positive for malaise/fatigue. Negative for chills, diaphoresis, fever and weight loss.   HENT:  Negative for hearing loss, nosebleeds, sinus pain and sore throat.    Eyes:  Negative for blurred vision and double vision.   Respiratory:  Negative for cough, sputum production, shortness of breath and wheezing.    Cardiovascular:  Negative for chest pain, palpitations, orthopnea and leg swelling.   Gastrointestinal:  Negative for abdominal pain, blood in stool, constipation, diarrhea, heartburn, melena, nausea and vomiting.   Genitourinary:  Negative for dysuria, frequency, hematuria and urgency.   Musculoskeletal:  Positive for joint pain (R knee). Negative for back pain and myalgias.   Skin:  Negative for rash.        Cyst on gluteal region   Neurological:  Positive for weakness. Negative for dizziness, tingling and headaches.   Endo/Heme/Allergies:  Does not bruise/bleed easily.   Psychiatric/Behavioral:  The patient is not nervous/anxious and does not have insomnia.   "        Physical Exam:  Vitals:    04/03/25 1138   BP: 118/62   Pulse: 95   Temp: 36.2 °C (97.1 °F)   TempSrc: Temporal   SpO2: 100%   Weight: 70 kg (154 lb 6.4 oz)   Height: 1.727 m (5' 7.99\")       DESC; KARNOFSKY SCALE WITH ECOG EQUIVALENT: 70, Cares for self; unable to carry on normal activity or to do active work (ECOG equivalent 1)    DISTRESS LEVEL: no apparent distress    Physical Exam  Vitals and nursing note reviewed.   Constitutional:       General: He is awake. He is not in acute distress.     Appearance: Normal appearance. He is normal weight. He is not ill-appearing, toxic-appearing or diaphoretic.   HENT:      Head: Normocephalic and atraumatic.      Nose: Nose normal. No congestion.      Mouth/Throat:      Pharynx: Oropharynx is clear. No oropharyngeal exudate or posterior oropharyngeal erythema.   Eyes:      General: No scleral icterus.     Extraocular Movements: Extraocular movements intact.      Conjunctiva/sclera: Conjunctivae normal.      Pupils: Pupils are equal, round, and reactive to light.   Cardiovascular:      Rate and Rhythm: Normal rate and regular rhythm.      Pulses: Normal pulses.      Heart sounds: Normal heart sounds. No murmur heard.     No friction rub. No gallop.   Pulmonary:      Effort: Pulmonary effort is normal.      Breath sounds: Normal breath sounds. No decreased air movement. No wheezing, rhonchi or rales.   Abdominal:      General: Bowel sounds are normal. There is no distension.      Tenderness: There is no abdominal tenderness.   Musculoskeletal:         General: No deformity. Normal range of motion.      Cervical back: Normal range of motion and neck supple. No tenderness.      Right lower leg: No edema.      Left lower leg: No edema.   Lymphadenopathy:      Cervical: No cervical adenopathy.      Upper Body:      Right upper body: No axillary adenopathy.      Left upper body: No axillary adenopathy.      Lower Body: No right inguinal adenopathy. No left inguinal " adenopathy.   Skin:     General: Skin is warm and dry.      Coloration: Skin is not jaundiced.      Findings: Lesion (Sebaceous cyst on R gluteal area) present. No erythema or rash.   Neurological:      General: No focal deficit present.      Mental Status: He is alert and oriented to person, place, and time.      Sensory: Sensation is intact.      Motor: Weakness present.      Gait: Gait abnormal (Ambulating with cane).   Psychiatric:         Attention and Perception: Attention normal.         Mood and Affect: Mood normal.         Behavior: Behavior normal. Behavior is cooperative.         Thought Content: Thought content normal.         Judgment: Judgment normal.           Labs:  Hospital Outpatient Visit on 03/31/2025   Component Date Value Ref Range Status    Hepatitis B Surface Antigen 03/31/2025 Non-Reactive  Non-Reactive Final    Comment: It has been reported that certain assays will not detect all HBV mutants.  If acute or chronic HBV infection is suspected and the HBsAg result is  negative, it is recommended that other serological markers be tested to  confirm the HBsAg nonreactivity.  HBsAg test results should always be  assessed in conjunction with the patient's medical history, clinical  examination, and other findings.    Note: Assay performance characteristics have not been established when the  Ele[employer]ys HBsAg II assay is used in conjunction with other manufacturers'  assays for specific HBV serological markers. Assay performance  characteristics have not been established for testing of newborns.        Hepatitis B Cors Ab,IgM 03/31/2025 Non-Reactive  Non-Reactive Final    Comment: IgM antibodies to HBc were not detected.  The Roche HBc IgM is a diagnostic test for the qualitative determination of  IgM antibodies to the hepatitis B core antigen in human serum and plasma.  The results should be used and interpreted only in the context of the  overall clinical picture. A negative test result does not  exclude the  possibility of exposure to hepatitis B virus.  Note: Assay performance characteristics have not been established in  patients under the age of 21, pregnant women, or in populations of  immunocompromised or immunosuppressed patients.      Hep B Surface Antibody Quant 03/31/2025 <3.50  0.00 - 10.00 mIU/mL Final    Comment: Individual is considered to be not immune to infection with HBV.  Reference Interval: anti-HBs  < 8.5 mIU/mL..........Negative.  8.5 - 11.4 mIU/mL..........Indeterminate.  = 11.5 mIU/mL..........Positive.  Assay performance characteristics have not been established when the Elecsys  Anti HBs assay is used in conjunction with other manufacturers' assays for  specific HBV serological markers.  For post-vaccination antibody testing guidelines for the general public,  refer to MMWR December 23, 2005/Vol. 54 (No.16);1-23, and for healthcare  workers, refer to MMWR December 20, 2013/Vol.62(No. 10);1-19.      Phosphorus 03/31/2025 3.6  2.5 - 4.5 mg/dL Final    Uric Acid 03/31/2025 4.5  2.5 - 8.3 mg/dL Final    LDH Total 03/31/2025 157  107 - 266 U/L Final    Sodium 03/31/2025 135  135 - 145 mmol/L Final    Potassium 03/31/2025 4.5  3.6 - 5.5 mmol/L Final    Chloride 03/31/2025 103  96 - 112 mmol/L Final    Co2 03/31/2025 22  20 - 33 mmol/L Final    Anion Gap 03/31/2025 10.0  7.0 - 16.0 Final    Glucose 03/31/2025 94  65 - 99 mg/dL Final    Bun 03/31/2025 19  8 - 22 mg/dL Final    Creatinine 03/31/2025 0.79  0.50 - 1.40 mg/dL Final    Calcium 03/31/2025 9.8  8.5 - 10.5 mg/dL Final    Correct Calcium 03/31/2025 10.4  8.5 - 10.5 mg/dL Final    AST(SGOT) 03/31/2025 19  12 - 45 U/L Final    ALT(SGPT) 03/31/2025 14  2 - 50 U/L Final    Alkaline Phosphatase 03/31/2025 91  30 - 99 U/L Final    Total Bilirubin 03/31/2025 0.4  0.1 - 1.5 mg/dL Final    Albumin 03/31/2025 3.3  3.2 - 4.9 g/dL Final    Total Protein 03/31/2025 6.2  6.0 - 8.2 g/dL Final    Globulin 03/31/2025 2.9  1.9 - 3.5 g/dL Final    A-G  Ratio 03/31/2025 1.1  g/dL Final    WBC 03/31/2025 5.2  4.8 - 10.8 K/uL Final    RBC 03/31/2025 3.14 (L)  4.70 - 6.10 M/uL Final    Hemoglobin 03/31/2025 9.0 (L)  14.0 - 18.0 g/dL Final    Hematocrit 03/31/2025 29.6 (L)  42.0 - 52.0 % Final    MCV 03/31/2025 94.3  81.4 - 97.8 fL Final    MCH 03/31/2025 28.7  27.0 - 33.0 pg Final    MCHC 03/31/2025 30.4 (L)  32.3 - 36.5 g/dL Final    RDW 03/31/2025 53.9 (H)  35.9 - 50.0 fL Final    Platelet Count 03/31/2025 395  164 - 446 K/uL Final    MPV 03/31/2025 9.7  9.0 - 12.9 fL Final    Neutrophils-Polys 03/31/2025 56.40  44.00 - 72.00 % Final    Lymphocytes 03/31/2025 18.20 (L)  22.00 - 41.00 % Final    Monocytes 03/31/2025 14.80 (H)  0.00 - 13.40 % Final    Eosinophils 03/31/2025 9.40 (H)  0.00 - 6.90 % Final    Basophils 03/31/2025 1.20  0.00 - 1.80 % Final    Immature Granulocytes 03/31/2025 0.00  0.00 - 0.90 % Final    Nucleated RBC 03/31/2025 0.00  0.00 - 0.20 /100 WBC Final    Neutrophils (Absolute) 03/31/2025 2.94  1.82 - 7.42 K/uL Final    Includes immature neutrophils, if present.    Lymphs (Absolute) 03/31/2025 0.95 (L)  1.00 - 4.80 K/uL Final    Monos (Absolute) 03/31/2025 0.77  0.00 - 0.85 K/uL Final    Eos (Absolute) 03/31/2025 0.49  0.00 - 0.51 K/uL Final    Baso (Absolute) 03/31/2025 0.06  0.00 - 0.12 K/uL Final    Immature Granulocytes (abs) 03/31/2025 0.00  0.00 - 0.11 K/uL Final    NRBC (Absolute) 03/31/2025 0.00  K/uL Final    GFR (CKD-EPI) 03/31/2025 91  >60 mL/min/1.73 m 2 Final    Comment: Estimated Glomerular Filtration Rate is calculated using  race neutral CKD-EPI 2021 equation per NKF-ASN recommendations.     Hospital Outpatient Visit on 03/31/2025   Component Date Value Ref Range Status    Glucose - Accu-Ck 03/31/2025 82  65 - 99 mg/dL Final       Imaging:     All listed images below have been independently reviewed by me. I agree with the findings as summarized below:    IR-CVC PORT PLACEMENT > AGE 5  Result Date: 4/2/2025 4/2/2025 10:32 AM  HISTORY/REASON FOR EXAM:  Patient requires central venous access for chemotherapy. TECHNIQUE/EXAM DESCRIPTION: Following informed consent patient was prepped and draped in the usual fashion.  A total of 14 cc of 1% lidocaine with epinephrine was utilized for local and subcutaneous anesthesia. SEDATION: Moderate sedation was provided. Pulse oximetry and continuous cardiac monitoring by the nurse was performed throughout the exam. Intraservice time was 30 minutes. Fluoroscopy images: 2 fluoroscopic images obtained. Fluoroscopy time: 0.1 minutes Fluoroscopy dose(DAP): 0.0831 Gy*cm^2 The procedure was performed with MAXIMUM STERILE BARRIER TECHNIQUE including sterile gown, mask, cap, and done in a sterile gloves following appropriate hand hygiene and/or sterile scrub. The patient's skin site was prepped with 2% chlorhexidine solution. Ultrasound evaluation of the right internal jugular vein demonstrated patency and an image was archived in the PACS system. Utilizing ultrasonic and fluoroscopic guidance right internal jugular vein was accessed and an 8-Faroese sheath dilator was positioned in the right internal jugular vein.  Next utilizing a combination of blunt and sharp dissection the port pocket was created in the right infraclavicular fossa.  The port was subsequently attached to the catheter and utilizing a tunneling device a subcutaneous tract was created to the neck puncture site.  The catheter was pulled through the tunnel and trimmed to length.  Next the catheter was advanced through the peel-away sheath which was subsequently removed. The port pocket was closed with 4 deep 2-0 Vicryl sutures, the skin was closed Dermabond.  The  neck puncture site was closed with Dermabond and fluoroscopic images were obtained.  The patient tolerated procedure well. COMPARISON:  None FINDINGS: Fluoroscopic images revealed good positioning of the port in the right infraclavicular fossa.  There is a smooth arc of the catheter  into the right internal jugular vein.  The distal tip of the catheter is at the caval atrial junction.     Successful percutaneous placement of Port-A-Cath via right internal jugular approach utilizing ultrasonic and fluoroscopic guidance.    KZ-PAPQI-EIEYP BASE TO MID-THIGH  Result Date: 3/31/2025  3/31/2025 3:24 PM HISTORY/REASON FOR EXAM:  Gastric lymphoma TECHNIQUE/EXAM DESCRIPTION AND NUMBER OF VIEWS: PET body imaging. Initially, 9.1 mCi F-18 FDG was administered intravenously under standardized conditions. Approximately 45 minutes after FDG administration, the patient was placed in the supine position on the PET CT table. Blood glucose level was 82 mg/dL. Low dose spiral CT imaging was performed from the skull base to the mid thighs. PET imaging was then performed from the skull base to the mid thighs. CT images, PET images, and PET/CT fused images were reviewed on a PACS 3D workstation. The limited non-contrast CT data are used primarily for attenuation correction and anatomic correlation.  Evaluation of solid organs and bowel are especially limited utilizing this technique. COMPARISON: Outside CT chest abdomen/pelvis 3/15/2025 FINDINGS: Head and neck: No abnormal focal FDG activity. Chest: No abnormal focal FDG activity. Abdomen and pelvis: Mild increased activity in the gastric fundus, primarily along greater curvature, max SUV 3.42, corresponds to wall thickening on CT.  No abnormal activity in the liver or spleen.  Expected urinary activity.  There is a decreased activity within or adjacent to both kidneys consistent with cysts.  Small curvilinear focus of increased activity in the peripheral RIGHT midabdomen laterally, without CT correlate.  Subcutaneous nodule in the RIGHT gluteal small eccentric focus of increased activity, max is 3.66. Musculoskeletal: No abnormal focal FDG activity. Incidental findings on CT: Coronary artery calcifications.     1.  Wall thickening of the gastric fundus with  "corresponding mild increased activity likely related to patient's known gastric malignancy.  Relative low level uptake may render FDG PET less useful for 2 staging in this patient. 2.  Small curvilinear focus of increased activity in the RIGHT midabdomen laterally of uncertain significance. 3.  Subcutaneous nodule with eccentric increased activity in the RIGHT gluteal region, likely sebaceous cysts with associated inflammation however tumor is not excluded. 4.  No other evidence of metastatic disease.      Pathology:  FINAL DIAGNOSIS:     A.     Gastric lesion, biopsy:              Small mature B-cell lymphoma (CD5-/CD10-), most suggestive   of marginal zone                     lymphoma, focally with features suggestive of   transformation into diffuse large B-                     cell lymphoma (see comment).               FISH for Non-Hodgkin lymphoma ([employer]: [id number]):   No abnormalities                     detected.     B.     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              Squamous epithelium with mild reactive epithelial changes.      Comment:   In part A, the tumor consists predominantly of small mature appearing   lymphocytes. However, the biopsy also shows ulceration with increased   number of larger transformed cells, suggestive of possible   transformation into diffuse large B-cell lymphoma. FISH results show no   evidence of \"double\" or \"triple hit\" lymphoma. There is insufficient   tissue available for additional studies including a Ki67 proliferation   index. A repeat biopsy may be considered if clinically indicated.   Correlation with clinical and other diagnostic information is   recommended..                                        Diagnosis performed by:                                       FRANCHESCA GRIMALDO     Assessment & Plan:  1. Gastric lymphoma (HCC)        2. Sebaceous cyst  Referral to General Surgery        This is a 75 year old  " man with gastric marginal zone lymphoma with diffuse large B cell transformation developing, stage IE disease. He presents for evaluation.      Current Diagnosis and Staging: Gastric B-cell lymphoma, marginal zone with transformation to diffuse large B cell lymphoma, stage IE    Update: The patient has localized disease. Need bone marrow biopsy to confirm. If localized, plan for 3 cycles of R-miniCHOP and then if clear response on PET, will plan for XRT.      Treatment Plan: R-miniCHOP x 3 cycles followed by XRT     Treatment Citation: NCCN     Plan of Care:     Primary Therapy: R-miniCHOP to be started in next few weeks.   Supportive Therapy: Medical management per primary  Toxicity: Patient is getting antineoplastic therapy and needs monitoring of blood counts, hepatic function, and renal function due to potential for organ dysfunction.   Labs: CBC with diff, CMP, Uric acid, LDH, Phos monitoring. Bone marrow biopsy requested.   Imaging: PET scan reviewed. 2D echo pending.   Treatment Planning: Patient has marginal zone lymphoma with areas of transformation to DLBCL. Therefore, will treat with R-miniCHOP x 3 cycles and then XRT for stage IE disease.   Consultations: GI (Dr. Coombs)  Code Status: Full  Miscellaneous: NA  Return for Follow Up: For start of therapy    Any questions and concerns raised by the patient were answered to the best of my ability. Thank you for allowing me to participate in the care for this patient. Please feel free to contact me for any questions or concerns.       Total time spent on chart review, clinic encounter, and documentation: 29 minutes.

## 2025-04-03 NOTE — PROGRESS NOTES
The following appointments, labs, and medications have been provided to the patient:  Line: Port a cath in place  ECHO: NA  WBC Support (g-csf): NA  Labs: CBC w/diff, CMP, Uric acid, LDH   Follow up appts: TBD  Chemo/immunotherapy class: Completed 4/3/2025  Chemotherapy Regimen R-mini CHOP  Nausea medication: zofran/compazine prescribed  Other treatment specific meds: Zyprexa/EMLA cream  Oral chemo follow up: NA  Pain medication: Per provider discretion  Nurse juarez: Referred on 3/25/2025   Dietician:  KALEB  SW: NA  FRA: Referred on 3/25/2025    Additional info/teaching for immunotherapy patients:  If hospitalized, inform staff of immunotherapy treatment and have them contact oncologist - immunotherapy alert card provided.    Additional info/teaching for chemotherapy patients:  Neutropenia reminder card provided to patient      Additional teaching:  NCCN patient guidelines    Patient was provided with drug specific handouts and [facility] side effects sheet. Patient verbalized that questions and concerns regarding treatment have been addressed. Consent to proceed with treatment was reviewed and signed during this visit.    Spent 60 minutes of continuous, non-interrupted, face-to-face patient contact in which greater than 50% of the visit was spent counseling and coordinating of care.

## 2025-04-04 ENCOUNTER — PATIENT OUTREACH (OUTPATIENT)
Dept: ONCOLOGY | Facility: MEDICAL CENTER | Age: 75
End: 2025-04-04
Payer: MEDICARE

## 2025-04-04 DIAGNOSIS — Z65.8 PSYCHOSOCIAL DISTRESS: ICD-10-CM

## 2025-04-04 NOTE — ADDENDUM NOTE
Encounter addended by: Cami Velásquez on: 4/4/2025 9:55 AM   Actions taken: Charge Capture section accepted

## 2025-04-04 NOTE — PROGRESS NOTES
"[facility] Intake    Diagnosis Type:\" B- Cell Lymphoma\"  Preferred Language: English   Insurance: Medicare & AARP  Primary Care Provider Reviewed: yes  Last Appointment Date:\"February 2025 with Libetrad Ger\"  Introduced Andrew Gamez to Oncology Support Services and provided contact information on 4/4/2025 .  Current Barriers Identified Include: None at this time  MyChart Status: Activated, daughter, Cecy, is helping out.  Communication Preferences:Phone Calls; Best Contact Number:[phone]  Patient Contact's Reviewed: yes     Outbound call to patient for [facility] intake, patient on speaker with his daughter Cecy. Patient states he has Family as his support system. Educated patient on Cancer Support Groups & Classes,reviewed the Resource Center, and patient agreed to receive [facility] Resource Folder via mail. Verified address. Patient denies transportation or lodging barriers. Administered Distress Screening, patient scored a six stating \"I feeling like I'm not progressing fast enough with knee surgery\". Cecy stated knee surgery was separate from cancer diagnosis. Encouraged patient to reach out to Cancer Support Services Team if needs arise.      Mailed patient [facility] Resource Folder and included ROYAL Dutton's and CORINNA Keys's card for contact information.     Outcome:  No further needs at this time.          "

## 2025-04-07 DIAGNOSIS — C85.99 GASTRIC LYMPHOMA (HCC): ICD-10-CM

## 2025-04-07 NOTE — Clinical Note
REFERRAL APPROVAL NOTICE         Sent on April 7, 2025                   Andrew Gamez  [address] [facility] [address]                   Dear Mr. Gamez,    After a careful review of the medical information and benefit coverage, [facility] has processed your referral. See below for additional details.    If applicable, you must be actively enrolled with your insurance for coverage of the authorized service. If you have any questions regarding your coverage, please contact your insurance directly.    REFERRAL INFORMATION   Referral #:  [id number]  Referred-To Department    Referred-By Provider:  Surgery    Karsten Raza D.O.   Department Of Surgery      [address]  [address]  [facility] [address]  [phone] [address], [address]  [facility] [id number] [phone]    Referral Start Date:  04/03/2025  Referral End Date:   04/03/2026             SCHEDULING  If you do not already have an appointment, please call [phone] to make an appointment.     MORE INFORMATION  If you do not already have a [facility] account, sign up at: [email or web address]  You can access your medical information, make appointments, see lab results, billing information, and more.  If you have questions regarding this referral, please contact  the [facility] Referrals department at:             [phone]. Monday - Friday 8:00AM - 5:00PM.     Sincerely,    [facility]

## 2025-04-08 ENCOUNTER — HOSPITAL ENCOUNTER (OUTPATIENT)
Dept: CARDIOLOGY | Facility: MEDICAL CENTER | Age: 75
End: 2025-04-08
Attending: STUDENT IN AN ORGANIZED HEALTH CARE EDUCATION/TRAINING PROGRAM
Payer: MEDICARE

## 2025-04-08 ENCOUNTER — HOSPITAL ENCOUNTER (OUTPATIENT)
Dept: HEMATOLOGY ONCOLOGY | Facility: MEDICAL CENTER | Age: 75
End: 2025-04-08
Attending: NURSE PRACTITIONER
Payer: MEDICARE

## 2025-04-08 ENCOUNTER — HOSPITAL ENCOUNTER (OUTPATIENT)
Dept: HEMATOLOGY ONCOLOGY | Facility: MEDICAL CENTER | Age: 75
End: 2025-04-08
Attending: STUDENT IN AN ORGANIZED HEALTH CARE EDUCATION/TRAINING PROGRAM
Payer: MEDICARE

## 2025-04-08 ENCOUNTER — HOSPITAL ENCOUNTER (OUTPATIENT)
Facility: MEDICAL CENTER | Age: 75
End: 2025-04-08
Attending: NURSE PRACTITIONER
Payer: MEDICARE

## 2025-04-08 VITALS
BODY MASS INDEX: 23.57 KG/M2 | WEIGHT: 155 LBS | SYSTOLIC BLOOD PRESSURE: 117 MMHG | OXYGEN SATURATION: 99 % | DIASTOLIC BLOOD PRESSURE: 87 MMHG | RESPIRATION RATE: 15 BRPM | TEMPERATURE: 98.6 F | HEART RATE: 83 BPM

## 2025-04-08 DIAGNOSIS — C85.99 GASTRIC LYMPHOMA (HCC): ICD-10-CM

## 2025-04-08 PROBLEM — E78.5 HYPERLIPIDEMIA: Status: ACTIVE | Noted: 2025-01-20

## 2025-04-08 PROBLEM — R31.0 FRANK HEMATURIA: Status: ACTIVE | Noted: 2023-08-01

## 2025-04-08 PROBLEM — G56.00 CARPAL TUNNEL SYNDROME: Status: ACTIVE | Noted: 2025-04-08

## 2025-04-08 PROBLEM — N35.919 URETHRAL STRICTURE: Status: ACTIVE | Noted: 2023-08-16

## 2025-04-08 PROBLEM — R33.9 RETENTION OF URINE: Status: ACTIVE | Noted: 2025-04-08

## 2025-04-08 PROBLEM — M19.90 OSTEOARTHROSIS: Status: ACTIVE | Noted: 2025-04-08

## 2025-04-08 PROBLEM — Z87.19 HISTORY OF INGUINAL HERNIA REPAIR: Status: ACTIVE | Noted: 2025-04-08

## 2025-04-08 PROBLEM — Z98.890 HISTORY OF INGUINAL HERNIA REPAIR: Status: ACTIVE | Noted: 2025-04-08

## 2025-04-08 LAB
BASOPHILS # BLD AUTO: 1 % (ref 0–1.8)
BASOPHILS # BLD: 0.05 K/UL (ref 0–0.12)
EOSINOPHIL # BLD AUTO: 0.36 K/UL (ref 0–0.51)
EOSINOPHIL NFR BLD: 7.2 % (ref 0–6.9)
ERYTHROCYTE [DISTWIDTH] IN BLOOD BY AUTOMATED COUNT: 51.1 FL (ref 35.9–50)
HCT VFR BLD AUTO: 29.1 % (ref 42–52)
HGB BLD-MCNC: 9 G/DL (ref 14–18)
IMM GRANULOCYTES # BLD AUTO: 0.03 K/UL (ref 0–0.11)
IMM GRANULOCYTES NFR BLD AUTO: 0.6 % (ref 0–0.9)
LV EJECT FRACT  99904: 60
LV EJECT FRACT MOD 2C 99903: 62.99
LV EJECT FRACT MOD 4C 99902: 52.05
LV EJECT FRACT MOD BP 99901: 59.28
LYMPHOCYTES # BLD AUTO: 0.74 K/UL (ref 1–4.8)
LYMPHOCYTES NFR BLD: 14.7 % (ref 22–41)
MCH RBC QN AUTO: 27.3 PG (ref 27–33)
MCHC RBC AUTO-ENTMCNC: 30.9 G/DL (ref 32.3–36.5)
MCV RBC AUTO: 88.2 FL (ref 81.4–97.8)
MONOCYTES # BLD AUTO: 0.55 K/UL (ref 0–0.85)
MONOCYTES NFR BLD AUTO: 11 % (ref 0–13.4)
NEUTROPHILS # BLD AUTO: 3.29 K/UL (ref 1.82–7.42)
NEUTROPHILS NFR BLD: 65.5 % (ref 44–72)
NRBC # BLD AUTO: 0 K/UL
NRBC BLD-RTO: 0 /100 WBC (ref 0–0.2)
PATHOLOGY CONSULT NOTE: NORMAL
PLATELET # BLD AUTO: 332 K/UL (ref 164–446)
PMV BLD AUTO: 9.3 FL (ref 9–12.9)
RBC # BLD AUTO: 3.3 M/UL (ref 4.7–6.1)
WBC # BLD AUTO: 5 K/UL (ref 4.8–10.8)

## 2025-04-08 PROCEDURE — 88313 SPECIAL STAINS GROUP 2: CPT | Performed by: PATHOLOGY

## 2025-04-08 PROCEDURE — 85097 BONE MARROW INTERPRETATION: CPT | Performed by: PATHOLOGY

## 2025-04-08 PROCEDURE — 38222 DX BONE MARROW BX & ASPIR: CPT | Performed by: NURSE PRACTITIONER

## 2025-04-08 PROCEDURE — A4212 NON CORING NEEDLE OR STYLET: HCPCS

## 2025-04-08 PROCEDURE — 88360 TUMOR IMMUNOHISTOCHEM/MANUAL: CPT | Performed by: PATHOLOGY

## 2025-04-08 PROCEDURE — 88368 INSITU HYBRIDIZATION MANUAL: CPT | Performed by: PATHOLOGY

## 2025-04-08 PROCEDURE — 88369 M/PHMTRC ALYSISHQUANT/SEMIQ: CPT | Performed by: PATHOLOGY

## 2025-04-08 PROCEDURE — 88305 TISSUE EXAM BY PATHOLOGIST: CPT | Mod: 26 | Performed by: PATHOLOGY

## 2025-04-08 PROCEDURE — RXMED WILLOW AMBULATORY MEDICATION CHARGE: Performed by: HOSPITALIST

## 2025-04-08 PROCEDURE — 88341 IMHCHEM/IMCYTCHM EA ADD ANTB: CPT | Mod: 91,XU | Performed by: PATHOLOGY

## 2025-04-08 PROCEDURE — 36415 COLL VENOUS BLD VENIPUNCTURE: CPT

## 2025-04-08 PROCEDURE — 88313 SPECIAL STAINS GROUP 2: CPT | Mod: 26 | Performed by: PATHOLOGY

## 2025-04-08 PROCEDURE — 88341 IMHCHEM/IMCYTCHM EA ADD ANTB: CPT | Mod: 26,59 | Performed by: PATHOLOGY

## 2025-04-08 PROCEDURE — 88369 M/PHMTRC ALYSISHQUANT/SEMIQ: CPT | Mod: 26 | Performed by: PATHOLOGY

## 2025-04-08 PROCEDURE — 85025 COMPLETE CBC W/AUTO DIFF WBC: CPT

## 2025-04-08 PROCEDURE — 88305 TISSUE EXAM BY PATHOLOGIST: CPT | Mod: 59 | Performed by: PATHOLOGY

## 2025-04-08 PROCEDURE — 88311 DECALCIFY TISSUE: CPT | Performed by: PATHOLOGY

## 2025-04-08 PROCEDURE — 88311 DECALCIFY TISSUE: CPT | Mod: 26 | Performed by: PATHOLOGY

## 2025-04-08 PROCEDURE — 88342 IMHCHEM/IMCYTCHM 1ST ANTB: CPT | Mod: XU | Performed by: PATHOLOGY

## 2025-04-08 PROCEDURE — 88342 IMHCHEM/IMCYTCHM 1ST ANTB: CPT | Mod: 26,59 | Performed by: PATHOLOGY

## 2025-04-08 PROCEDURE — 93306 TTE W/DOPPLER COMPLETE: CPT

## 2025-04-08 PROCEDURE — 999999 HB NO CHARGE

## 2025-04-08 PROCEDURE — 88368 INSITU HYBRIDIZATION MANUAL: CPT | Mod: 26 | Performed by: PATHOLOGY

## 2025-04-08 PROCEDURE — 93306 TTE W/DOPPLER COMPLETE: CPT | Mod: 26 | Performed by: INTERNAL MEDICINE

## 2025-04-08 PROCEDURE — 88360 TUMOR IMMUNOHISTOCHEM/MANUAL: CPT | Mod: 26 | Performed by: PATHOLOGY

## 2025-04-08 RX ORDER — OXYCODONE HYDROCHLORIDE 5 MG/1
CAPSULE ORAL
COMMUNITY
Start: 2025-03-15

## 2025-04-08 ASSESSMENT — FIBROSIS 4 INDEX: FIB4 SCORE: 0.99

## 2025-04-08 NOTE — PROCEDURES
Bone Marrow Biopsy/Aspiration    Date/Time: 4/8/2025 11:00 AM    Performed by: SEFERINO Arroyo  Authorized by: SEFERINO Arroyo    Consent:     Consent obtained:  Written    Consent given by:  Patient    Risks discussed:  Bleeding, infection and pain  Universal protocol:     Procedure explained and questions answered to patient or proxy's satisfaction: yes      Relevant documents present and verified: yes      Test results available and properly labeled: yes      Imaging studies available: n/a.      Required blood products, implants, devices, and special equipment available: yes      Immediately prior to procedure a time out was called: yes      Site/side marked: yes      Patient identity confirmed:  Verbally with patient and hospital-assigned identification number  Pre-procedure details:     Procedure type:  Aspiration and biopsy    Requesting physician:  Dr. 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    Position:  Left lateral decubitus    Buttock laterality:  Right    Local anesthetic:  1% Lidocaine    Subcutaneous volume:     Periosteum anesthetic volume:  5 mL    Preparation: Patient was prepped and draped in usual sterile fashion    Sedation:     Patient Sedated: No    Procedure details:     Aspirate obtained:  5 mL followed by 5 mL    Blood cultures collected:  None    Biopsy performed:  1 core    Number of attempts:  2    Estimated blood loss (mL):  1  Post-procedure:     Puncture site:  Adhesive bandage applied and direct pressure applied    Patient tolerance of procedure:  Tolerated well, no immediate complications

## 2025-04-08 NOTE — PROGRESS NOTES
Pt arrived for Bone Marrow biopsy.  CBC drawn per protocol.  Procedure explained and Consent form signed.  Vitals taken.  ANGELA Miller performed biopsy of the right posterior iliac crest after Time-Out called.  Pt tolerated the procedure well with minimal bleeding. Bandage applied.  Pt three identifier read back for proper specimen identification. Vitals taken.        Arya Gamez is a 75 y.o. male here for a non-provider visit for a lab draw on 4/8/2025 at 11:21 AM.    Procedure performed:  Venipuncture     Anatomical site:  Right Antecubital Area    Equipment used:  25 g butterfly     Labs drawn:  CBC with differential     Ordering provider:  Rosie MILLER    Lab draw completed by:  Korinne Mitchell, MA    Pt requested port flush.  Patient with emla cream applied to R chest port.  Port accessed in sterile manner. Brisk blood return obtained.  Port flushed per [facility] policy and site covered with sterile gauze and paper tape.  Pt released ambulatory from clinic in no apparent distress with daughter.

## 2025-04-08 NOTE — PROGRESS NOTES
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      Procedure requested by: Dr. Karsten Raza  Procedure completed by JONAS Arroyo.      See procedure note for details      No visits with results within 1 Day(s) from this visit.   Latest known visit with results is:   Hospital Outpatient Visit on 03/31/2025   Component Date Value Ref Range Status    Hepatitis B Surface Antigen 03/31/2025 Non-Reactive  Non-Reactive Final    Comment: It has been reported that certain assays will not detect all HBV mutants.  If acute or chronic HBV infection is suspected and the HBsAg result is  negative, it is recommended that other serological markers be tested to  confirm the HBsAg nonreactivity.  HBsAg test results should always be  assessed in conjunction with the patient's medical history, clinical  examination, and other findings.    Note: Assay performance characteristics have not been established when the  Elecsys HBsAg II assay is used in conjunction with other manufacturers'  assays for specific HBV serological markers. Assay performance  characteristics have not been established for testing of newborns.        Hepatitis B Cors Ab,IgM 03/31/2025 Non-Reactive  Non-Reactive Final    Comment: IgM antibodies to HBc were not detected.  The Roche HBc IgM is a diagnostic test for the qualitative determination of  IgM antibodies to the hepatitis B core antigen in human serum and plasma.  The results should be used and interpreted only in the context of the  overall clinical picture. A negative test result does not exclude the  possibility of exposure to hepatitis B virus.  Note: Assay performance characteristics have not been established in  patients under the age of 21, pregnant women, or in populations of  immunocompromised or  immunosuppressed patients.      Hep B Surface Antibody Quant 03/31/2025 <3.50  0.00 - 10.00 mIU/mL Final    Comment: Individual is considered to be not immune to infection with HBV.  Reference Interval: anti-HBs  < 8.5 mIU/mL..........Negative.  8.5 - 11.4 mIU/mL..........Indeterminate.  = 11.5 mIU/mL..........Positive.  Assay performance characteristics have not been established when the Elecsys  Anti HBs assay is used in conjunction with other manufacturers' assays for  specific HBV serological markers.  For post-vaccination antibody testing guidelines for the general public,  refer to MMWR December 23, 2005/Vol. 54 (No.16);1-23, and for healthcare  workers, refer to MMWR December 20, 2013/Vol.62(No. 10);1-19.      Phosphorus 03/31/2025 3.6  2.5 - 4.5 mg/dL Final    Uric Acid 03/31/2025 4.5  2.5 - 8.3 mg/dL Final    LDH Total 03/31/2025 157  107 - 266 U/L Final    Sodium 03/31/2025 135  135 - 145 mmol/L Final    Potassium 03/31/2025 4.5  3.6 - 5.5 mmol/L Final    Chloride 03/31/2025 103  96 - 112 mmol/L Final    Co2 03/31/2025 22  20 - 33 mmol/L Final    Anion Gap 03/31/2025 10.0  7.0 - 16.0 Final    Glucose 03/31/2025 94  65 - 99 mg/dL Final    Bun 03/31/2025 19  8 - 22 mg/dL Final    Creatinine 03/31/2025 0.79  0.50 - 1.40 mg/dL Final    Calcium 03/31/2025 9.8  8.5 - 10.5 mg/dL Final    Correct Calcium 03/31/2025 10.4  8.5 - 10.5 mg/dL Final    AST(SGOT) 03/31/2025 19  12 - 45 U/L Final    ALT(SGPT) 03/31/2025 14  2 - 50 U/L Final    Alkaline Phosphatase 03/31/2025 91  30 - 99 U/L Final    Total Bilirubin 03/31/2025 0.4  0.1 - 1.5 mg/dL Final    Albumin 03/31/2025 3.3  3.2 - 4.9 g/dL Final    Total Protein 03/31/2025 6.2  6.0 - 8.2 g/dL Final    Globulin 03/31/2025 2.9  1.9 - 3.5 g/dL Final    A-G Ratio 03/31/2025 1.1  g/dL Final    WBC 03/31/2025 5.2  4.8 - 10.8 K/uL Final    RBC 03/31/2025 3.14 (L)  4.70 - 6.10 M/uL Final    Hemoglobin 03/31/2025 9.0 (L)  14.0 - 18.0 g/dL Final    Hematocrit 03/31/2025 29.6  (L)  42.0 - 52.0 % Final    MCV 03/31/2025 94.3  81.4 - 97.8 fL Final    MCH 03/31/2025 28.7  27.0 - 33.0 pg Final    MCHC 03/31/2025 30.4 (L)  32.3 - 36.5 g/dL Final    RDW 03/31/2025 53.9 (H)  35.9 - 50.0 fL Final    Platelet Count 03/31/2025 395  164 - 446 K/uL Final    MPV 03/31/2025 9.7  9.0 - 12.9 fL Final    Neutrophils-Polys 03/31/2025 56.40  44.00 - 72.00 % Final    Lymphocytes 03/31/2025 18.20 (L)  22.00 - 41.00 % Final    Monocytes 03/31/2025 14.80 (H)  0.00 - 13.40 % Final    Eosinophils 03/31/2025 9.40 (H)  0.00 - 6.90 % Final    Basophils 03/31/2025 1.20  0.00 - 1.80 % Final    Immature Granulocytes 03/31/2025 0.00  0.00 - 0.90 % Final    Nucleated RBC 03/31/2025 0.00  0.00 - 0.20 /100 WBC Final    Neutrophils (Absolute) 03/31/2025 2.94  1.82 - 7.42 K/uL Final    Includes immature neutrophils, if present.    Lymphs (Absolute) 03/31/2025 0.95 (L)  1.00 - 4.80 K/uL Final    Monos (Absolute) 03/31/2025 0.77  0.00 - 0.85 K/uL Final    Eos (Absolute) 03/31/2025 0.49  0.00 - 0.51 K/uL Final    Baso (Absolute) 03/31/2025 0.06  0.00 - 0.12 K/uL Final    Immature Granulocytes (abs) 03/31/2025 0.00  0.00 - 0.11 K/uL Final    NRBC (Absolute) 03/31/2025 0.00  K/uL Final    GFR (CKD-EPI) 03/31/2025 91  >60 mL/min/1.73 m 2 Final    Comment: Estimated Glomerular Filtration Rate is calculated using  race neutral CKD-EPI 2021 equation per NKF-ASN recommendations.           1. Gastric lymphoma (HCC)

## 2025-04-09 ENCOUNTER — PATIENT OUTREACH (OUTPATIENT)
Dept: ONCOLOGY | Facility: MEDICAL CENTER | Age: 75
End: 2025-04-09
Payer: MEDICARE

## 2025-04-09 RX ORDER — ALBUTEROL SULFATE 5 MG/ML
2.5 SOLUTION RESPIRATORY (INHALATION) PRN
Status: CANCELLED | OUTPATIENT
Start: 2025-04-23

## 2025-04-09 RX ORDER — SODIUM CHLORIDE 9 MG/ML
INJECTION, SOLUTION INTRAVENOUS CONTINUOUS
Status: CANCELLED | OUTPATIENT
Start: 2025-04-23

## 2025-04-09 RX ORDER — HYDROCORTISONE SODIUM SUCCINATE 100 MG/2ML
100 INJECTION INTRAMUSCULAR; INTRAVENOUS PRN
Status: CANCELLED | OUTPATIENT
Start: 2025-04-23 | End: 2025-04-25

## 2025-04-09 RX ORDER — 0.9 % SODIUM CHLORIDE 0.9 %
3 VIAL (ML) INJECTION PRN
Status: CANCELLED | OUTPATIENT
Start: 2025-04-23

## 2025-04-09 RX ORDER — ONDANSETRON 8 MG/1
8 TABLET, ORALLY DISINTEGRATING ORAL PRN
Status: CANCELLED | OUTPATIENT
Start: 2025-04-23

## 2025-04-09 RX ORDER — METHYLPREDNISOLONE SODIUM SUCCINATE 125 MG/2ML
125 INJECTION, POWDER, LYOPHILIZED, FOR SOLUTION INTRAMUSCULAR; INTRAVENOUS PRN
Status: CANCELLED | OUTPATIENT
Start: 2025-04-23

## 2025-04-09 RX ORDER — EPINEPHRINE 1 MG/ML(1)
0.5 AMPUL (ML) INJECTION PRN
Status: CANCELLED | OUTPATIENT
Start: 2025-04-23

## 2025-04-09 RX ORDER — DIPHENHYDRAMINE HYDROCHLORIDE 50 MG/ML
25 INJECTION, SOLUTION INTRAMUSCULAR; INTRAVENOUS PRN
Status: CANCELLED | OUTPATIENT
Start: 2025-04-23

## 2025-04-09 RX ORDER — 0.9 % SODIUM CHLORIDE 0.9 %
10 VIAL (ML) INJECTION PRN
Status: CANCELLED | OUTPATIENT
Start: 2025-04-23

## 2025-04-09 RX ORDER — LORAZEPAM 0.5 MG/1
0.5 TABLET ORAL PRN
Status: CANCELLED | OUTPATIENT
Start: 2025-04-23

## 2025-04-09 RX ORDER — PROCHLORPERAZINE MALEATE 10 MG
10 TABLET ORAL EVERY 6 HOURS PRN
Status: CANCELLED | OUTPATIENT
Start: 2025-04-23

## 2025-04-09 RX ORDER — MEPERIDINE HYDROCHLORIDE 25 MG/ML
25 INJECTION INTRAMUSCULAR; INTRAVENOUS; SUBCUTANEOUS PRN
Status: CANCELLED | OUTPATIENT
Start: 2025-04-23

## 2025-04-09 RX ORDER — SODIUM CHLORIDE 9 MG/ML
1000 INJECTION, SOLUTION INTRAVENOUS PRN
Status: CANCELLED | OUTPATIENT
Start: 2025-04-23

## 2025-04-09 RX ORDER — MEPERIDINE HYDROCHLORIDE 25 MG/ML
25 INJECTION INTRAMUSCULAR; INTRAVENOUS; SUBCUTANEOUS PRN
Status: CANCELLED | OUTPATIENT
Start: 2025-04-23 | End: 2025-04-25

## 2025-04-09 RX ORDER — SODIUM CHLORIDE 9 MG/ML
500 INJECTION, SOLUTION INTRAVENOUS ONCE
Status: CANCELLED | OUTPATIENT
Start: 2025-04-23

## 2025-04-09 RX ORDER — SODIUM CHLORIDE 9 MG/ML
500 INJECTION, SOLUTION INTRAVENOUS ONCE
Status: CANCELLED | OUTPATIENT
Start: 2025-04-23 | End: 2025-04-23

## 2025-04-09 RX ORDER — ONDANSETRON 2 MG/ML
8 INJECTION INTRAMUSCULAR; INTRAVENOUS PRN
Status: CANCELLED | OUTPATIENT
Start: 2025-04-23

## 2025-04-09 RX ORDER — PALONOSETRON 0.05 MG/ML
0.25 INJECTION, SOLUTION INTRAVENOUS ONCE
Status: CANCELLED | OUTPATIENT
Start: 2025-04-23 | End: 2025-04-23

## 2025-04-09 RX ORDER — 0.9 % SODIUM CHLORIDE 0.9 %
10 VIAL (ML) INJECTION PRN
Status: CANCELLED | OUTPATIENT
Start: 2025-04-22

## 2025-04-09 RX ORDER — ACETAMINOPHEN 325 MG/1
650 TABLET ORAL PRN
Status: CANCELLED | OUTPATIENT
Start: 2025-04-23

## 2025-04-09 RX ORDER — 0.9 % SODIUM CHLORIDE 0.9 %
VIAL (ML) INJECTION PRN
Status: CANCELLED | OUTPATIENT
Start: 2025-04-23

## 2025-04-09 RX ORDER — DIPHENHYDRAMINE HYDROCHLORIDE 50 MG/ML
25 INJECTION, SOLUTION INTRAMUSCULAR; INTRAVENOUS ONCE
Status: CANCELLED | OUTPATIENT
Start: 2025-04-23 | End: 2025-04-23

## 2025-04-09 RX ORDER — 0.9 % SODIUM CHLORIDE 0.9 %
3 VIAL (ML) INJECTION PRN
Status: CANCELLED | OUTPATIENT
Start: 2025-04-22

## 2025-04-09 RX ORDER — LORAZEPAM 2 MG/ML
0.5 INJECTION INTRAMUSCULAR PRN
Status: CANCELLED | OUTPATIENT
Start: 2025-04-23

## 2025-04-09 RX ORDER — ACETAMINOPHEN 325 MG/1
650 TABLET ORAL ONCE
Status: CANCELLED | OUTPATIENT
Start: 2025-04-23 | End: 2025-04-23

## 2025-04-09 RX ORDER — DIPHENHYDRAMINE HYDROCHLORIDE 50 MG/ML
25 INJECTION, SOLUTION INTRAMUSCULAR; INTRAVENOUS PRN
Status: CANCELLED | OUTPATIENT
Start: 2025-04-23 | End: 2025-04-25

## 2025-04-09 RX ORDER — ACETAMINOPHEN 325 MG/1
650 TABLET ORAL PRN
Status: CANCELLED | OUTPATIENT
Start: 2025-04-23 | End: 2025-04-25

## 2025-04-09 RX ORDER — 0.9 % SODIUM CHLORIDE 0.9 %
VIAL (ML) INJECTION PRN
Status: CANCELLED | OUTPATIENT
Start: 2025-04-22

## 2025-04-09 NOTE — PROGRESS NOTES
First call placed to Andrew to introduce self and assess. No answer at time of call, LVM with reason for call and call back number. Andrew completed his work up yesterday 4/8: BMBX done in [facility] and ECHO completed prior to chemotherapy. Dr. Raza with plans to start Rmini-CHOP. PAC in place since 4/2/25.

## 2025-04-10 ENCOUNTER — PHARMACY VISIT (OUTPATIENT)
Dept: PHARMACY | Facility: MEDICAL CENTER | Age: 75
End: 2025-04-10
Payer: MEDICARE

## 2025-04-10 ENCOUNTER — HOSPITAL ENCOUNTER (OUTPATIENT)
Dept: LAB | Facility: MEDICAL CENTER | Age: 75
End: 2025-04-10
Attending: STUDENT IN AN ORGANIZED HEALTH CARE EDUCATION/TRAINING PROGRAM
Payer: MEDICARE

## 2025-04-10 DIAGNOSIS — Z79.899 HIGH RISK MEDICATION USE: ICD-10-CM

## 2025-04-10 DIAGNOSIS — C85.99 GASTRIC LYMPHOMA (HCC): ICD-10-CM

## 2025-04-10 LAB
ALBUMIN SERPL BCP-MCNC: 3.4 G/DL (ref 3.2–4.9)
ALBUMIN/GLOB SERPL: 1.2 G/DL
ALP SERPL-CCNC: 87 U/L (ref 30–99)
ALT SERPL-CCNC: 13 U/L (ref 2–50)
ANION GAP SERPL CALC-SCNC: 7 MMOL/L (ref 7–16)
AST SERPL-CCNC: 18 U/L (ref 12–45)
BASOPHILS # BLD AUTO: 0.6 % (ref 0–1.8)
BASOPHILS # BLD: 0.05 K/UL (ref 0–0.12)
BILIRUB SERPL-MCNC: 0.2 MG/DL (ref 0.1–1.5)
BUN SERPL-MCNC: 26 MG/DL (ref 8–22)
CALCIUM ALBUM COR SERPL-MCNC: 10.3 MG/DL (ref 8.5–10.5)
CALCIUM SERPL-MCNC: 9.8 MG/DL (ref 8.5–10.5)
CHLORIDE SERPL-SCNC: 103 MMOL/L (ref 96–112)
CO2 SERPL-SCNC: 26 MMOL/L (ref 20–33)
COMMENT 1642: NORMAL
CREAT SERPL-MCNC: 0.72 MG/DL (ref 0.5–1.4)
EOSINOPHIL # BLD AUTO: 0.36 K/UL (ref 0–0.51)
EOSINOPHIL NFR BLD: 4.6 % (ref 0–6.9)
ERYTHROCYTE [DISTWIDTH] IN BLOOD BY AUTOMATED COUNT: 52.5 FL (ref 35.9–50)
GFR SERPLBLD CREATININE-BSD FMLA CKD-EPI: 94 ML/MIN/1.73 M 2
GLOBULIN SER CALC-MCNC: 2.8 G/DL (ref 1.9–3.5)
GLUCOSE SERPL-MCNC: 96 MG/DL (ref 65–99)
HCT VFR BLD AUTO: 30.1 % (ref 42–52)
HGB BLD-MCNC: 9 G/DL (ref 14–18)
HYPOCHROMIA BLD QL SMEAR: ABNORMAL
IMM GRANULOCYTES # BLD AUTO: 0.02 K/UL (ref 0–0.11)
IMM GRANULOCYTES NFR BLD AUTO: 0.3 % (ref 0–0.9)
LDH SERPL L TO P-CCNC: 156 U/L (ref 107–266)
LYMPHOCYTES # BLD AUTO: 1.08 K/UL (ref 1–4.8)
LYMPHOCYTES NFR BLD: 13.8 % (ref 22–41)
MCH RBC QN AUTO: 26.6 PG (ref 27–33)
MCHC RBC AUTO-ENTMCNC: 29.9 G/DL (ref 32.3–36.5)
MCV RBC AUTO: 89.1 FL (ref 81.4–97.8)
MONOCYTES # BLD AUTO: 0.82 K/UL (ref 0–0.85)
MONOCYTES NFR BLD AUTO: 10.5 % (ref 0–13.4)
MORPHOLOGY BLD-IMP: NORMAL
NEUTROPHILS # BLD AUTO: 5.47 K/UL (ref 1.82–7.42)
NEUTROPHILS NFR BLD: 70.2 % (ref 44–72)
NRBC # BLD AUTO: 0 K/UL
NRBC BLD-RTO: 0 /100 WBC (ref 0–0.2)
PHOSPHATE SERPL-MCNC: 3.1 MG/DL (ref 2.5–4.5)
PLATELET # BLD AUTO: 341 K/UL (ref 164–446)
PLATELET BLD QL SMEAR: NORMAL
PMV BLD AUTO: 9.5 FL (ref 9–12.9)
POTASSIUM SERPL-SCNC: 4.4 MMOL/L (ref 3.6–5.5)
PROT SERPL-MCNC: 6.2 G/DL (ref 6–8.2)
RBC # BLD AUTO: 3.38 M/UL (ref 4.7–6.1)
RBC BLD AUTO: PRESENT
SODIUM SERPL-SCNC: 136 MMOL/L (ref 135–145)
URATE SERPL-MCNC: 4.1 MG/DL (ref 2.5–8.3)
WBC # BLD AUTO: 7.8 K/UL (ref 4.8–10.8)

## 2025-04-10 PROCEDURE — 85025 COMPLETE CBC W/AUTO DIFF WBC: CPT

## 2025-04-10 PROCEDURE — 36415 COLL VENOUS BLD VENIPUNCTURE: CPT

## 2025-04-10 PROCEDURE — 80053 COMPREHEN METABOLIC PANEL: CPT

## 2025-04-10 PROCEDURE — 84550 ASSAY OF BLOOD/URIC ACID: CPT

## 2025-04-10 PROCEDURE — 83615 LACTATE (LD) (LDH) ENZYME: CPT

## 2025-04-10 PROCEDURE — 84100 ASSAY OF PHOSPHORUS: CPT

## 2025-04-11 ENCOUNTER — OUTPATIENT INFUSION SERVICES (OUTPATIENT)
Dept: ONCOLOGY | Facility: MEDICAL CENTER | Age: 75
End: 2025-04-11
Attending: STUDENT IN AN ORGANIZED HEALTH CARE EDUCATION/TRAINING PROGRAM
Payer: MEDICARE

## 2025-04-11 ENCOUNTER — PHARMACY VISIT (OUTPATIENT)
Dept: PHARMACY | Facility: MEDICAL CENTER | Age: 75
End: 2025-04-11
Payer: MEDICARE

## 2025-04-11 VITALS
HEIGHT: 68 IN | SYSTOLIC BLOOD PRESSURE: 108 MMHG | RESPIRATION RATE: 18 BRPM | OXYGEN SATURATION: 98 % | WEIGHT: 157.19 LBS | DIASTOLIC BLOOD PRESSURE: 71 MMHG | TEMPERATURE: 98.5 F | HEART RATE: 97 BPM | BODY MASS INDEX: 23.82 KG/M2

## 2025-04-11 DIAGNOSIS — N40.1 BENIGN PROSTATIC HYPERPLASIA WITH URINARY OBSTRUCTION: ICD-10-CM

## 2025-04-11 DIAGNOSIS — C85.99 GASTRIC LYMPHOMA (HCC): ICD-10-CM

## 2025-04-11 DIAGNOSIS — N13.8 BENIGN PROSTATIC HYPERPLASIA WITH URINARY OBSTRUCTION: ICD-10-CM

## 2025-04-11 DIAGNOSIS — Z79.899 HIGH RISK MEDICATION USE: ICD-10-CM

## 2025-04-11 DIAGNOSIS — C96.A HISTIOCYTIC SARCOMA (HCC): ICD-10-CM

## 2025-04-11 PROCEDURE — 96361 HYDRATE IV INFUSION ADD-ON: CPT

## 2025-04-11 PROCEDURE — 96411 CHEMO IV PUSH ADDL DRUG: CPT

## 2025-04-11 PROCEDURE — RXMED WILLOW AMBULATORY MEDICATION CHARGE: Performed by: STUDENT IN AN ORGANIZED HEALTH CARE EDUCATION/TRAINING PROGRAM

## 2025-04-11 PROCEDURE — 96413 CHEMO IV INFUSION 1 HR: CPT

## 2025-04-11 PROCEDURE — 96417 CHEMO IV INFUS EACH ADDL SEQ: CPT

## 2025-04-11 PROCEDURE — 700111 HCHG RX REV CODE 636 W/ 250 OVERRIDE (IP): Performed by: STUDENT IN AN ORGANIZED HEALTH CARE EDUCATION/TRAINING PROGRAM

## 2025-04-11 PROCEDURE — 96375 TX/PRO/DX INJ NEW DRUG ADDON: CPT

## 2025-04-11 PROCEDURE — 96415 CHEMO IV INFUSION ADDL HR: CPT

## 2025-04-11 PROCEDURE — A4212 NON CORING NEEDLE OR STYLET: HCPCS

## 2025-04-11 PROCEDURE — 700105 HCHG RX REV CODE 258: Performed by: STUDENT IN AN ORGANIZED HEALTH CARE EDUCATION/TRAINING PROGRAM

## 2025-04-11 PROCEDURE — A9270 NON-COVERED ITEM OR SERVICE: HCPCS | Performed by: STUDENT IN AN ORGANIZED HEALTH CARE EDUCATION/TRAINING PROGRAM

## 2025-04-11 PROCEDURE — 96367 TX/PROPH/DG ADDL SEQ IV INF: CPT

## 2025-04-11 PROCEDURE — 700102 HCHG RX REV CODE 250 W/ 637 OVERRIDE(OP): Performed by: STUDENT IN AN ORGANIZED HEALTH CARE EDUCATION/TRAINING PROGRAM

## 2025-04-11 RX ORDER — ONDANSETRON 2 MG/ML
8 INJECTION INTRAMUSCULAR; INTRAVENOUS PRN
Status: DISCONTINUED | OUTPATIENT
Start: 2025-04-11 | End: 2025-04-11 | Stop reason: HOSPADM

## 2025-04-11 RX ORDER — TAMSULOSIN HYDROCHLORIDE 0.4 MG/1
0.8 CAPSULE ORAL DAILY
Qty: 30 CAPSULE | Refills: 0 | Status: CANCELLED | OUTPATIENT
Start: 2025-04-08

## 2025-04-11 RX ORDER — SODIUM CHLORIDE 9 MG/ML
500 INJECTION, SOLUTION INTRAVENOUS ONCE
Status: COMPLETED | OUTPATIENT
Start: 2025-04-11 | End: 2025-04-11

## 2025-04-11 RX ORDER — LORAZEPAM 1 MG/1
0.5 TABLET ORAL PRN
Status: DISCONTINUED | OUTPATIENT
Start: 2025-04-11 | End: 2025-04-11 | Stop reason: HOSPADM

## 2025-04-11 RX ORDER — PALONOSETRON 0.05 MG/ML
0.25 INJECTION, SOLUTION INTRAVENOUS ONCE
Status: COMPLETED | OUTPATIENT
Start: 2025-04-11 | End: 2025-04-11

## 2025-04-11 RX ORDER — MEPERIDINE HYDROCHLORIDE 25 MG/ML
25 INJECTION INTRAMUSCULAR; INTRAVENOUS; SUBCUTANEOUS PRN
Status: DISCONTINUED | OUTPATIENT
Start: 2025-04-11 | End: 2025-04-11 | Stop reason: HOSPADM

## 2025-04-11 RX ORDER — ALLOPURINOL 300 MG/1
300 TABLET ORAL DAILY
Qty: 30 TABLET | Refills: 3 | Status: SHIPPED | OUTPATIENT
Start: 2025-04-11

## 2025-04-11 RX ORDER — DIPHENHYDRAMINE HYDROCHLORIDE 50 MG/ML
25 INJECTION, SOLUTION INTRAMUSCULAR; INTRAVENOUS PRN
Status: DISCONTINUED | OUTPATIENT
Start: 2025-04-11 | End: 2025-04-11 | Stop reason: HOSPADM

## 2025-04-11 RX ORDER — EPINEPHRINE 1 MG/ML(1)
0.5 AMPUL (ML) INJECTION PRN
Status: DISCONTINUED | OUTPATIENT
Start: 2025-04-11 | End: 2025-04-11 | Stop reason: HOSPADM

## 2025-04-11 RX ORDER — ACETAMINOPHEN 325 MG/1
650 TABLET ORAL ONCE
Status: DISCONTINUED | OUTPATIENT
Start: 2025-04-11 | End: 2025-04-11 | Stop reason: HOSPADM

## 2025-04-11 RX ORDER — ALBUTEROL SULFATE 5 MG/ML
2.5 SOLUTION RESPIRATORY (INHALATION) PRN
Status: DISCONTINUED | OUTPATIENT
Start: 2025-04-11 | End: 2025-04-11 | Stop reason: HOSPADM

## 2025-04-11 RX ORDER — METHYLPREDNISOLONE SODIUM SUCCINATE 125 MG/2ML
125 INJECTION, POWDER, LYOPHILIZED, FOR SOLUTION INTRAMUSCULAR; INTRAVENOUS PRN
Status: DISCONTINUED | OUTPATIENT
Start: 2025-04-11 | End: 2025-04-11 | Stop reason: HOSPADM

## 2025-04-11 RX ORDER — PREDNISONE 50 MG/1
75 TABLET ORAL ONCE
Status: COMPLETED | OUTPATIENT
Start: 2025-04-11 | End: 2025-04-11

## 2025-04-11 RX ORDER — DIPHENHYDRAMINE HYDROCHLORIDE 50 MG/ML
25 INJECTION, SOLUTION INTRAMUSCULAR; INTRAVENOUS ONCE
Status: COMPLETED | OUTPATIENT
Start: 2025-04-11 | End: 2025-04-11

## 2025-04-11 RX ORDER — SODIUM CHLORIDE 9 MG/ML
1000 INJECTION, SOLUTION INTRAVENOUS PRN
Status: DISCONTINUED | OUTPATIENT
Start: 2025-04-11 | End: 2025-04-11 | Stop reason: HOSPADM

## 2025-04-11 RX ORDER — PREDNISONE 20 MG/1
70 TABLET ORAL DAILY
Qty: 30 TABLET | Refills: 0 | Status: SHIPPED | OUTPATIENT
Start: 2025-04-11

## 2025-04-11 RX ORDER — ACETAMINOPHEN 325 MG/1
650 TABLET ORAL PRN
Status: DISCONTINUED | OUTPATIENT
Start: 2025-04-11 | End: 2025-04-11 | Stop reason: HOSPADM

## 2025-04-11 RX ORDER — ONDANSETRON 8 MG/1
8 TABLET, ORALLY DISINTEGRATING ORAL PRN
Status: DISCONTINUED | OUTPATIENT
Start: 2025-04-11 | End: 2025-04-11 | Stop reason: HOSPADM

## 2025-04-11 RX ORDER — LORAZEPAM 2 MG/ML
0.5 INJECTION INTRAMUSCULAR PRN
Status: DISCONTINUED | OUTPATIENT
Start: 2025-04-11 | End: 2025-04-11 | Stop reason: HOSPADM

## 2025-04-11 RX ADMIN — DOXORUBICIN HYDROCHLORIDE 46 MG: 2 INJECTION, SOLUTION INTRAVENOUS at 12:47

## 2025-04-11 RX ADMIN — VINCRISTINE SULFATE 1 MG: 1 INJECTION, SOLUTION INTRAVENOUS at 13:22

## 2025-04-11 RX ADMIN — CYCLOPHOSPHAMIDE 750 MG: 200 INJECTION, SOLUTION INTRAVENOUS at 12:10

## 2025-04-11 RX ADMIN — FOSAPREPITANT 150 MG: 150 INJECTION, POWDER, LYOPHILIZED, FOR SOLUTION INTRAVENOUS at 07:47

## 2025-04-11 RX ADMIN — SODIUM CHLORIDE 500 ML: 9 INJECTION, SOLUTION INTRAVENOUS at 12:47

## 2025-04-11 RX ADMIN — PALONOSETRON HYDROCHLORIDE 0.25 MG: 0.25 INJECTION INTRAVENOUS at 07:37

## 2025-04-11 RX ADMIN — SODIUM CHLORIDE 500 ML: 9 INJECTION, SOLUTION INTRAVENOUS at 07:33

## 2025-04-11 RX ADMIN — DIPHENHYDRAMINE HYDROCHLORIDE 25 MG: 50 INJECTION INTRAMUSCULAR; INTRAVENOUS at 07:42

## 2025-04-11 RX ADMIN — PREDNISONE 75 MG: 50 TABLET ORAL at 08:11

## 2025-04-11 RX ADMIN — SODIUM CHLORIDE 700 MG: 9 INJECTION, SOLUTION INTRAVENOUS at 08:27

## 2025-04-11 ASSESSMENT — FIBROSIS 4 INDEX: FIB4 SCORE: 1.13

## 2025-04-11 NOTE — PROGRESS NOTES
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 NCCN Guidelines for B-Cell Lymphomas V.2.2025.  2. Millicent F et al. Lancet Oncol. 2011;12(5):460-8.  3. Al-Anahi D, et al. Am J Hematol. 2024;99(2):216-222.    Allergies:  Penicillins     /71   Pulse 97   Temp 36.9 °C (98.5 °F) (Temporal)   Resp 18   Ht 1.72 m (5' 7.72\")   Wt 71.3 kg (157 lb 3 oz)   SpO2 98%   BMI 24.10 kg/m²  Body surface area is 1.85 meters squared.    Labs 4/10/25:  ANC~ 5470 Plt = 341k   Hgb = 9     SCr = 0.72mg/dL CrCl ~ 86mL/min   AST/ALT/AP = WNL TBili = 0.2       03/31/25 15:05   Hepatitis B Surface Antigen Non-Reactive   Hepatitis B Cors Ab,IgM Non-Reactive        Drug Order   (Drug name, dose, route, IV Fluid & volume, frequency, number of doses) Cycle 1, Day 1      Previous treatment: none     Medication = rituximab  Base Dose = 375 mg/m2  Calc Dose: Base Dose x 1.85 m² = 693.75 mg  Final Dose = 700 mg  Route = IV  Fluid & Volume =  mL  Admin Duration = see rituximab rate sheet          <10% difference, okay to treat with final dose     Medication = cyclophosphamide  Base Dose = 400 mg/m2  Calc Dose: Base Dose x 1.85 m² = 740 mg  Final Dose = 750 mg  Route = IV  Fluid & Volume =  mL  Admin Duration = Over 30 minutes          <10% difference, okay to treat with final dose     Medication = doxorubicin  Base Dose = 25 mg/m2  Calc Dose:Base Dose x 1.85 m² = 46.25 mg  Final Dose = 46 mg  Route = IV  Fluid & Volume = 2 mg/mL; 23 mL  Admin Duration = Over 20 minutes "          <10% difference, okay to treat with final dose     Medication = vincristine  Base Dose = 1 mg  Calc Dose: fixed dose  Final Dose = 1 mg  Route = IV  Fluid & Volume = NS 25 mL  Admin Duration = Over 5-10 minutes          <10% difference, okay to treat with final dose       By my signature below, I confirm this process was performed independently with the BSA and all final chemotherapy dosing calculations congruent. I have reviewed the above chemotherapy order and that my calculation of the final dose and BSA (when applicable) corroborate those calculations of the  pharmacist. Discrepancies of 10% or greater in the written dose have been addressed and documented within the [facility] Progress notes.      Dina Sutton, PharmD

## 2025-04-11 NOTE — PROGRESS NOTES
Chemotherapy Verification - PRIMARY RN      Height = 1.72 m  Weight = 71.3 kg  BSA = 1.85 m2       Medication: Ruxience  Dose: 375 mg/m2  Calculated Dose: 693.75 mg                             (In mg/m2, AUC, mg/kg)     Medication: Cytoxan  Dose: 400 mg/m2  Calculated Dose: 740 mg                             (In mg/m2, AUC, mg/kg)    Medication: Adriamycin  Dose: 25 mg/m2  Calculated Dose: 46.25 mg                            (In mg/m2, AUC, mg/kg)    Medication: Vincristine  Dose: Set dose  Calculated Dose: 1 mg                             (In mg/m2, AUC, mg/kg)    I confirm this process was performed independently with the BSA and all final chemotherapy dosing calculations congruent.  Any discrepancies of 10% or greater have been addressed with the chemotherapy pharmacist. The resolution of the discrepancy has been documented in the EPIC progress notes.

## 2025-04-11 NOTE — PROGRESS NOTES
"Pharmacy Chemotherapy calculation:    DX: Gastric B-cell lymphoma, marginal zone lymphoma with transformation to DLBCL    Cycle 1     Previous treatment = n/a    Regimen: Mini-RCHOP    *Dosing Reference*  Cyclophosphamide 400 mg/m2 IV over 30 minutes on Day 1  Doxorubicin 25 mg/m2 IV push on Day 1  Vincristine 1 mg IV over 5-10 minutes on Day 1  Prednisone 40 mg/m2 PO daily on Days 1-5  Rituximab 375 mg/m2 IV on Day 1  21-day cycle for 6 cycles  NCCN Guidelines for B-Cell Lymphomas V.2.2025.  Millicent F, et al. Lancet Oncol. 2011;12(5):460-8.  Al-Anahi D, et al. Am J Hematol. 2024;99(2):216-222.    Allergies: Penicillins   /71   Pulse 97   Temp 36.9 °C (98.5 °F) (Temporal)   Resp 18   Ht 1.72 m (5' 7.72\")   Wt 71.3 kg (157 lb 3 oz)   SpO2 98%   BMI 24.10 kg/m²   Body surface area is 1.85 meters squared.    Labs 4/10/25  ANC~ 5470 Plt = 341k   Hgb = 9     SCr = 0.72 mg/dL CrCl ~ 86.3 mL/min   LFT's = WNLs  TBili = 0.2      03/31/25 15:05   Hepatitis B Surface Antigen Non-Reactive   Hepatitis B Cors Ab,IgM Non-Reactive     4/8/25 ECHO LVEF 60%    Rituximab-pvvr 375 mg/m2 x 1.85 m2 = 693.75 mg   <10% difference, okay to treat with final dose = 700 mg IV    Cyclophosphamide 400 mg/m2 x 1.85 m2 = 740 mg   <10% difference, okay to treat with final dose = 750 mg IV    Doxorubicin 25 mg/m2 x 1.85 m2 = 46.25 mg   <10% difference, okay to treat with final dose = 46 mg IV    Vincristine 1 mg fixed dose = 1 mg   <10% difference, okay to treat with final dose = 1 mg IV    Rogerio Elias, PharmD    "

## 2025-04-11 NOTE — PROGRESS NOTES
Patient arrived to unit for RCHOP. This is his first dose. Educated patient on medications, including purpose, risks, benefits, and when to go to to the ER. All questions answered. Handout provided. Labs reviewed. Okay to proceed with treatment. Verified patient picking up prescription of prednisone and allopurinol today. Will administer today.    Port accessed per facility protocol. Blood return noted. Flushed with 10 mL NS. No erythema, edema, or pain noted.    500 mL NS and premeds administered.    Ruxience administered per titration protocol.    Cytoxan administered.     Doxorubin administered.    Vincristine administered.    500 mL NS administered.    Blood return noted from Port. Flushed with 20 mL NS. No erythema, edema, or pain noted. Port deaccessed per facility protocol. Site covered with bandaid.    Patient tolerated treatment without any adverse effects. Future appointments confirmed. Patient discharged home in stable condition

## 2025-04-11 NOTE — PROGRESS NOTES
Chemotherapy Verification - SECONDARY RN       Height = 1.72 m  Weight = 71.3 kg  BSA = 1.85 m2       Medication: rituximab-pvr (Ruxience)  Dose: 375 mg/m2  Calculated Dose: 693.75 mg                             (In mg/m2, AUC, mg/kg)     Medication: cyclophosphamide (Cytoxan)  Dose: 400 mg/m2  Calculated Dose: 740 mg                             (In mg/m2, AUC, mg/kg)    Medication: doxorubicin (Adriamycin)  Dose: 25 mg/m2  Calculated Dose: 46.25 mg                             (In mg/m2, AUC, mg/kg)    Medication: vincristine (Oncovin)  Dose: 1 mg set dose  Calculated Dose: 1 mg                             (In mg/m2, AUC, mg/kg)      I confirm that this process was performed independently.

## 2025-04-14 ENCOUNTER — PATIENT MESSAGE (OUTPATIENT)
Dept: HEMATOLOGY ONCOLOGY | Facility: MEDICAL CENTER | Age: 75
End: 2025-04-14
Payer: MEDICARE

## 2025-04-15 DIAGNOSIS — N40.1 BENIGN PROSTATIC HYPERPLASIA WITH URINARY OBSTRUCTION: ICD-10-CM

## 2025-04-15 DIAGNOSIS — C85.99 GASTRIC LYMPHOMA (HCC): ICD-10-CM

## 2025-04-15 DIAGNOSIS — N13.8 BENIGN PROSTATIC HYPERPLASIA WITH URINARY OBSTRUCTION: ICD-10-CM

## 2025-04-15 NOTE — PATIENT COMMUNICATION
Spoke with pt's daughter Shereen.  Patient established with [facility], requesting to transfer to [facility].  Patient had medina catheter placed urgently with 1 L urinary retention.  Patient was prescribed keflex 500mg QID x 7 days. Dr. Raza updated and in agreement with POC.

## 2025-04-16 NOTE — Clinical Note
REFERRAL APPROVAL NOTICE         Sent on April 16, 2025                   Andrew Gamez  [address] [address]                   Dear Mr. Gamez,    After a careful review of the medical information and benefit coverage, [facility] has processed your referral. See below for additional details.    If applicable, you must be actively enrolled with your insurance for coverage of the authorized service. If you have any questions regarding your coverage, please contact your insurance directly.    REFERRAL INFORMATION   Referral #:  [id number]  Referred-To Department    Referred-By Provider:  Urology    [facility]      [address]  [address]  [place] [address]  [phone] [address]  [place] [id number] [phone]    Referral Start Date:  04/15/2025  Referral End Date:   04/15/2026             SCHEDULING  If you do not already have an appointment, please call [phone] to make an appointment.     MORE INFORMATION  If you do not already have a [employer] account, sign up at: [email or web address]  You can access your medical information, make appointments, see lab results, billing information, and more.  If you have questions regarding this referral, please contact  the [facility] Referrals department at:             [phone]. Monday - Friday 8:00AM - 5:00PM.     Sincerely,    [facility]

## 2025-04-22 ENCOUNTER — PATIENT OUTREACH (OUTPATIENT)
Dept: ONCOLOGY | Facility: MEDICAL CENTER | Age: 75
End: 2025-04-22
Payer: MEDICARE

## 2025-04-22 NOTE — PROGRESS NOTES
On April 22, 2025, , Flavia Lozano, spoke to pt. via telephone. MIRA Lozano introduced self and explained that she wanted to talk to pt. about some resources available to him as he is coming from out of the area. Pt. reported that he is currently getting a haircut in [place] right now but MIRA Lozano can attempt to call him tomorrow. MIRA Lozano agreed to try and touch base with pt. tomorrow (04/23/2025).

## 2025-04-30 ENCOUNTER — PATIENT OUTREACH (OUTPATIENT)
Dept: ONCOLOGY | Facility: MEDICAL CENTER | Age: 75
End: 2025-04-30
Payer: MEDICARE

## 2025-04-30 NOTE — PROGRESS NOTES
On April 30, 2025, , Flavia Lozano, attempted to reach pt. via telephone. Pt. answered and stated that he is currently in physical therapy. Pt. asked  to call back at a later time. MIRA Lozano will also send an introduction via [employer].

## 2025-04-30 NOTE — PROGRESS NOTES
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  Millicent F et al. Lancet Oncol. 2011;12(5):460-8.  Al-Anahi D, et al. Am J Hematol. 2024;99(2):216-222.    Allergies:  Penicillins     BP (!) 156/89   Pulse 100   Temp 36.9 °C (98.4 °F) (Temporal)   Resp 18   Ht 1.72 m (5' 7.72\")   Wt 71.3 kg (157 lb 3 oz)   SpO2 98%   BMI 24.10 kg/m²  Body surface area is 1.85 meters squared.    Labs 5/1/25:  ANC~ 2590 Plt = 384k   Hgb = 9.3     SCr = 0.69 mg/dL CrCl ~ 88.6 mL/min (min SCr 0.7 used)  AST/ALT/AP = 22/17/102 TBili = 0.2  Uric Acid = 2.2  LDH = 157     03/31/25 15:05   Hepatitis B Surface Antigen Non-Reactive   Hepatitis B Cors Ab,IgM Non-Reactive        Drug Order   (Drug name, dose, route, IV Fluid & volume, frequency, number of doses) Cycle 2   Previous treatment: C1 4/11/25     Medication = rituximab  Base Dose = 375 mg/m2  Calc Dose: Base Dose x 1.85 m² = 693.75 mg  Final Dose = 700 mg  Route = IV  Fluid & Volume =  mL  Admin Duration = see rituximab rate sheet            <10% difference, okay to treat with final dose     Medication = cyclophosphamide  Base Dose = 400 mg/m2  Calc Dose: Base Dose x 1.85 m² = 740 mg  Final Dose = 750 mg  Route = IV  Fluid & Volume =  mL  Admin Duration = Over 30 minutes            <10% difference, okay to treat with final dose     Medication = doxorubicin  Base Dose = 25 mg/m2  Calc Dose:Base Dose x 1.85 m² = 46.25 mg  Final Dose = 46 mg  Route = IV  Fluid & Volume " = 2 mg/mL; 23 mL empty bag  Admin Duration = Over 20 minutes            <10% difference, okay to treat with final dose     Medication = vincristine  Base Dose = 1 mg  Fixed dose; no calc required  Final Dose = 1 mg  Route = IV  Fluid & Volume = NS 25 mL  Admin Duration = Over 5-10 minutes            Fixed dose, okay to treat with final dose       By my signature below, I confirm this process was performed independently with the BSA and all final chemotherapy dosing calculations congruent. I have reviewed the above chemotherapy order and that my calculation of the final dose and BSA (when applicable) corroborate those calculations of the  pharmacist. Discrepancies of 10% or greater in the written dose have been addressed and documented within the EPIC Progress notes.    Caesar McdonoughD

## 2025-05-01 ENCOUNTER — HOSPITAL ENCOUNTER (OUTPATIENT)
Facility: MEDICAL CENTER | Age: 75
End: 2025-05-01
Attending: NURSE PRACTITIONER
Payer: MEDICARE

## 2025-05-01 ENCOUNTER — PHARMACY VISIT (OUTPATIENT)
Dept: PHARMACY | Facility: MEDICAL CENTER | Age: 75
End: 2025-05-01
Payer: MEDICARE

## 2025-05-01 ENCOUNTER — HOSPITAL ENCOUNTER (OUTPATIENT)
Dept: HEMATOLOGY ONCOLOGY | Facility: MEDICAL CENTER | Age: 75
End: 2025-05-01
Attending: NURSE PRACTITIONER
Payer: MEDICARE

## 2025-05-01 VITALS
WEIGHT: 153.2 LBS | OXYGEN SATURATION: 97 % | BODY MASS INDEX: 23.22 KG/M2 | HEIGHT: 68 IN | HEART RATE: 94 BPM | TEMPERATURE: 99.2 F | RESPIRATION RATE: 14 BRPM | SYSTOLIC BLOOD PRESSURE: 126 MMHG | DIASTOLIC BLOOD PRESSURE: 66 MMHG

## 2025-05-01 DIAGNOSIS — C96.A HISTIOCYTIC SARCOMA (HCC): ICD-10-CM

## 2025-05-01 DIAGNOSIS — Z79.899 ENCOUNTER FOR LONG-TERM CURRENT USE OF HIGH RISK MEDICATION: ICD-10-CM

## 2025-05-01 DIAGNOSIS — Z79.899 HIGH RISK MEDICATION USE: ICD-10-CM

## 2025-05-01 DIAGNOSIS — C85.99 GASTRIC LYMPHOMA (HCC): ICD-10-CM

## 2025-05-01 LAB
ALBUMIN SERPL BCP-MCNC: 3.7 G/DL (ref 3.2–4.9)
ALBUMIN/GLOB SERPL: 1.3 G/DL
ALP SERPL-CCNC: 102 U/L (ref 30–99)
ALT SERPL-CCNC: 17 U/L (ref 2–50)
ANION GAP SERPL CALC-SCNC: 11 MMOL/L (ref 7–16)
AST SERPL-CCNC: 22 U/L (ref 12–45)
BASOPHILS # BLD AUTO: 1.2 % (ref 0–1.8)
BASOPHILS # BLD: 0.05 K/UL (ref 0–0.12)
BILIRUB SERPL-MCNC: 0.2 MG/DL (ref 0.1–1.5)
BUN SERPL-MCNC: 27 MG/DL (ref 8–22)
CALCIUM ALBUM COR SERPL-MCNC: 10.2 MG/DL (ref 8.5–10.5)
CALCIUM SERPL-MCNC: 10 MG/DL (ref 8.5–10.5)
CHLORIDE SERPL-SCNC: 105 MMOL/L (ref 96–112)
CO2 SERPL-SCNC: 22 MMOL/L (ref 20–33)
CREAT SERPL-MCNC: 0.69 MG/DL (ref 0.5–1.4)
EOSINOPHIL # BLD AUTO: 0.16 K/UL (ref 0–0.51)
EOSINOPHIL NFR BLD: 3.7 % (ref 0–6.9)
ERYTHROCYTE [DISTWIDTH] IN BLOOD BY AUTOMATED COUNT: 53.4 FL (ref 35.9–50)
GFR SERPLBLD CREATININE-BSD FMLA CKD-EPI: 95 ML/MIN/1.73 M 2
GLOBULIN SER CALC-MCNC: 2.8 G/DL (ref 1.9–3.5)
GLUCOSE SERPL-MCNC: 95 MG/DL (ref 65–99)
HCT VFR BLD AUTO: 29.9 % (ref 42–52)
HGB BLD-MCNC: 9.3 G/DL (ref 14–18)
IMM GRANULOCYTES # BLD AUTO: 0.01 K/UL (ref 0–0.11)
IMM GRANULOCYTES NFR BLD AUTO: 0.2 % (ref 0–0.9)
LDH SERPL L TO P-CCNC: 157 U/L (ref 107–266)
LYMPHOCYTES # BLD AUTO: 0.7 K/UL (ref 1–4.8)
LYMPHOCYTES NFR BLD: 16.2 % (ref 22–41)
MCH RBC QN AUTO: 25.3 PG (ref 27–33)
MCHC RBC AUTO-ENTMCNC: 31.1 G/DL (ref 32.3–36.5)
MCV RBC AUTO: 81.3 FL (ref 81.4–97.8)
MONOCYTES # BLD AUTO: 0.8 K/UL (ref 0–0.85)
MONOCYTES NFR BLD AUTO: 18.6 % (ref 0–13.4)
NEUTROPHILS # BLD AUTO: 2.59 K/UL (ref 1.82–7.42)
NEUTROPHILS NFR BLD: 60.1 % (ref 44–72)
NRBC # BLD AUTO: 0 K/UL
NRBC BLD-RTO: 0 /100 WBC (ref 0–0.2)
PHOSPHATE SERPL-MCNC: 3 MG/DL (ref 2.5–4.5)
PLATELET # BLD AUTO: 384 K/UL (ref 164–446)
PMV BLD AUTO: 9.2 FL (ref 9–12.9)
POTASSIUM SERPL-SCNC: 4.5 MMOL/L (ref 3.6–5.5)
PROT SERPL-MCNC: 6.5 G/DL (ref 6–8.2)
RBC # BLD AUTO: 3.68 M/UL (ref 4.7–6.1)
SODIUM SERPL-SCNC: 138 MMOL/L (ref 135–145)
URATE SERPL-MCNC: 2.2 MG/DL (ref 2.5–8.3)
WBC # BLD AUTO: 4.3 K/UL (ref 4.8–10.8)

## 2025-05-01 PROCEDURE — 99214 OFFICE O/P EST MOD 30 MIN: CPT | Performed by: NURSE PRACTITIONER

## 2025-05-01 PROCEDURE — 85025 COMPLETE CBC W/AUTO DIFF WBC: CPT

## 2025-05-01 PROCEDURE — 80053 COMPREHEN METABOLIC PANEL: CPT

## 2025-05-01 PROCEDURE — RXMED WILLOW AMBULATORY MEDICATION CHARGE: Performed by: STUDENT IN AN ORGANIZED HEALTH CARE EDUCATION/TRAINING PROGRAM

## 2025-05-01 PROCEDURE — 84100 ASSAY OF PHOSPHORUS: CPT

## 2025-05-01 PROCEDURE — 36415 COLL VENOUS BLD VENIPUNCTURE: CPT | Performed by: NURSE PRACTITIONER

## 2025-05-01 PROCEDURE — 99212 OFFICE O/P EST SF 10 MIN: CPT | Performed by: NURSE PRACTITIONER

## 2025-05-01 PROCEDURE — 84550 ASSAY OF BLOOD/URIC ACID: CPT

## 2025-05-01 PROCEDURE — 83615 LACTATE (LD) (LDH) ENZYME: CPT

## 2025-05-01 RX ORDER — 0.9 % SODIUM CHLORIDE 0.9 %
3 VIAL (ML) INJECTION PRN
Status: CANCELLED | OUTPATIENT
Start: 2025-05-14

## 2025-05-01 RX ORDER — DIPHENHYDRAMINE HYDROCHLORIDE 50 MG/ML
25 INJECTION, SOLUTION INTRAMUSCULAR; INTRAVENOUS PRN
Status: CANCELLED | OUTPATIENT
Start: 2025-05-14 | End: 2025-05-16

## 2025-05-01 RX ORDER — METHYLPREDNISOLONE SODIUM SUCCINATE 125 MG/2ML
125 INJECTION, POWDER, LYOPHILIZED, FOR SOLUTION INTRAMUSCULAR; INTRAVENOUS PRN
Status: CANCELLED | OUTPATIENT
Start: 2025-05-14

## 2025-05-01 RX ORDER — 0.9 % SODIUM CHLORIDE 0.9 %
10 VIAL (ML) INJECTION PRN
Status: CANCELLED | OUTPATIENT
Start: 2025-05-14

## 2025-05-01 RX ORDER — SODIUM CHLORIDE 9 MG/ML
1000 INJECTION, SOLUTION INTRAVENOUS PRN
Status: CANCELLED | OUTPATIENT
Start: 2025-05-14

## 2025-05-01 RX ORDER — MEPERIDINE HYDROCHLORIDE 25 MG/ML
25 INJECTION INTRAMUSCULAR; INTRAVENOUS; SUBCUTANEOUS PRN
Status: CANCELLED | OUTPATIENT
Start: 2025-05-14

## 2025-05-01 RX ORDER — SODIUM CHLORIDE 9 MG/ML
500 INJECTION, SOLUTION INTRAVENOUS ONCE
Status: CANCELLED | OUTPATIENT
Start: 2025-05-14 | End: 2025-05-14

## 2025-05-01 RX ORDER — SODIUM CHLORIDE 9 MG/ML
500 INJECTION, SOLUTION INTRAVENOUS ONCE
Status: CANCELLED | OUTPATIENT
Start: 2025-05-14

## 2025-05-01 RX ORDER — LORAZEPAM 0.5 MG/1
0.5 TABLET ORAL PRN
Status: CANCELLED | OUTPATIENT
Start: 2025-05-14

## 2025-05-01 RX ORDER — ONDANSETRON 8 MG/1
8 TABLET, ORALLY DISINTEGRATING ORAL PRN
Status: CANCELLED | OUTPATIENT
Start: 2025-05-14

## 2025-05-01 RX ORDER — EPINEPHRINE 1 MG/ML(1)
0.5 AMPUL (ML) INJECTION PRN
Status: CANCELLED | OUTPATIENT
Start: 2025-05-14

## 2025-05-01 RX ORDER — ACETAMINOPHEN 325 MG/1
650 TABLET ORAL ONCE
Status: CANCELLED | OUTPATIENT
Start: 2025-05-14 | End: 2025-05-14

## 2025-05-01 RX ORDER — DIPHENHYDRAMINE HYDROCHLORIDE 50 MG/ML
25 INJECTION, SOLUTION INTRAMUSCULAR; INTRAVENOUS ONCE
Status: CANCELLED | OUTPATIENT
Start: 2025-05-14 | End: 2025-05-14

## 2025-05-01 RX ORDER — LORAZEPAM 2 MG/ML
0.5 INJECTION INTRAMUSCULAR PRN
Status: CANCELLED | OUTPATIENT
Start: 2025-05-14

## 2025-05-01 RX ORDER — PREDNISONE 20 MG/1
70 TABLET ORAL DAILY
Qty: 30 TABLET | Refills: 0 | Status: SHIPPED | OUTPATIENT
Start: 2025-05-01

## 2025-05-01 RX ORDER — ACETAMINOPHEN 325 MG/1
650 TABLET ORAL PRN
Status: CANCELLED | OUTPATIENT
Start: 2025-05-14

## 2025-05-01 RX ORDER — SODIUM CHLORIDE 9 MG/ML
INJECTION, SOLUTION INTRAVENOUS CONTINUOUS
Status: CANCELLED | OUTPATIENT
Start: 2025-05-14

## 2025-05-01 RX ORDER — ONDANSETRON 2 MG/ML
8 INJECTION INTRAMUSCULAR; INTRAVENOUS PRN
Status: CANCELLED | OUTPATIENT
Start: 2025-05-14

## 2025-05-01 RX ORDER — 0.9 % SODIUM CHLORIDE 0.9 %
10 VIAL (ML) INJECTION PRN
Status: CANCELLED | OUTPATIENT
Start: 2025-05-13

## 2025-05-01 RX ORDER — DIPHENHYDRAMINE HYDROCHLORIDE 50 MG/ML
25 INJECTION, SOLUTION INTRAMUSCULAR; INTRAVENOUS PRN
Status: CANCELLED | OUTPATIENT
Start: 2025-05-14

## 2025-05-01 RX ORDER — 0.9 % SODIUM CHLORIDE 0.9 %
VIAL (ML) INJECTION PRN
Status: CANCELLED | OUTPATIENT
Start: 2025-05-14

## 2025-05-01 RX ORDER — CHOLECALCIFEROL (VITAMIN D3) 25 MCG
CAPSULE ORAL
COMMUNITY
Start: 2025-04-07

## 2025-05-01 RX ORDER — ALBUTEROL SULFATE 5 MG/ML
2.5 SOLUTION RESPIRATORY (INHALATION) PRN
Status: CANCELLED | OUTPATIENT
Start: 2025-05-14

## 2025-05-01 RX ORDER — OMEGA-3/DHA/EPA/FISH OIL 300-1000MG
CAPSULE ORAL
COMMUNITY
Start: 2025-04-07

## 2025-05-01 RX ORDER — 0.9 % SODIUM CHLORIDE 0.9 %
3 VIAL (ML) INJECTION PRN
Status: CANCELLED | OUTPATIENT
Start: 2025-05-13

## 2025-05-01 RX ORDER — MEPERIDINE HYDROCHLORIDE 25 MG/ML
25 INJECTION INTRAMUSCULAR; INTRAVENOUS; SUBCUTANEOUS PRN
Status: CANCELLED | OUTPATIENT
Start: 2025-05-14 | End: 2025-05-16

## 2025-05-01 RX ORDER — ACETAMINOPHEN 325 MG/1
650 TABLET ORAL PRN
Status: CANCELLED | OUTPATIENT
Start: 2025-05-14 | End: 2025-05-16

## 2025-05-01 RX ORDER — PROCHLORPERAZINE MALEATE 10 MG
10 TABLET ORAL EVERY 6 HOURS PRN
Status: CANCELLED | OUTPATIENT
Start: 2025-05-14

## 2025-05-01 RX ORDER — HYDROCORTISONE SODIUM SUCCINATE 100 MG/2ML
100 INJECTION INTRAMUSCULAR; INTRAVENOUS PRN
Status: CANCELLED | OUTPATIENT
Start: 2025-05-14 | End: 2025-05-16

## 2025-05-01 RX ORDER — PALONOSETRON 0.05 MG/ML
0.25 INJECTION, SOLUTION INTRAVENOUS ONCE
Status: CANCELLED | OUTPATIENT
Start: 2025-05-14 | End: 2025-05-14

## 2025-05-01 RX ORDER — 0.9 % SODIUM CHLORIDE 0.9 %
VIAL (ML) INJECTION PRN
Status: CANCELLED | OUTPATIENT
Start: 2025-05-13

## 2025-05-01 ASSESSMENT — ENCOUNTER SYMPTOMS
INSOMNIA: 1
CHILLS: 0
PALPITATIONS: 0
NAUSEA: 1
WEIGHT LOSS: 1
BLOOD IN STOOL: 0
FEVER: 0
DIARRHEA: 1
DIZZINESS: 1
MYALGIAS: 0
HEADACHES: 0
WHEEZING: 0
TINGLING: 1
SHORTNESS OF BREATH: 1
VOMITING: 0
COUGH: 0
CONSTIPATION: 1

## 2025-05-01 ASSESSMENT — FIBROSIS 4 INDEX: FIB4 SCORE: 1.13

## 2025-05-01 NOTE — PROGRESS NOTES
Arya Gamez is a 75 y.o. male here for a non-provider visit for: Lab Draws  on 5/1/2025 at 4:20 PM    Procedure Performed: Venipuncture     Anatomical site: Right Antecubital Area (AC)    Equipment used: 23g butterfly    Labs drawn: CBC w/diff, CMP, LDH, and Phosphorus and Uric Acid    Ordering Provider: ANGELA Dial    Mao By: Cami Masters, Med Ass't

## 2025-05-01 NOTE — PROGRESS NOTES
Subjective     Andrew Gamez is a 75 y.o. male who presents with Lymphoma ([place]/pre chemo)            HPI  Mr. 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  He is accompanied by his daughter for today's visit.    Patient was in his usual state of health until he underwent total knee arthroplasty on 3/4/25. He was taking aspirin for DVT prophylaxis as well as ibuprofen for pain and developed severe anemia with gastric ulcers. He underwent EGD on 3/15/25 at [facility] which showed large gastric ulcers. Patient was transferred to [facility], EGD repeated 3/18/25 showing 6 cm ulcer and numerous surrounding ulcers in the stomach, biopsy was completed with pathology ultimately confirming malignancy. PET completed 3/31/25 showed wall thickening of the gastric fundus with corresponding mild increase activity likely related to patient's known gastric malignancy, small curvilinear focus of increased activity in the right mid abdomen, subcutaneous nodule with eccentric increased activity in the right gluteal region, likely sebaceous cyst; no other evidence of metastatic disease.  Bone marrow biopsy was completed on 4/8/25 normal male karyotype 46, XY [20]; no evidence of involvement by B-cell lymphoproliferative process.  Patient initiated mini R-CHOP 4/11/25.    Patient was evaluated per ED on 4/14 with urinary retention noted, evacuated per catheter, initiated on Flomax, he has self cath materials on hand from h/o prostate surgery.  He is needing to self cath intermittently to ensure complete evacuation but symptoms are improved overall.  Nausea is well-managed with Zyprexa days 1-4 following treatment.  Constipation is well-managed with milk of magnesia daily, is encouraged to use MiraLAX as it is less stimulating.  He is otherwise tolerating  treatment very well, denies B symptoms, and is at his baseline.      Review of Systems   Constitutional:  Positive for weight loss (down 2). Negative for chills, fever and malaise/fatigue (usual chores, some increase in activity).   Respiratory:  Positive for shortness of breath (with activity). Negative for cough and wheezing.    Cardiovascular:  Positive for leg swelling (RLE r/t knee). Negative for chest pain and palpitations.   Gastrointestinal:  Positive for constipation (managing with MOM daily and enema), diarrhea and nausea (Zyprexa days 1-4 of treatment). Negative for blood in stool, melena and vomiting.   Genitourinary:  Negative for dysuria.        Urinary retention, ED visit, evacuated with cath, now on Flomax; has self cath materials and completed PRN   Musculoskeletal:  Positive for joint pain (s/p R knee replacement). Negative for myalgias.   Neurological:  Positive for dizziness and tingling (h/o carpel tunnel). Negative for headaches.   Psychiatric/Behavioral:  The patient has insomnia (consistent with baseline).      Allergies   Allergen Reactions    Penicillins      Childhood reaction         Current Outpatient Medications on File Prior to Encounter   Medication Sig Dispense Refill    docosahexanoic acid (OMEGA 3 FA) 1000 MG Cap       Cholecalciferol (VITAMIN D-3) 25 MCG (1000 UT) Cap       allopurinol (ZYLOPRIM) 300 MG Tab Take 1 Tablet by mouth every day. 30 Tablet 3    ondansetron (ZOFRAN) 4 MG Tab tablet Take 1 Tablet by mouth every four hours as needed for Nausea/Vomiting. 20 Tablet 3    prochlorperazine (COMPAZINE) 10 MG Tab Take 1 Tablet by mouth every 6 hours as needed for Nausea/Vomiting. 30 Tablet 3    lidocaine-prilocaine (EMLA) 2.5-2.5 % Cream Apply to port 1 hour prior to access of port and cover with plastic wrap. 30 g 3    OLANZapine (ZYPREXA) 5 MG Tab Take 1 tablet by mouth nightly on days 1-4 of each chemotherapy cycle. 4 Tablet 3    B Complex Vitamins (VITAMIN B COMPLEX PO) Take   "by mouth.      Saccharomyces boulardii (PROBIOTIC) 250 MG Cap Take  by mouth.      tamsulosin (FLOMAX) 0.4 MG capsule Take 2 Capsules by mouth every day. 30 Capsule 0    omeprazole (PRILOSEC) 40 MG delayed-release capsule Take 1 Capsule by mouth 2 times a day for 120 days. 120 Capsule 1    atorvastatin (LIPITOR) 10 MG Tab       Glucosamine HCl (GLUCOSAMINE PO) Take 1 Dose by mouth every day.      acetaminophen (TYLENOL) 500 MG Tab Take 500-1,000 mg by mouth every 6 hours as needed.      oxycodone 5 MG capsule Take  by mouth.       No current facility-administered medications on file prior to encounter.              Objective     /66 (BP Location: Left arm, Patient Position: Sitting, BP Cuff Size: Adult)   Pulse 94   Temp 37.3 °C (99.2 °F) (Temporal)   Resp 14   Ht 1.72 m (5' 7.72\")   Wt 69.5 kg (153 lb 3.2 oz)   SpO2 97%   BMI 23.49 kg/m²      Physical Exam  Vitals reviewed.   Constitutional:       General: He is not in acute distress.     Appearance: He is well-developed. He is not diaphoretic.   HENT:      Head: Normocephalic and atraumatic.   Eyes:      General: No scleral icterus.        Right eye: No discharge.         Left eye: No discharge.   Cardiovascular:      Rate and Rhythm: Normal rate and regular rhythm.      Heart sounds: Normal heart sounds. No murmur heard.     No friction rub. No gallop.   Pulmonary:      Effort: Pulmonary effort is normal. No respiratory distress.      Breath sounds: Normal breath sounds. No wheezing.   Abdominal:      General: There is no distension.      Palpations: Abdomen is soft.      Tenderness: There is no abdominal tenderness.   Musculoskeletal:         General: Normal range of motion.      Cervical back: Normal range of motion.   Skin:     General: Skin is warm and dry.      Coloration: Skin is not pale.      Findings: No erythema or rash.   Neurological:      Mental Status: He is alert and oriented to person, place, and time.   Psychiatric:         Behavior: " Behavior normal.       Hospital Outpatient Visit on 05/01/2025   Component Date Value Ref Range Status    WBC 05/01/2025 4.3 (L)  4.8 - 10.8 K/uL Final    RBC 05/01/2025 3.68 (L)  4.70 - 6.10 M/uL Final    Hemoglobin 05/01/2025 9.3 (L)  14.0 - 18.0 g/dL Final    Hematocrit 05/01/2025 29.9 (L)  42.0 - 52.0 % Final    MCV 05/01/2025 81.3 (L)  81.4 - 97.8 fL Final    MCH 05/01/2025 25.3 (L)  27.0 - 33.0 pg Final    MCHC 05/01/2025 31.1 (L)  32.3 - 36.5 g/dL Final    RDW 05/01/2025 53.4 (H)  35.9 - 50.0 fL Final    Platelet Count 05/01/2025 384  164 - 446 K/uL Final    MPV 05/01/2025 9.2  9.0 - 12.9 fL Final    Neutrophils-Polys 05/01/2025 60.10  44.00 - 72.00 % Final    Lymphocytes 05/01/2025 16.20 (L)  22.00 - 41.00 % Final    Monocytes 05/01/2025 18.60 (H)  0.00 - 13.40 % Final    Eosinophils 05/01/2025 3.70  0.00 - 6.90 % Final    Basophils 05/01/2025 1.20  0.00 - 1.80 % Final    Immature Granulocytes 05/01/2025 0.20  0.00 - 0.90 % Final    Nucleated RBC 05/01/2025 0.00  0.00 - 0.20 /100 WBC Final    Neutrophils (Absolute) 05/01/2025 2.59  1.82 - 7.42 K/uL Final    Includes immature neutrophils, if present.    Lymphs (Absolute) 05/01/2025 0.70 (L)  1.00 - 4.80 K/uL Final    Monos (Absolute) 05/01/2025 0.80  0.00 - 0.85 K/uL Final    Eos (Absolute) 05/01/2025 0.16  0.00 - 0.51 K/uL Final    Baso (Absolute) 05/01/2025 0.05  0.00 - 0.12 K/uL Final    Immature Granulocytes (abs) 05/01/2025 0.01  0.00 - 0.11 K/uL Final    NRBC (Absolute) 05/01/2025 0.00  K/uL Final    Sodium 05/01/2025 138  135 - 145 mmol/L Final    Potassium 05/01/2025 4.5  3.6 - 5.5 mmol/L Final    Chloride 05/01/2025 105  96 - 112 mmol/L Final    Co2 05/01/2025 22  20 - 33 mmol/L Final    Anion Gap 05/01/2025 11.0  7.0 - 16.0 Final    Glucose 05/01/2025 95  65 - 99 mg/dL Final    Bun 05/01/2025 27 (H)  8 - 22 mg/dL Final    Creatinine 05/01/2025 0.69  0.50 - 1.40 mg/dL Final    Calcium 05/01/2025 10.0  8.5 - 10.5 mg/dL Final    Correct Calcium  05/01/2025 10.2  8.5 - 10.5 mg/dL Final    AST(SGOT) 05/01/2025 22  12 - 45 U/L Final    ALT(SGPT) 05/01/2025 17  2 - 50 U/L Final    Alkaline Phosphatase 05/01/2025 102 (H)  30 - 99 U/L Final    Total Bilirubin 05/01/2025 0.2  0.1 - 1.5 mg/dL Final    Albumin 05/01/2025 3.7  3.2 - 4.9 g/dL Final    Total Protein 05/01/2025 6.5  6.0 - 8.2 g/dL Final    Globulin 05/01/2025 2.8  1.9 - 3.5 g/dL Final    A-G Ratio 05/01/2025 1.3  g/dL Final    LDH Total 05/01/2025 157  107 - 266 U/L Final    Uric Acid 05/01/2025 2.2 (L)  2.5 - 8.3 mg/dL Final    Phosphorus 05/01/2025 3.0  2.5 - 4.5 mg/dL Final    GFR (CKD-EPI) 05/01/2025 95  >60 mL/min/1.73 m 2 Final    Comment: Estimated Glomerular Filtration Rate is calculated using  race neutral CKD-EPI 2021 equation per NKF-ASN recommendations.                Assessment & Plan     1. Gastric lymphoma (HCC)  CBC WITH DIFFERENTIAL    Comp Metabolic Panel    LDH    PHOSPHORUS    URIC ACID      2. Encounter for long-term current use of high risk medication          1.  Gastric lymphoma: Diagnosed 3/18/25; initiated minim R-CHOP 4/11/25    Patient is tolerating treatment fairly well.  CBC, CMP, LDH, Phos, uric acid have been evaluated and found to be within acceptable limits.  Patient will proceed with cycle 2 of treatment and return in approximately 3 weeks for evaluation prior to cycle 3 (of 3), sooner as needed.    The patient verbalized agreement and understanding of current plan. All questions and concerns were addressed at time of visit.    Please note that this dictation was created using voice recognition software. I have made every reasonable attempt to correct obvious errors, but I expect that there are errors of grammar and possibly content that I did not discover before finalizing the note.

## 2025-05-01 NOTE — Clinical Note
Dr. Raza, I ordered a PET for 4 weeks after C3 (of 3) - let Cami know if you don't want that   Cami, PET is ordered - thank you

## 2025-05-02 ENCOUNTER — OUTPATIENT INFUSION SERVICES (OUTPATIENT)
Dept: ONCOLOGY | Facility: MEDICAL CENTER | Age: 75
End: 2025-05-02
Attending: STUDENT IN AN ORGANIZED HEALTH CARE EDUCATION/TRAINING PROGRAM
Payer: MEDICARE

## 2025-05-02 VITALS
WEIGHT: 157.19 LBS | HEART RATE: 100 BPM | SYSTOLIC BLOOD PRESSURE: 156 MMHG | DIASTOLIC BLOOD PRESSURE: 89 MMHG | BODY MASS INDEX: 23.82 KG/M2 | TEMPERATURE: 98.4 F | RESPIRATION RATE: 18 BRPM | OXYGEN SATURATION: 98 % | HEIGHT: 68 IN

## 2025-05-02 DIAGNOSIS — C85.99 GASTRIC LYMPHOMA (HCC): ICD-10-CM

## 2025-05-02 PROCEDURE — 700111 HCHG RX REV CODE 636 W/ 250 OVERRIDE (IP): Performed by: STUDENT IN AN ORGANIZED HEALTH CARE EDUCATION/TRAINING PROGRAM

## 2025-05-02 PROCEDURE — 96411 CHEMO IV PUSH ADDL DRUG: CPT

## 2025-05-02 PROCEDURE — 96367 TX/PROPH/DG ADDL SEQ IV INF: CPT

## 2025-05-02 PROCEDURE — A9270 NON-COVERED ITEM OR SERVICE: HCPCS | Performed by: STUDENT IN AN ORGANIZED HEALTH CARE EDUCATION/TRAINING PROGRAM

## 2025-05-02 PROCEDURE — 96375 TX/PRO/DX INJ NEW DRUG ADDON: CPT

## 2025-05-02 PROCEDURE — 700102 HCHG RX REV CODE 250 W/ 637 OVERRIDE(OP): Performed by: STUDENT IN AN ORGANIZED HEALTH CARE EDUCATION/TRAINING PROGRAM

## 2025-05-02 PROCEDURE — 96417 CHEMO IV INFUS EACH ADDL SEQ: CPT

## 2025-05-02 PROCEDURE — A4212 NON CORING NEEDLE OR STYLET: HCPCS

## 2025-05-02 PROCEDURE — 96415 CHEMO IV INFUSION ADDL HR: CPT

## 2025-05-02 PROCEDURE — 96413 CHEMO IV INFUSION 1 HR: CPT

## 2025-05-02 PROCEDURE — 700105 HCHG RX REV CODE 258: Performed by: STUDENT IN AN ORGANIZED HEALTH CARE EDUCATION/TRAINING PROGRAM

## 2025-05-02 RX ORDER — PALONOSETRON 0.05 MG/ML
0.25 INJECTION, SOLUTION INTRAVENOUS ONCE
Status: COMPLETED | OUTPATIENT
Start: 2025-05-02 | End: 2025-05-02

## 2025-05-02 RX ORDER — SODIUM CHLORIDE 9 MG/ML
500 INJECTION, SOLUTION INTRAVENOUS ONCE
Status: COMPLETED | OUTPATIENT
Start: 2025-05-02 | End: 2025-05-02

## 2025-05-02 RX ORDER — ACETAMINOPHEN 325 MG/1
650 TABLET ORAL ONCE
Status: COMPLETED | OUTPATIENT
Start: 2025-05-02 | End: 2025-05-02

## 2025-05-02 RX ORDER — DIPHENHYDRAMINE HYDROCHLORIDE 50 MG/ML
25 INJECTION, SOLUTION INTRAMUSCULAR; INTRAVENOUS ONCE
Status: COMPLETED | OUTPATIENT
Start: 2025-05-02 | End: 2025-05-02

## 2025-05-02 RX ADMIN — ACETAMINOPHEN 650 MG: 325 TABLET ORAL at 07:45

## 2025-05-02 RX ADMIN — SODIUM CHLORIDE 700 MG: 9 INJECTION, SOLUTION INTRAVENOUS at 08:48

## 2025-05-02 RX ADMIN — PREDNISONE 73.5 MG: 50 TABLET ORAL at 07:30

## 2025-05-02 RX ADMIN — PALONOSETRON HYDROCHLORIDE 0.25 MG: 0.25 INJECTION INTRAVENOUS at 07:33

## 2025-05-02 RX ADMIN — SODIUM CHLORIDE 500 ML: 9 INJECTION, SOLUTION INTRAVENOUS at 12:25

## 2025-05-02 RX ADMIN — SODIUM CHLORIDE 500 ML: 9 INJECTION, SOLUTION INTRAVENOUS at 08:04

## 2025-05-02 RX ADMIN — DOXORUBICIN HYDROCHLORIDE 46 MG: 2 INJECTION, SOLUTION INTRAVENOUS at 12:02

## 2025-05-02 RX ADMIN — CYCLOPHOSPHAMIDE 750 MG: 200 INJECTION, SOLUTION INTRAVENOUS at 11:29

## 2025-05-02 RX ADMIN — FOSAPREPITANT 150 MG: 150 INJECTION, POWDER, LYOPHILIZED, FOR SOLUTION INTRAVENOUS at 07:33

## 2025-05-02 RX ADMIN — DIPHENHYDRAMINE HYDROCHLORIDE 25 MG: 50 INJECTION INTRAMUSCULAR; INTRAVENOUS at 07:33

## 2025-05-02 RX ADMIN — VINCRISTINE SULFATE 1 MG: 1 INJECTION, SOLUTION INTRAVENOUS at 12:30

## 2025-05-02 ASSESSMENT — FIBROSIS 4 INDEX: FIB4 SCORE: 1.07

## 2025-05-02 NOTE — PROGRESS NOTES
Andrew presents to infusion for cycle 2 of mini- R-CHOP for lymphoma. He denies having any new or acute complaints today, reports tolerating past treatments well.  Port accessed using sterile technique, flushed with NS with positive blood return.     Labs reviewed by RN, within parameters for treatment today.     Patient confirms taking 100mg PO prednisone and his tylenol today prior to arrival.  Pre-treatment meds and pre-hydration given as ordered.     Rituximab given at subsequent rate and titrated per MAR.  Cyclophosphamide given over 1 hour.  Doxorubicin given over 20 minutes with positive blood return verified before and after administration.  Vincristine given over 10 minutes.   Post hydration infused over 30 minutes.  Patient tolerated all well with no adverse effects.     Port flushed post infusion with positive blood return and deaccessed per policy, site covered with band aid. Patient left in stable condition, knows when to return for next appointment.

## 2025-05-02 NOTE — PROGRESS NOTES
Chemotherapy Verification - SECONDARY RN       Height = 172 cm  Weight = 71.3 kg  BSA = 1.85 m2       Medication: Ruxience  Dose: 375 mg/m2  Calculated Dose: 693.75 mg                             (In mg/m2, AUC, mg/kg)     Medication: Cytoxan  Dose: 400 mg/m2  Calculated Dose: 740 mg                             (In mg/m2, AUC, mg/kg)    Medication: Doxorubicin  Dose: 25 mg/m2  Calculated Dose: 46.25 mg                             (In mg/m2, AUC, mg/kg)    Medication: Vincristine  Dose: 1 mg (Set dose)  Calculated Dose: 1 mg                             (In mg/m2, AUC, mg/kg)      I confirm that this process was performed independently.

## 2025-05-02 NOTE — PROGRESS NOTES
Subjective  Arya Gamez is a 75 y.o. male who presents today for LUTS and microscopic hematuria 3-5 RBC on 25. Had TURP in  then was reoperated for DVIU. Currently voiding up to 5 times at night. Went to ED for AUR one month ago for >1L. Was removed the next day. Two weeks ago had medina placed and removed. Given catheters for CIC to keep stricture open. PVR today 145cc.     Has B cell lymphoma, diagnosed due to bleeding following right knee replacement. Had ex lap for perforated gastric ulcer.         Family History   Problem Relation Age of Onset    Cancer Maternal Grandfather        Social History     Socioeconomic History    Marital status: Single   Tobacco Use    Smoking status: Former     Current packs/day: 0.00     Average packs/day: 1.5 packs/day for 10.0 years (15.0 ttl pk-yrs)     Types: Cigarettes     Start date:      Quit date:      Years since quittin.3    Smokeless tobacco: Former     Types: Snuff     Quit date:    Vaping Use    Vaping status: Never Used   Substance and Sexual Activity    Alcohol use: Not Currently    Drug use: Not Currently   Social History Narrative    Patient lives in [place]. Daughter lives in [place]. Retired, used to do a number of jobs, most of which required physical labor.      Social Drivers of Health     Transportation Needs: No Transportation Needs (2025)    Received from [facility] (and [employer] [place], [place], and [place] prior to 2021)    Transportation Needs     Patient needs follow up regarding:: 1   Intimate Partner Violence: Not At Risk (2025)    Received from [facility] (and [employer] [facility] prior to 2021)    Feeling Safe      Are you in a relationship with someone who hurts you emotionally and/or physically?: No   Housing Stability: Low Risk  (3/17/2025)    Housing Stability Vital Sign     Unable to Pay for Housing in the Last Year: No     Number  of Times Moved in the Last Year: 0     Homeless in the Last Year: No       Past Surgical History:   Procedure Laterality Date    [place] UPPER GI ENDOSCOPY,DIAGNOSIS N/A 3/18/2025    Procedure: GASTROSCOPY WITH BIOPSY;  Surgeon: Gricelda Coombs M.D.;  Location: SURGERY SAME DAY [facility];  Service: Gastroenterology    TURP BUTTON N/A 6/11/2021    Procedure: TURP, USING BUTTON ELECTRODE-BIPOLAR.;  Surgeon: Massimo Gee M.D.;  Location: SURGERY [facility];  Service: Urology    HERNIA REPAIR  2003    MENISCUS REPAIR Left 1978       Past Medical History:   Diagnosis Date    Anesthesia     Arthritis     knees, wrists    Cancer (HCC)     Delayed emergence from general anesthesia     Dental disorder     upper partial    Disorder of thyroid     High cholesterol     Pain     Urinary incontinence        Current Outpatient Medications   Medication Sig    predniSONE (DELTASONE) 20 MG Tab Take 3.5 Tablets by mouth every day. on days 1-5 of each treatment cycle.    docosahexanoic acid (OMEGA 3 FA) 1000 MG Cap     Cholecalciferol (VITAMIN D-3) 25 MCG (1000 UT) Cap     allopurinol (ZYLOPRIM) 300 MG Tab Take 1 Tablet by mouth every day.    oxycodone 5 MG capsule Take  by mouth.    ondansetron (ZOFRAN) 4 MG Tab tablet Take 1 Tablet by mouth every four hours as needed for Nausea/Vomiting.    prochlorperazine (COMPAZINE) 10 MG Tab Take 1 Tablet by mouth every 6 hours as needed for Nausea/Vomiting.    lidocaine-prilocaine (EMLA) 2.5-2.5 % Cream Apply to port 1 hour prior to access of port and cover with plastic wrap.    OLANZapine (ZYPREXA) 5 MG Tab Take 1 tablet by mouth nightly on days 1-4 of each chemotherapy cycle.    B Complex Vitamins (VITAMIN B COMPLEX PO) Take  by mouth.    Saccharomyces boulardii (PROBIOTIC) 250 MG Cap Take  by mouth.    tamsulosin (FLOMAX) 0.4 MG capsule Take 2 Capsules by mouth every day.    omeprazole (PRILOSEC) 40 MG delayed-release capsule Take 1 Capsule by mouth 2 times a day for 120 days.    atorvastatin  "(LIPITOR) 10 MG Tab     Glucosamine HCl (GLUCOSAMINE PO) Take 1 Dose by mouth every day.    acetaminophen (TYLENOL) 500 MG Tab Take 500-1,000 mg by mouth every 6 hours as needed.       Allergies   Allergen Reactions    Penicillins      Childhood reaction       Objective  There were no vitals taken for this visit.  Physical Exam  Constitutional:       Appearance: Normal appearance. He is normal weight.   Neurological:      Mental Status: He is alert.         Labs:   POCT UA No results found for: \"POCCOLOR\", \"POCAPPEAR\", \"POCLEUKEST\", \"POCNITRITE\", \"POCUROBILIGE\", \"POCPROTEIN\", \"POCURPH\", \"POCBLOOD\", \"POCSPGRV\", \"POCKETONES\", \"POCBILIRUBIN\", \"POCGLUCUA\"   CBC   Lab Results   Component Value Date/Time    WBC 4.3 (L) 05/01/2025 1620    RBC 3.68 (L) 05/01/2025 1620    HEMOGLOBIN 9.3 (L) 05/01/2025 1620    HEMATOCRIT 29.9 (L) 05/01/2025 1620    MCV 81.3 (L) 05/01/2025 1620    MCH 25.3 (L) 05/01/2025 1620    MCHC 31.1 (L) 05/01/2025 1620    RDW 53.4 (H) 05/01/2025 1620    MPV 9.2 05/01/2025 1620    LYMPHOCYTES 16.20 (L) 05/01/2025 1620    LYMPHS 0.70 (L) 05/01/2025 1620    MONOCYTES 18.60 (H) 05/01/2025 1620    MONOS 0.80 05/01/2025 1620    EOSINOPHILS 3.70 05/01/2025 1620    EOS 0.16 05/01/2025 1620    BASOPHILS 1.20 05/01/2025 1620    BASO 0.05 05/01/2025 1620    NRBC 0.00 05/01/2025 1620       BMP   Lab Results   Component Value Date/Time    SODIUM 138 05/01/2025 1620    POTASSIUM 4.5 05/01/2025 1620    CHLORIDE 105 05/01/2025 1620    CO2 22 05/01/2025 1620    GLUCOSE 95 05/01/2025 1620    BUN 27 (H) 05/01/2025 1620    CREATININE 0.69 05/01/2025 1620    CALCIUM 10.0 05/01/2025 1620       Imaging:   None relevant      Assessment & Plan  Likely urethral stricture recurrence. Will start on daily CIC to avoid an ED visit. Will set up with Dr. Queen for cysto and assessment.     Salas Kolb M.D., F.A.C.S.  Urologic Oncology  Vice-Chair, Department of Surgery  Counts include 234 beds at the [facility]"

## 2025-05-02 NOTE — PROGRESS NOTES
"Pharmacy Chemotherapy calculation:    DX: Gastric B-cell lymphoma, marginal zone lymphoma with transformation to DLBCL    Cycle 2  Previous treatment = C1 on 4/11/25    Regimen: Mini-RCHOP    *Dosing Reference*  Cyclophosphamide 400 mg/m2 IV over 30 minutes on Day 1  Doxorubicin 25 mg/m2 IV push on Day 1  Vincristine 1 mg IV over 5-10 minutes on Day 1  Prednisone 40 mg/m2 PO daily on Days 1-5  Rituximab 375 mg/m2 IV on Day 1  21-day cycle for 6 cycles  NCCN Guidelines for B-Cell Lymphomas V.2.2025.  Millicent F, et al. Lancet Oncol. 2011;12(5):460-8.  Al-Anahi D, et al. Am J Hematol. 2024;99(2):216-222.    Allergies: Penicillins   BP (!) 156/89   Pulse 100   Temp 36.9 °C (98.4 °F) (Temporal)   Resp 18   Ht 1.72 m (5' 7.72\")   Wt 71.3 kg (157 lb 3 oz)   SpO2 98%   BMI 24.10 kg/m²   Body surface area is 1.85 meters squared.    Labs 5/1/25  ANC~ 2590 Plt = 384k   Hgb = 9.3     SCr = 0.69 mg/dL CrCl ~ 88.7 mL/min (minimum SCr of 0.7)   LFT's = 22/17/102 TBili = 0.2      03/31/25 15:05   Hepatitis B Surface Antigen Non-Reactive   Hepatitis B Cors Ab,IgM Non-Reactive     4/8/25 ECHO LVEF 60%    Rituximab-pvvr 375 mg/m2 x 1.85 m2 = 693.75 mg   <10% difference, okay to treat with final dose = 700 mg IV    Cyclophosphamide 400 mg/m2 x 1.85 m2 = 740 mg   <10% difference, okay to treat with final dose = 750 mg IV    Doxorubicin 25 mg/m2 x 1.85 m2 = 46.25 mg   <10% difference, okay to treat with final dose = 46 mg IV    Vincristine 1 mg fixed dose = 1 mg   <10% difference, okay to treat with final dose = 1 mg IV    Rogerio Elias, PharmD    "

## 2025-05-02 NOTE — PROGRESS NOTES
Chemotherapy Verification - PRIMARY RN      Height = 1.72 m  Weight = 71.3 kg  BSA = 1.85 m2       Medication: rituximab-pvvr (Ruxience)  Dose: 375 mg/m2  Calculated Dose: 693.75 mg                             (In mg/m2, AUC, mg/kg)     Medication: cyclophosphamide (Cytoxan)  Dose: 400 mg/m2  Calculated Dose: 740 mg                             (In mg/m2, AUC, mg/kg)    Medication: doxorubicin (Adriamycin)  Dose: 25 mg/m2  Calculated Dose: 46.25 mg                             (In mg/m2, AUC, mg/kg)    Medication: vincristine (oncovin)  Dose: 1 mg set dose  Calculated Dose: 1 mg                             (In mg/m2, AUC, mg/kg)    I confirm this process was performed independently with the BSA and all final chemotherapy dosing calculations congruent.  Any discrepancies of 10% or greater have been addressed with the chemotherapy pharmacist. The resolution of the discrepancy has been documented in the EPIC progress notes.

## 2025-05-06 ENCOUNTER — OFFICE VISIT (OUTPATIENT)
Dept: UROLOGY | Facility: MEDICAL CENTER | Age: 75
End: 2025-05-06
Payer: MEDICARE

## 2025-05-06 ENCOUNTER — PHARMACY VISIT (OUTPATIENT)
Dept: PHARMACY | Facility: MEDICAL CENTER | Age: 75
End: 2025-05-06
Payer: MEDICARE

## 2025-05-06 DIAGNOSIS — N13.8 BENIGN PROSTATIC HYPERPLASIA WITH URINARY OBSTRUCTION: ICD-10-CM

## 2025-05-06 DIAGNOSIS — N40.1 BENIGN PROSTATIC HYPERPLASIA WITH URINARY OBSTRUCTION: ICD-10-CM

## 2025-05-06 LAB
POC POST-VOID: 145 ML
POC PRE-VOID: NORMAL

## 2025-05-06 PROCEDURE — 51798 US URINE CAPACITY MEASURE: CPT | Performed by: UROLOGY

## 2025-05-06 PROCEDURE — RXMED WILLOW AMBULATORY MEDICATION CHARGE: Performed by: STUDENT IN AN ORGANIZED HEALTH CARE EDUCATION/TRAINING PROGRAM

## 2025-05-06 PROCEDURE — 99204 OFFICE O/P NEW MOD 45 MIN: CPT | Performed by: UROLOGY

## 2025-05-08 ENCOUNTER — TELEPHONE (OUTPATIENT)
Dept: HEMATOLOGY ONCOLOGY | Facility: MEDICAL CENTER | Age: 75
End: 2025-05-08
Payer: MEDICARE

## 2025-05-08 DIAGNOSIS — F03.A0 MILD DEMENTIA WITHOUT BEHAVIORAL DISTURBANCE, PSYCHOTIC DISTURBANCE, MOOD DISTURBANCE, OR ANXIETY, UNSPECIFIED DEMENTIA TYPE (HCC): ICD-10-CM

## 2025-05-08 NOTE — TELEPHONE ENCOUNTER
I called and spoke with the patient's daughter, Cecy, and let her know that her dad is scheduled for his PET CT on June 26, 2025, at 11:15 am ([address]).  Imaging has sent the prep for this through my-chart.  We also scheduled her dad's follow up appointment with Dr. Raza for June 17, 2025, at 1:20 pm, to go over results.

## 2025-05-15 NOTE — Clinical Note
REFERRAL APPROVAL NOTICE         Sent on May 15, 2025                   Andrew Gamez  [address] [facility] [address]                   Dear Mr. Gamez,    After a careful review of the medical information and benefit coverage, [facility] has processed your referral. See below for additional details.    If applicable, you must be actively enrolled with your insurance for coverage of the authorized service. If you have any questions regarding your coverage, please contact your insurance directly.    REFERRAL INFORMATION   Referral #:  [id number]  Referred-To Department    Referred-By Provider:  Urology    Salas Kolb M.D.   [facility] Urology      [address]  [facility] [address]  [phone] [address]  [facility] [id number] [phone]    Referral Start Date:  05/15/2025  Referral End Date:   05/15/2027             SCHEDULING  If you do not already have an appointment, please call [phone] to make an appointment.     MORE INFORMATION  If you do not already have a [employer] account, sign up at: [email or web address]  You can access your medical information, make appointments, see lab results, billing information, and more.  If you have questions regarding this referral, please contact  the [facility] Referrals department at:             [phone]. Monday - Friday 8:00AM - 5:00PM.     Sincerely,    [facility]

## 2025-05-19 ENCOUNTER — PROCEDURE VISIT (OUTPATIENT)
Dept: UROLOGY | Facility: MEDICAL CENTER | Age: 75
End: 2025-05-19
Payer: MEDICARE

## 2025-05-19 DIAGNOSIS — N35.914 ANTERIOR URETHRAL STRICTURE: ICD-10-CM

## 2025-05-19 DIAGNOSIS — N13.8 BENIGN PROSTATIC HYPERPLASIA WITH URINARY OBSTRUCTION: Primary | ICD-10-CM

## 2025-05-19 DIAGNOSIS — N40.1 BENIGN PROSTATIC HYPERPLASIA WITH URINARY OBSTRUCTION: Primary | ICD-10-CM

## 2025-05-19 LAB
APPEARANCE UR: CLEAR
BILIRUB UR STRIP-MCNC: NORMAL MG/DL
COLOR UR AUTO: NORMAL
GLUCOSE UR STRIP.AUTO-MCNC: NORMAL MG/DL
KETONES UR STRIP.AUTO-MCNC: NORMAL MG/DL
LEUKOCYTE ESTERASE UR QL STRIP.AUTO: NORMAL
NITRITE UR QL STRIP.AUTO: NORMAL
PH UR STRIP.AUTO: 5.5 [PH] (ref 5–8)
PROT UR QL STRIP: 30 MG/DL
RBC UR QL AUTO: NORMAL
SP GR UR STRIP.AUTO: 1.02
UROBILINOGEN UR STRIP-MCNC: 0.2 MG/DL

## 2025-05-19 PROCEDURE — 99213 OFFICE O/P EST LOW 20 MIN: CPT | Performed by: UROLOGY

## 2025-05-19 PROCEDURE — RXMED WILLOW AMBULATORY MEDICATION CHARGE: Performed by: STUDENT IN AN ORGANIZED HEALTH CARE EDUCATION/TRAINING PROGRAM

## 2025-05-19 PROCEDURE — 81002 URINALYSIS NONAUTO W/O SCOPE: CPT | Performed by: UROLOGY

## 2025-05-19 NOTE — PROGRESS NOTES
"Pharmacy Chemotherapy calculation:    DX: Gastric B-cell lymphoma, marginal zone lymphoma with transformation to DLBCL    Cycle 3  Previous treatment = C2 on 5/2/25    Regimen: Mini-RCHOP    *Dosing Reference*  Cyclophosphamide 400 mg/m2 IV over 30 minutes on Day 1  Doxorubicin 25 mg/m2 IV push on Day 1  Vincristine 1 mg IV over 5-10 minutes on Day 1  Prednisone 40 mg/m2 PO daily on Days 1-5  Rituximab 375 mg/m2 IV on Day 1  21-day cycle for 6 cycles  NCCN Guidelines for B-Cell Lymphomas V.2.2025.  Millicent F, et al. Lancet Oncol. 2011;12(5):460-8.  Al-Anahi D, et al. Am J Hematol. 2024;99(2):216-222.    Allergies: Penicillins   BP (!) 140/70   Pulse (!) 102   Temp 36.1 °C (97 °F) (Temporal)   Resp 18   Ht 1.72 m (5' 7.72\")   Wt 70.9 kg (156 lb 4.9 oz)   SpO2 96%   BMI 23.97 kg/m²   Body surface area is 1.84 meters squared.    Labs 5/22/25:  ANC~ 2400 Plt = 384k   Hgb = 8.9     SCr = 0.71 mg/dL CrCl ~ 87 mL/min   AST/ALT/AP = WNL TBili = <0.2       03/31/25 15:05   Hepatitis B Surface Antigen Non-Reactive   Hepatitis B Cors Ab,IgM Non-Reactive     4/8/25 ECHO LVEF 60%    Rituximab-pvvr 375 mg/m2 x 1.84 m2 =690 mg   <10% difference, okay to treat with final dose = 700 mg IV    Cyclophosphamide 400 mg/m2 x 1.84 m2 = 736 mg   <10% difference, okay to treat with final dose = 750 mg IV    Doxorubicin 25 mg/m2 x 1.84 m2 = 46 mg   <10% difference, okay to treat with final dose = 46 mg IV    Vincristine 1 mg fixed dose = 1 mg   <10% difference, okay to treat with final dose = 1 mg IV    Aditi Wang, PharmD    "

## 2025-05-19 NOTE — PROGRESS NOTES
Chief Complaint: Urethral stricture    HPI: Arya aGmez is a 75 y.o. male with a history of BPH s/p TURP in 2021, with subsequent urethral stricture treated with DVIU, referred for follow up of urinary symptoms and a recent episode of urinary retention in mid-April. At that time he had a catheter inserted in the ED, and after two weeks this was removed and he was instructed in performing CIC.     He last catheterized about one week ago (about 5/12/25) as he was having some difficulty with sensation in his fingers and caused some bleeding with catheterization. He has been voiding subjectively well, without strain and with an improved flow. He feels well at this time.     Urinalysis today is clean without evidence of infection, inflammation, or blood.     Patient presents with Obstructive voiding complaints, Urinary retention, Recurrent UTI, Pelvic/perineal pain.     Pt has had 3 instrumentations in the past.    Date of his last instrumentation: 5/12/2025     Most recent instrumentation: Urethral dilation    Currently performing CIC? no    Indwelling Chambers? no    Frequency of CIC: N/A     Is there history of prior urethral reconstruction? no    Type of etiology? Iatrogenic    Does the patient display erectile dysfunction including any use of PDEI? no    Is chordee prevalent? no    Does the patient have a suprapubic tube ? no    Other findings: None    UTI: None      Past Medical History:  Past Medical History[1]    Past Surgical History:  Past Surgical History[2]    Family History:  Family History   Problem Relation Age of Onset    Cancer Maternal Grandfather        Social History:  Social History     Socioeconomic History    Marital status: Single     Spouse name: Not on file    Number of children: Not on file    Years of education: Not on file    Highest education level: Not on file   Occupational History    Not on file   Tobacco Use    Smoking status: Former     Current packs/day: 0.00     Average  packs/day: 1.5 packs/day for 10.0 years (15.0 ttl pk-yrs)     Types: Cigarettes     Start date:      Quit date:      Years since quittin.4    Smokeless tobacco: Former     Types: Snuff     Quit date:    Vaping Use    Vaping status: Never Used   Substance and Sexual Activity    Alcohol use: Not Currently    Drug use: Not Currently    Sexual activity: Not on file   Other Topics Concern    Not on file   Social History Narrative    Patient lives in [place]. Daughter lives in [place]. Retired, used to do a number of jobs, most of which required physical labor.      Social Drivers of Health     Financial Resource Strain: Not on file   Food Insecurity: Not on file (2025)   Transportation Needs: No Transportation Needs (2025)    Received from [employer] [facility] (and [facility] prior to 2021)    Transportation Needs     Patient needs follow up regarding:: 1   Physical Activity: Not on file   Stress: Not on file   Social Connections: Not on file   Intimate Partner Violence: Not At Risk (2025)    Received from [employer] (and [facility] prior to 2021)    Feeling Safe      Are you in a relationship with someone who hurts you emotionally and/or physically?: No   Housing Stability: Low Risk  (3/17/2025)    Housing Stability Vital Sign     Unable to Pay for Housing in the Last Year: No     Number of Times Moved in the Last Year: 0     Homeless in the Last Year: No       Medications:  Current Medications[3]    Allergies:  Allergies[4]    Review of Systems:  Constitutional: Negative for fever, chills and malaise/fatigue.   HENT: Negative for congestion.    Eyes: Negative for pain.   Respiratory: Negative for cough and shortness of breath.    Cardiovascular: Negative for leg swelling.   Gastrointestinal: Negative for nausea, vomiting, abdominal pain and diarrhea.   Genitourinary: Negative for dysuria and  hematuria.   Skin: Negative for rash.   Neurological: Negative for dizziness, focal weakness and headaches.   Endo/Heme/Allergies: Does not bruise/bleed easily.   Psychiatric/Behavioral: Negative for depression.  The patient is not nervous/anxious.        Physical Exam:  There were no vitals filed for this visit.    GENERAL: well appearing, well nourished, NAD  RESP: respiratory effort normal  ABDOMEN: soft, nontender, nondistended, no masses or organomegaly  SKIN/LYMPH: normal coloration and turgor, no suspicious skin lesions noted  NEURO/PSYCH: alert, oriented, normal speech, no focal findings or movement disorder noted  EXTREMITIES: no pedal edema noted  GENITOURINARY: normal male external genitalia      Data Review:    Labs:  POCT UA   Lab Results   Component Value Date/Time    POCCOLOR dark yellow 05/19/2025 10:09 AM    POCAPPEAR clear 05/19/2025 10:09 AM    POCLEUKEST neg 05/19/2025 10:09 AM    POCNITRITE neg 05/19/2025 10:09 AM    POCUROBILIGE 0.2 05/19/2025 10:09 AM    POCPROTEIN 30 05/19/2025 10:09 AM    POCURPH 5.5 05/19/2025 10:09 AM    POCBLOOD neg 05/19/2025 10:09 AM    POCSPGRV 1.025 05/19/2025 10:09 AM    POCKETONES neg 05/19/2025 10:09 AM    POCBILIRUBIN neg 05/19/2025 10:09 AM    POCGLUCUA neg 05/19/2025 10:09 AM      CBC   Lab Results   Component Value Date/Time    WBC 4.3 (L) 05/01/2025 1620    RBC 3.68 (L) 05/01/2025 1620    HEMOGLOBIN 9.3 (L) 05/01/2025 1620    HEMATOCRIT 29.9 (L) 05/01/2025 1620    MCV 81.3 (L) 05/01/2025 1620    MCH 25.3 (L) 05/01/2025 1620    MCHC 31.1 (L) 05/01/2025 1620    RDW 53.4 (H) 05/01/2025 1620    MPV 9.2 05/01/2025 1620    LYMPHOCYTES 16.20 (L) 05/01/2025 1620    LYMPHS 0.70 (L) 05/01/2025 1620    MONOCYTES 18.60 (H) 05/01/2025 1620    MONOS 0.80 05/01/2025 1620    EOSINOPHILS 3.70 05/01/2025 1620    EOS 0.16 05/01/2025 1620    BASOPHILS 1.20 05/01/2025 1620    BASO 0.05 05/01/2025 1620    NRBC 0.00 05/01/2025 1620       CMP   Lab Results   Component Value Date/Time  "   SODIUM 138 05/01/2025 1620    POTASSIUM 4.5 05/01/2025 1620    CHLORIDE 105 05/01/2025 1620    CO2 22 05/01/2025 1620    ANION 11.0 05/01/2025 1620    GLUCOSE 95 05/01/2025 1620    BUN 27 (H) 05/01/2025 1620    CREATININE 0.69 05/01/2025 1620    GFRCKD 95 05/01/2025 1620    CALCIUM 10.0 05/01/2025 1620    CORRCALC 10.2 05/01/2025 1620    ASTSGOT 22 05/01/2025 1620    ALTSGPT 17 05/01/2025 1620    ALKPHOSPHAT 102 (H) 05/01/2025 1620    TBILIRUBIN 0.2 05/01/2025 1620    ALBUMIN 3.7 05/01/2025 1620    TOTPROTEIN 6.5 05/01/2025 1620    GLOBULIN 2.8 05/01/2025 1620    AGRATIO 1.3 05/01/2025 1620     INFERTILITY No results found for: \"FSH\", \"LH\", \"PROLACT\", \"ESTRADL\", \"TESTOSTERONE\", \"FREETESTOST\", \"TESTLCMS\", \"SEXHORM\"  PSA No results found for: \"PSATOTAL\"      Assessment: 75 y.o. male with a history of urethral stricture following TURP, treated with DVIU in 2021 and then more recently with catheter insertion for acute urinary retention on 4/14 followed by a brief period of CIC (last catheterized around 5/12/25).     We discussed the natural  history of urethral stricture disease, the concept of urethral rest to allow for more accurate evaluation of a stricture, and potential treatment options depending on the stricture characteristics. Briefly, these may include observation, CIC, indwelling Chambers or suprapubic catheter, repeat endoscopic stricture treatment including possible use of a drug-coated balloon, and reconstructive surgery (urethroplasty).     He's doing well at the moment, and I explained that if we were to do cystoscopy today it would likely demonstrate a urethral lumen that is better than things may look after a period of urethral rest when stricture is given a chance to mature. Usually I'd recommend 2-3 months of urethral rest, but given his episode of AUR in April we discussed the importance of monitoring his urinary function/symptoms and intervening sooner if needed. We could proceed sooner with an " endoscopic treatment if needed, though would be doing so without a full understanding of the full-extent of his stricture disease, or we could have a suprapubic catheter inserted if he does have significant worsening of obstructive symptoms while still allowing for urethral rest and full stricture evaluation.       Plan:    -Will give a period of urethral rest and schedule RUG and cystoscopy in 5 weeks (6 weeks from last self-catheterization)  -If Mr. Moscoso has increasing difficulty voiding, we can consider scheduling urgent endoscopic intervention (DVIU, DCB dilation, etc) while acknowledging that we may be treating based on suboptimal evaluation of the extent of the stricture, or placing a suprapubic tube to allow for full urethral rest      Melchor Queen MD         [1]   Past Medical History:  Diagnosis Date    Anesthesia     Arthritis     knees, wrists    Cancer (HCC)     Delayed emergence from general anesthesia     Dental disorder     upper partial    Disorder of thyroid     High cholesterol     Pain     Urinary incontinence    [2]   Past Surgical History:  Procedure Laterality Date    IN UPPER GI ENDOSCOPY,DIAGNOSIS N/A 3/18/2025    Procedure: GASTROSCOPY WITH BIOPSY;  Surgeon: Gricelda Coombs M.D.;  Location: SURGERY SAME DAY [facility];  Service: Gastroenterology    TURP BUTTON N/A 6/11/2021    Procedure: TURP, USING BUTTON ELECTRODE-BIPOLAR.;  Surgeon: Massimo Gee M.D.;  Location: SURGERY [facility];  Service: Urology    HERNIA REPAIR  2003    MENISCUS REPAIR Left 1978   [3]   Current Outpatient Medications   Medication Sig Dispense Refill    predniSONE (DELTASONE) 20 MG Tab Take 3.5 Tablets by mouth every day. on days 1-5 of each treatment cycle. 30 Tablet 0    docosahexanoic acid (OMEGA 3 FA) 1000 MG Cap       Cholecalciferol (VITAMIN D-3) 25 MCG (1000 UT) Cap       allopurinol (ZYLOPRIM) 300 MG Tab Take 1 Tablet by mouth every day. 30 Tablet 3    ondansetron (ZOFRAN) 4 MG Tab tablet Take 1  Tablet by mouth every four hours as needed for Nausea/Vomiting. 20 Tablet 3    prochlorperazine (COMPAZINE) 10 MG Tab Take 1 Tablet by mouth every 6 hours as needed for Nausea/Vomiting. 30 Tablet 3    lidocaine-prilocaine (EMLA) 2.5-2.5 % Cream Apply to port 1 hour prior to access of port and cover with plastic wrap. 30 g 3    OLANZapine (ZYPREXA) 5 MG Tab Take 1 tablet by mouth nightly on days 1-4 of each chemotherapy cycle. 4 Tablet 3    B Complex Vitamins (VITAMIN B COMPLEX PO) Take  by mouth.      Saccharomyces boulardii (PROBIOTIC) 250 MG Cap Take  by mouth.      tamsulosin (FLOMAX) 0.4 MG capsule Take 2 Capsules by mouth every day. 30 Capsule 0    omeprazole (PRILOSEC) 40 MG delayed-release capsule Take 1 Capsule by mouth 2 times a day for 120 days. 120 Capsule 1    atorvastatin (LIPITOR) 10 MG Tab       Glucosamine HCl (GLUCOSAMINE PO) Take 1 Dose by mouth every day.      acetaminophen (TYLENOL) 500 MG Tab Take 500-1,000 mg by mouth every 6 hours as needed.       No current facility-administered medications for this visit.   [4]   Allergies  Allergen Reactions    Penicillins      Childhood reaction

## 2025-05-22 ENCOUNTER — PHARMACY VISIT (OUTPATIENT)
Dept: PHARMACY | Facility: MEDICAL CENTER | Age: 75
End: 2025-05-22
Payer: MEDICARE

## 2025-05-22 ENCOUNTER — HOSPITAL ENCOUNTER (OUTPATIENT)
Dept: HEMATOLOGY ONCOLOGY | Facility: MEDICAL CENTER | Age: 75
End: 2025-05-22
Attending: NURSE PRACTITIONER
Payer: MEDICARE

## 2025-05-22 ENCOUNTER — HOSPITAL ENCOUNTER (OUTPATIENT)
Dept: LAB | Facility: MEDICAL CENTER | Age: 75
End: 2025-05-22
Attending: STUDENT IN AN ORGANIZED HEALTH CARE EDUCATION/TRAINING PROGRAM
Payer: MEDICARE

## 2025-05-22 VITALS
DIASTOLIC BLOOD PRESSURE: 60 MMHG | SYSTOLIC BLOOD PRESSURE: 128 MMHG | BODY MASS INDEX: 23.52 KG/M2 | TEMPERATURE: 98.9 F | WEIGHT: 155.2 LBS | HEART RATE: 96 BPM | OXYGEN SATURATION: 96 % | HEIGHT: 68 IN | RESPIRATION RATE: 16 BRPM

## 2025-05-22 DIAGNOSIS — Z79.899 HIGH RISK MEDICATION USE: ICD-10-CM

## 2025-05-22 DIAGNOSIS — R53.83 CHEMOTHERAPY-INDUCED FATIGUE: ICD-10-CM

## 2025-05-22 DIAGNOSIS — D50.8 OTHER IRON DEFICIENCY ANEMIA: ICD-10-CM

## 2025-05-22 DIAGNOSIS — T45.1X5A CHEMOTHERAPY-INDUCED FATIGUE: ICD-10-CM

## 2025-05-22 DIAGNOSIS — C85.99 GASTRIC LYMPHOMA (HCC): ICD-10-CM

## 2025-05-22 DIAGNOSIS — Z79.899 ENCOUNTER FOR LONG-TERM CURRENT USE OF HIGH RISK MEDICATION: ICD-10-CM

## 2025-05-22 DIAGNOSIS — Z95.828 PORT-A-CATH IN PLACE: ICD-10-CM

## 2025-05-22 LAB
ALBUMIN SERPL BCP-MCNC: 3.6 G/DL (ref 3.2–4.9)
ALBUMIN/GLOB SERPL: 1.3 G/DL
ALP SERPL-CCNC: 96 U/L (ref 30–99)
ALT SERPL-CCNC: 17 U/L (ref 2–50)
ANION GAP SERPL CALC-SCNC: 10 MMOL/L (ref 7–16)
AST SERPL-CCNC: 26 U/L (ref 12–45)
BASOPHILS # BLD AUTO: 1.6 % (ref 0–1.8)
BASOPHILS # BLD: 0.07 K/UL (ref 0–0.12)
BILIRUB SERPL-MCNC: <0.2 MG/DL (ref 0.1–1.5)
BUN SERPL-MCNC: 29 MG/DL (ref 8–22)
CALCIUM ALBUM COR SERPL-MCNC: 10.3 MG/DL (ref 8.5–10.5)
CALCIUM SERPL-MCNC: 10 MG/DL (ref 8.5–10.5)
CHLORIDE SERPL-SCNC: 103 MMOL/L (ref 96–112)
CO2 SERPL-SCNC: 22 MMOL/L (ref 20–33)
CREAT SERPL-MCNC: 0.71 MG/DL (ref 0.5–1.4)
EOSINOPHIL # BLD AUTO: 0.15 K/UL (ref 0–0.51)
EOSINOPHIL NFR BLD: 3.4 % (ref 0–6.9)
ERYTHROCYTE [DISTWIDTH] IN BLOOD BY AUTOMATED COUNT: 56 FL (ref 35.9–50)
GFR SERPLBLD CREATININE-BSD FMLA CKD-EPI: 94 ML/MIN/1.73 M 2
GLOBULIN SER CALC-MCNC: 2.7 G/DL (ref 1.9–3.5)
GLUCOSE SERPL-MCNC: 110 MG/DL (ref 65–99)
HCT VFR BLD AUTO: 29.7 % (ref 42–52)
HGB BLD-MCNC: 8.9 G/DL (ref 14–18)
IMM GRANULOCYTES # BLD AUTO: 0.01 K/UL (ref 0–0.11)
IMM GRANULOCYTES NFR BLD AUTO: 0.2 % (ref 0–0.9)
LDH SERPL L TO P-CCNC: 228 U/L (ref 107–266)
LYMPHOCYTES # BLD AUTO: 0.71 K/UL (ref 1–4.8)
LYMPHOCYTES NFR BLD: 16.2 % (ref 22–41)
MCH RBC QN AUTO: 23.8 PG (ref 27–33)
MCHC RBC AUTO-ENTMCNC: 30 G/DL (ref 32.3–36.5)
MCV RBC AUTO: 79.4 FL (ref 81.4–97.8)
MONOCYTES # BLD AUTO: 1.03 K/UL (ref 0–0.85)
MONOCYTES NFR BLD AUTO: 23.6 % (ref 0–13.4)
NEUTROPHILS # BLD AUTO: 2.4 K/UL (ref 1.82–7.42)
NEUTROPHILS NFR BLD: 55 % (ref 44–72)
NRBC # BLD AUTO: 0 K/UL
NRBC BLD-RTO: 0 /100 WBC (ref 0–0.2)
PHOSPHATE SERPL-MCNC: 3.2 MG/DL (ref 2.5–4.5)
PLATELET # BLD AUTO: 384 K/UL (ref 164–446)
PMV BLD AUTO: 9.2 FL (ref 9–12.9)
POTASSIUM SERPL-SCNC: 5 MMOL/L (ref 3.6–5.5)
PROT SERPL-MCNC: 6.3 G/DL (ref 6–8.2)
RBC # BLD AUTO: 3.74 M/UL (ref 4.7–6.1)
SODIUM SERPL-SCNC: 135 MMOL/L (ref 135–145)
URATE SERPL-MCNC: 2.5 MG/DL (ref 2.5–8.3)
WBC # BLD AUTO: 4.4 K/UL (ref 4.8–10.8)

## 2025-05-22 PROCEDURE — 80053 COMPREHEN METABOLIC PANEL: CPT

## 2025-05-22 PROCEDURE — 83550 IRON BINDING TEST: CPT

## 2025-05-22 PROCEDURE — 83615 LACTATE (LD) (LDH) ENZYME: CPT

## 2025-05-22 PROCEDURE — 99212 OFFICE O/P EST SF 10 MIN: CPT | Performed by: NURSE PRACTITIONER

## 2025-05-22 PROCEDURE — 84550 ASSAY OF BLOOD/URIC ACID: CPT

## 2025-05-22 PROCEDURE — 85025 COMPLETE CBC W/AUTO DIFF WBC: CPT

## 2025-05-22 PROCEDURE — 84100 ASSAY OF PHOSPHORUS: CPT

## 2025-05-22 PROCEDURE — 36415 COLL VENOUS BLD VENIPUNCTURE: CPT

## 2025-05-22 PROCEDURE — 83540 ASSAY OF IRON: CPT

## 2025-05-22 PROCEDURE — RXOTC WILLOW AMBULATORY OTC CHARGE

## 2025-05-22 PROCEDURE — 99214 OFFICE O/P EST MOD 30 MIN: CPT | Performed by: NURSE PRACTITIONER

## 2025-05-22 RX ORDER — DIPHENHYDRAMINE HYDROCHLORIDE 50 MG/ML
25 INJECTION, SOLUTION INTRAMUSCULAR; INTRAVENOUS ONCE
Status: CANCELLED | OUTPATIENT
Start: 2025-05-23 | End: 2025-05-23

## 2025-05-22 RX ORDER — ONDANSETRON 2 MG/ML
8 INJECTION INTRAMUSCULAR; INTRAVENOUS PRN
Status: CANCELLED | OUTPATIENT
Start: 2025-05-23

## 2025-05-22 RX ORDER — ALBUTEROL SULFATE 5 MG/ML
2.5 SOLUTION RESPIRATORY (INHALATION) PRN
Status: CANCELLED | OUTPATIENT
Start: 2025-05-23

## 2025-05-22 RX ORDER — DIPHENHYDRAMINE HYDROCHLORIDE 50 MG/ML
25 INJECTION, SOLUTION INTRAMUSCULAR; INTRAVENOUS PRN
Status: CANCELLED | OUTPATIENT
Start: 2025-05-23

## 2025-05-22 RX ORDER — ACETAMINOPHEN 325 MG/1
650 TABLET ORAL PRN
Status: CANCELLED | OUTPATIENT
Start: 2025-05-23 | End: 2025-05-25

## 2025-05-22 RX ORDER — MEPERIDINE HYDROCHLORIDE 25 MG/ML
25 INJECTION INTRAMUSCULAR; INTRAVENOUS; SUBCUTANEOUS PRN
Status: CANCELLED | OUTPATIENT
Start: 2025-05-23 | End: 2025-05-25

## 2025-05-22 RX ORDER — ONDANSETRON 8 MG/1
8 TABLET, ORALLY DISINTEGRATING ORAL PRN
Status: CANCELLED | OUTPATIENT
Start: 2025-05-23

## 2025-05-22 RX ORDER — ACETAMINOPHEN 325 MG/1
650 TABLET ORAL PRN
Status: CANCELLED | OUTPATIENT
Start: 2025-05-23

## 2025-05-22 RX ORDER — SODIUM CHLORIDE 9 MG/ML
INJECTION, SOLUTION INTRAVENOUS CONTINUOUS
Status: CANCELLED | OUTPATIENT
Start: 2025-05-23

## 2025-05-22 RX ORDER — EPINEPHRINE 1 MG/ML(1)
0.5 AMPUL (ML) INJECTION PRN
Status: CANCELLED | OUTPATIENT
Start: 2025-05-23

## 2025-05-22 RX ORDER — SODIUM CHLORIDE 9 MG/ML
1000 INJECTION, SOLUTION INTRAVENOUS PRN
Status: CANCELLED | OUTPATIENT
Start: 2025-05-23

## 2025-05-22 RX ORDER — SODIUM CHLORIDE 9 MG/ML
500 INJECTION, SOLUTION INTRAVENOUS ONCE
Status: CANCELLED | OUTPATIENT
Start: 2025-05-23 | End: 2025-05-23

## 2025-05-22 RX ORDER — PALONOSETRON 0.05 MG/ML
0.25 INJECTION, SOLUTION INTRAVENOUS ONCE
Status: CANCELLED | OUTPATIENT
Start: 2025-05-23 | End: 2025-05-23

## 2025-05-22 RX ORDER — 0.9 % SODIUM CHLORIDE 0.9 %
10 VIAL (ML) INJECTION PRN
Status: CANCELLED | OUTPATIENT
Start: 2025-05-22

## 2025-05-22 RX ORDER — MEPERIDINE HYDROCHLORIDE 25 MG/ML
25 INJECTION INTRAMUSCULAR; INTRAVENOUS; SUBCUTANEOUS PRN
Status: CANCELLED | OUTPATIENT
Start: 2025-05-23

## 2025-05-22 RX ORDER — SODIUM CHLORIDE 9 MG/ML
500 INJECTION, SOLUTION INTRAVENOUS ONCE
Status: CANCELLED | OUTPATIENT
Start: 2025-05-23

## 2025-05-22 RX ORDER — 0.9 % SODIUM CHLORIDE 0.9 %
VIAL (ML) INJECTION PRN
Status: CANCELLED | OUTPATIENT
Start: 2025-05-23

## 2025-05-22 RX ORDER — PROCHLORPERAZINE MALEATE 10 MG
10 TABLET ORAL EVERY 6 HOURS PRN
Status: CANCELLED | OUTPATIENT
Start: 2025-05-23

## 2025-05-22 RX ORDER — HYDROCORTISONE SODIUM SUCCINATE 100 MG/2ML
100 INJECTION INTRAMUSCULAR; INTRAVENOUS PRN
Status: CANCELLED | OUTPATIENT
Start: 2025-05-23 | End: 2025-05-25

## 2025-05-22 RX ORDER — DIPHENHYDRAMINE HYDROCHLORIDE 50 MG/ML
25 INJECTION, SOLUTION INTRAMUSCULAR; INTRAVENOUS PRN
Status: CANCELLED | OUTPATIENT
Start: 2025-05-23 | End: 2025-05-25

## 2025-05-22 RX ORDER — LORAZEPAM 2 MG/ML
0.5 INJECTION INTRAMUSCULAR PRN
Status: CANCELLED | OUTPATIENT
Start: 2025-05-23

## 2025-05-22 RX ORDER — 0.9 % SODIUM CHLORIDE 0.9 %
10 VIAL (ML) INJECTION PRN
Status: CANCELLED | OUTPATIENT
Start: 2025-05-23

## 2025-05-22 RX ORDER — LORAZEPAM 0.5 MG/1
0.5 TABLET ORAL PRN
Status: CANCELLED | OUTPATIENT
Start: 2025-05-23

## 2025-05-22 RX ORDER — 0.9 % SODIUM CHLORIDE 0.9 %
3 VIAL (ML) INJECTION PRN
Status: CANCELLED | OUTPATIENT
Start: 2025-05-23

## 2025-05-22 RX ORDER — 0.9 % SODIUM CHLORIDE 0.9 %
3 VIAL (ML) INJECTION PRN
Status: CANCELLED | OUTPATIENT
Start: 2025-05-22

## 2025-05-22 RX ORDER — METHYLPREDNISOLONE SODIUM SUCCINATE 125 MG/2ML
125 INJECTION, POWDER, LYOPHILIZED, FOR SOLUTION INTRAMUSCULAR; INTRAVENOUS PRN
Status: CANCELLED | OUTPATIENT
Start: 2025-05-23

## 2025-05-22 RX ORDER — ACETAMINOPHEN 325 MG/1
650 TABLET ORAL ONCE
Status: CANCELLED | OUTPATIENT
Start: 2025-05-23 | End: 2025-05-23

## 2025-05-22 RX ORDER — 0.9 % SODIUM CHLORIDE 0.9 %
VIAL (ML) INJECTION PRN
Status: CANCELLED | OUTPATIENT
Start: 2025-05-22

## 2025-05-22 ASSESSMENT — ENCOUNTER SYMPTOMS
FEVER: 0
COUGH: 0
CHILLS: 0
MEMORY LOSS: 1
DIARRHEA: 0
SHORTNESS OF BREATH: 1
TINGLING: 0
CONSTIPATION: 1
WHEEZING: 0
NAUSEA: 0
PALPITATIONS: 0
WEIGHT LOSS: 0
MYALGIAS: 0
HEADACHES: 0
INSOMNIA: 0
DIZZINESS: 1
VOMITING: 0

## 2025-05-22 ASSESSMENT — FIBROSIS 4 INDEX: FIB4 SCORE: 1.07

## 2025-05-22 NOTE — PROGRESS NOTES
Subjective     Andrew Gamez is a 75 y.o. male who presents with Lymphoma ([place]/pre chemo)            HPI  Mr. 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 He is accompanied by his daughter for today's visit.     Patient was in his usual state of health until he underwent total knee arthroplasty on 3/4/25. He was taking aspirin for DVT prophylaxis as well as ibuprofen for pain and developed severe anemia with gastric ulcers. He underwent EGD on 3/15/25 at [facility] which showed large gastric ulcers. Patient was transferred to [facility], EGD repeated 3/18/25 showing 6 cm ulcer and numerous surrounding ulcers in the stomach, biopsy was completed with pathology ultimately confirming malignancy. PET completed 3/31/25 showed wall thickening of the gastric fundus with corresponding mild increase activity likely related to patient's known gastric malignancy, small curvilinear focus of increased activity in the right mid abdomen, subcutaneous nodule with eccentric increased activity in the right gluteal region, likely sebaceous cyst; no other evidence of metastatic disease. Bone marrow biopsy was completed on 4/8/25 normal male karyotype 46, XY [20]; no evidence of involvement by B-cell lymphoproliferative process. Patient initiated mini R-CHOP 4/11/25.     Patient was evaluated per ED on 4/14 with urinary retention noted, evacuated per catheter, initiated on Flomax, he has self cath materials on hand from h/o prostate surgery.  He is needing to self cath intermittently to ensure complete evacuation but symptoms are improved overall.  Nausea is well-managed with Zyprexa days 1-4 following treatment.  Constipation is well-managed with milk of magnesia daily, is encouraged to use MiraLAX as it is less stimulating.  He is otherwise tolerating  "treatment very well, denies B symptoms, and is at his baseline.         Review of Systems   Constitutional:  Negative for chills, fever, malaise/fatigue (sometimes more or less than usual; walking everyday) and weight loss (stable).   Respiratory:  Positive for shortness of breath (with activity). Negative for cough and wheezing.    Cardiovascular:  Positive for leg swelling (R knee at replacement). Negative for chest pain and palpitations.   Gastrointestinal:  Positive for constipation (miralax PRN is best). Negative for diarrhea, nausea and vomiting.   Genitourinary:  Negative for dysuria.   Musculoskeletal:  Negative for joint pain and myalgias.        Tylenol PRN only now   Neurological:  Positive for dizziness (hydration issue). Negative for tingling and headaches.   Psychiatric/Behavioral:  Positive for memory loss (a bit more forgetful). The patient does not have insomnia.      Allergies[1]      Medications Ordered Prior to Encounter[2]           Objective     /60 (BP Location: Right arm, Patient Position: Sitting, BP Cuff Size: Adult)   Pulse 96   Temp 37.2 °C (98.9 °F) (Temporal)   Resp 16   Ht 1.72 m (5' 7.72\")   Wt 70.4 kg (155 lb 3.2 oz)   SpO2 96%   BMI 23.79 kg/m²      Physical Exam  Vitals reviewed.   Constitutional:       General: He is not in acute distress.     Appearance: He is well-developed. He is not diaphoretic.      Comments: fatigued   HENT:      Head: Normocephalic and atraumatic.   Eyes:      General: No scleral icterus.        Right eye: No discharge.         Left eye: No discharge.   Cardiovascular:      Rate and Rhythm: Normal rate and regular rhythm.      Heart sounds: Normal heart sounds. No murmur heard.     No friction rub. No gallop.   Pulmonary:      Effort: Pulmonary effort is normal. No respiratory distress.      Breath sounds: Normal breath sounds. No wheezing.   Abdominal:      General: There is no distension.      Palpations: Abdomen is soft.      Tenderness: " There is no abdominal tenderness.   Musculoskeletal:         General: Swelling (R knee (h/o replacement)) present. Normal range of motion.      Cervical back: Normal range of motion.   Skin:     General: Skin is warm and dry.      Coloration: Skin is not pale.      Findings: No erythema or rash.   Neurological:      Mental Status: He is alert and oriented to person, place, and time.   Psychiatric:         Behavior: Behavior normal.       Hospital Outpatient Visit on 05/22/2025   Component Date Value Ref Range Status    WBC 05/22/2025 4.4 (L)  4.8 - 10.8 K/uL Final    RBC 05/22/2025 3.74 (L)  4.70 - 6.10 M/uL Final    Hemoglobin 05/22/2025 8.9 (L)  14.0 - 18.0 g/dL Final    Hematocrit 05/22/2025 29.7 (L)  42.0 - 52.0 % Final    MCV 05/22/2025 79.4 (L)  81.4 - 97.8 fL Final    MCH 05/22/2025 23.8 (L)  27.0 - 33.0 pg Final    MCHC 05/22/2025 30.0 (L)  32.3 - 36.5 g/dL Final    RDW 05/22/2025 56.0 (H)  35.9 - 50.0 fL Final    Platelet Count 05/22/2025 384  164 - 446 K/uL Final    MPV 05/22/2025 9.2  9.0 - 12.9 fL Final    Neutrophils-Polys 05/22/2025 55.00  44.00 - 72.00 % Final    Lymphocytes 05/22/2025 16.20 (L)  22.00 - 41.00 % Final    Monocytes 05/22/2025 23.60 (H)  0.00 - 13.40 % Final    Eosinophils 05/22/2025 3.40  0.00 - 6.90 % Final    Basophils 05/22/2025 1.60  0.00 - 1.80 % Final    Immature Granulocytes 05/22/2025 0.20  0.00 - 0.90 % Final    Nucleated RBC 05/22/2025 0.00  0.00 - 0.20 /100 WBC Final    Neutrophils (Absolute) 05/22/2025 2.40  1.82 - 7.42 K/uL Final    Includes immature neutrophils, if present.    Lymphs (Absolute) 05/22/2025 0.71 (L)  1.00 - 4.80 K/uL Final    Monos (Absolute) 05/22/2025 1.03 (H)  0.00 - 0.85 K/uL Final    Eos (Absolute) 05/22/2025 0.15  0.00 - 0.51 K/uL Final    Baso (Absolute) 05/22/2025 0.07  0.00 - 0.12 K/uL Final    Immature Granulocytes (abs) 05/22/2025 0.01  0.00 - 0.11 K/uL Final    NRBC (Absolute) 05/22/2025 0.00  K/uL Final    Sodium 05/22/2025 135  135 - 145  mmol/L Final    Potassium 05/22/2025 5.0  3.6 - 5.5 mmol/L Final    Chloride 05/22/2025 103  96 - 112 mmol/L Final    Co2 05/22/2025 22  20 - 33 mmol/L Final    Anion Gap 05/22/2025 10.0  7.0 - 16.0 Final    Glucose 05/22/2025 110 (H)  65 - 99 mg/dL Final    Bun 05/22/2025 29 (H)  8 - 22 mg/dL Final    Creatinine 05/22/2025 0.71  0.50 - 1.40 mg/dL Final    Calcium 05/22/2025 10.0  8.5 - 10.5 mg/dL Final    Correct Calcium 05/22/2025 10.3  8.5 - 10.5 mg/dL Final    AST(SGOT) 05/22/2025 26  12 - 45 U/L Final    ALT(SGPT) 05/22/2025 17  2 - 50 U/L Final    Alkaline Phosphatase 05/22/2025 96  30 - 99 U/L Final    Total Bilirubin 05/22/2025 <0.2  0.1 - 1.5 mg/dL Final    Albumin 05/22/2025 3.6  3.2 - 4.9 g/dL Final    Total Protein 05/22/2025 6.3  6.0 - 8.2 g/dL Final    Globulin 05/22/2025 2.7  1.9 - 3.5 g/dL Final    A-G Ratio 05/22/2025 1.3  g/dL Final    Uric Acid 05/22/2025 2.5  2.5 - 8.3 mg/dL Final    LDH Total 05/22/2025 228  107 - 266 U/L Final    Phosphorus 05/22/2025 3.2  2.5 - 4.5 mg/dL Final    GFR (CKD-EPI) 05/22/2025 94  >60 mL/min/1.73 m 2 Final    Comment: Estimated Glomerular Filtration Rate is calculated using  race neutral CKD-EPI 2021 equation per NKF-ASN recommendations.      Iron 05/22/2025 10 (L)  50 - 180 ug/dL Final    Total Iron Binding 05/22/2025 302  250 - 450 ug/dL Final    Unsat Iron Binding 05/22/2025 292  110 - 370 ug/dL Final    % Saturation 05/22/2025 3 (L)  15 - 55 % Final            Assessment & Plan     1. Gastric lymphoma (HCC)  IR-CVC TUNNEL WITH PORT REMOVAL      2. Encounter for long-term current use of high risk medication        3. Chemotherapy-induced fatigue        4. Other iron deficiency anemia        5. Port-A-Cath in place  IR-CVC TUNNEL WITH PORT REMOVAL        1.  Fatigue: Persistent, TIBC ordered for further evaluation beyond chemo related anemia. Results indicated patient would benefit from iron dextran, scheduling to follow accordingly.    2.  Gastric lymphoma:  Diagnosed 3/18/25; initiated minim R-CHOP 4/11/25     Patient is tolerating treatment fairly well, except where addressed above. CBC, CMP, LDH, Phos, uric acid have been evaluated and found to be within acceptable limits. Patient will proceed with cycle 3 (of 3) of treatment, completed post treatment imaging in approx. 3-6 weeks, and follow up for post treatment evaluation, sooner as needed.      The patient verbalized agreement and understanding of current plan. All questions and concerns were addressed at time of visit.    Please note that this dictation was created using voice recognition software. I have made every reasonable attempt to correct obvious errors, but I expect that there are errors of grammar and possibly content that I did not discover before finalizing the note.                       [1]   Allergies  Allergen Reactions    Penicillins      Childhood reaction   [2]   Current Outpatient Medications on File Prior to Encounter   Medication Sig Dispense Refill    predniSONE (DELTASONE) 20 MG Tab Take 3.5 Tablets by mouth every day. on days 1-5 of each treatment cycle. 30 Tablet 0    docosahexanoic acid (OMEGA 3 FA) 1000 MG Cap       Cholecalciferol (VITAMIN D-3) 25 MCG (1000 UT) Cap       allopurinol (ZYLOPRIM) 300 MG Tab Take 1 Tablet by mouth every day. 30 Tablet 3    ondansetron (ZOFRAN) 4 MG Tab tablet Take 1 Tablet by mouth every four hours as needed for Nausea/Vomiting. 20 Tablet 3    prochlorperazine (COMPAZINE) 10 MG Tab Take 1 Tablet by mouth every 6 hours as needed for Nausea/Vomiting. 30 Tablet 3    lidocaine-prilocaine (EMLA) 2.5-2.5 % Cream Apply to port 1 hour prior to access of port and cover with plastic wrap. 30 g 3    OLANZapine (ZYPREXA) 5 MG Tab Take 1 tablet by mouth nightly on days 1-4 of each chemotherapy cycle. 4 Tablet 3    B Complex Vitamins (VITAMIN B COMPLEX PO) Take  by mouth.      Saccharomyces boulardii (PROBIOTIC) 250 MG Cap Take  by mouth.      tamsulosin (FLOMAX) 0.4 MG  capsule Take 2 Capsules by mouth every day. 30 Capsule 0    omeprazole (PRILOSEC) 40 MG delayed-release capsule Take 1 Capsule by mouth 2 times a day for 120 days. 120 Capsule 1    atorvastatin (LIPITOR) 10 MG Tab       Glucosamine HCl (GLUCOSAMINE PO) Take 1 Dose by mouth every day.      acetaminophen (TYLENOL) 500 MG Tab Take 500-1,000 mg by mouth every 6 hours as needed.       No current facility-administered medications on file prior to encounter.

## 2025-05-23 ENCOUNTER — OUTPATIENT INFUSION SERVICES (OUTPATIENT)
Dept: ONCOLOGY | Facility: MEDICAL CENTER | Age: 75
End: 2025-05-23
Attending: STUDENT IN AN ORGANIZED HEALTH CARE EDUCATION/TRAINING PROGRAM
Payer: MEDICARE

## 2025-05-23 ENCOUNTER — HOSPITAL ENCOUNTER (OUTPATIENT)
Facility: MEDICAL CENTER | Age: 75
End: 2025-05-23
Attending: INTERNAL MEDICINE | Admitting: INTERNAL MEDICINE
Payer: MEDICARE

## 2025-05-23 VITALS
BODY MASS INDEX: 23.69 KG/M2 | HEIGHT: 68 IN | RESPIRATION RATE: 18 BRPM | WEIGHT: 156.31 LBS | HEART RATE: 102 BPM | OXYGEN SATURATION: 96 % | SYSTOLIC BLOOD PRESSURE: 140 MMHG | TEMPERATURE: 97 F | DIASTOLIC BLOOD PRESSURE: 70 MMHG

## 2025-05-23 DIAGNOSIS — C85.99 GASTRIC LYMPHOMA (HCC): Primary | ICD-10-CM

## 2025-05-23 DIAGNOSIS — D62 ACUTE BLOOD LOSS ANEMIA: ICD-10-CM

## 2025-05-23 DIAGNOSIS — Z79.899 ENCOUNTER FOR LONG-TERM CURRENT USE OF HIGH RISK MEDICATION: Primary | ICD-10-CM

## 2025-05-23 DIAGNOSIS — C85.99 GASTRIC LYMPHOMA (HCC): ICD-10-CM

## 2025-05-23 LAB
IRON SATN MFR SERPL: 3 % (ref 15–55)
IRON SERPL-MCNC: 10 UG/DL (ref 50–180)
TIBC SERPL-MCNC: 302 UG/DL (ref 250–450)
UIBC SERPL-MCNC: 292 UG/DL (ref 110–370)

## 2025-05-23 PROCEDURE — 96367 TX/PROPH/DG ADDL SEQ IV INF: CPT

## 2025-05-23 PROCEDURE — 96411 CHEMO IV PUSH ADDL DRUG: CPT

## 2025-05-23 PROCEDURE — 96415 CHEMO IV INFUSION ADDL HR: CPT

## 2025-05-23 PROCEDURE — 96413 CHEMO IV INFUSION 1 HR: CPT

## 2025-05-23 PROCEDURE — 700102 HCHG RX REV CODE 250 W/ 637 OVERRIDE(OP): Performed by: NURSE PRACTITIONER

## 2025-05-23 PROCEDURE — 700111 HCHG RX REV CODE 636 W/ 250 OVERRIDE (IP): Mod: JZ,TB | Performed by: NURSE PRACTITIONER

## 2025-05-23 PROCEDURE — 700105 HCHG RX REV CODE 258: Performed by: NURSE PRACTITIONER

## 2025-05-23 PROCEDURE — A4212 NON CORING NEEDLE OR STYLET: HCPCS

## 2025-05-23 PROCEDURE — 96375 TX/PRO/DX INJ NEW DRUG ADDON: CPT

## 2025-05-23 PROCEDURE — 96417 CHEMO IV INFUS EACH ADDL SEQ: CPT

## 2025-05-23 PROCEDURE — A9270 NON-COVERED ITEM OR SERVICE: HCPCS | Performed by: NURSE PRACTITIONER

## 2025-05-23 RX ORDER — ALBUTEROL SULFATE 5 MG/ML
2.5 SOLUTION RESPIRATORY (INHALATION) PRN
Status: CANCELLED | OUTPATIENT
Start: 2025-05-23

## 2025-05-23 RX ORDER — DIPHENHYDRAMINE HYDROCHLORIDE 50 MG/ML
25 INJECTION, SOLUTION INTRAMUSCULAR; INTRAVENOUS PRN
Status: DISCONTINUED | OUTPATIENT
Start: 2025-05-23 | End: 2025-05-23 | Stop reason: HOSPADM

## 2025-05-23 RX ORDER — ALBUTEROL SULFATE 5 MG/ML
2.5 SOLUTION RESPIRATORY (INHALATION) PRN
Status: DISCONTINUED | OUTPATIENT
Start: 2025-05-23 | End: 2025-05-23 | Stop reason: HOSPADM

## 2025-05-23 RX ORDER — MEPERIDINE HYDROCHLORIDE 25 MG/ML
25 INJECTION INTRAMUSCULAR; INTRAVENOUS; SUBCUTANEOUS PRN
Status: CANCELLED | OUTPATIENT
Start: 2025-05-23

## 2025-05-23 RX ORDER — DIPHENHYDRAMINE HYDROCHLORIDE 50 MG/ML
25 INJECTION, SOLUTION INTRAMUSCULAR; INTRAVENOUS ONCE
Status: COMPLETED | OUTPATIENT
Start: 2025-05-23 | End: 2025-05-23

## 2025-05-23 RX ORDER — LORAZEPAM 1 MG/1
1 TABLET ORAL EVERY 4 HOURS PRN
Status: DISCONTINUED | OUTPATIENT
Start: 2025-05-23 | End: 2025-05-23 | Stop reason: HOSPADM

## 2025-05-23 RX ORDER — ACETAMINOPHEN 325 MG/1
650 TABLET ORAL PRN
Status: CANCELLED | OUTPATIENT
Start: 2025-05-23

## 2025-05-23 RX ORDER — ONDANSETRON 8 MG/1
8 TABLET, ORALLY DISINTEGRATING ORAL PRN
Status: CANCELLED | OUTPATIENT
Start: 2025-05-23

## 2025-05-23 RX ORDER — SODIUM CHLORIDE 9 MG/ML
1000 INJECTION, SOLUTION INTRAVENOUS PRN
Status: CANCELLED | OUTPATIENT
Start: 2025-05-23

## 2025-05-23 RX ORDER — METHYLPREDNISOLONE SODIUM SUCCINATE 125 MG/2ML
125 INJECTION, POWDER, LYOPHILIZED, FOR SOLUTION INTRAMUSCULAR; INTRAVENOUS PRN
Status: DISCONTINUED | OUTPATIENT
Start: 2025-05-23 | End: 2025-05-23 | Stop reason: HOSPADM

## 2025-05-23 RX ORDER — METHYLPREDNISOLONE SODIUM SUCCINATE 125 MG/2ML
125 INJECTION, POWDER, LYOPHILIZED, FOR SOLUTION INTRAMUSCULAR; INTRAVENOUS PRN
Status: CANCELLED | OUTPATIENT
Start: 2025-05-23

## 2025-05-23 RX ORDER — LORAZEPAM 1 MG/1
1 TABLET ORAL EVERY 4 HOURS PRN
Status: CANCELLED
Start: 2025-05-23

## 2025-05-23 RX ORDER — MEPERIDINE HYDROCHLORIDE 25 MG/ML
25 INJECTION INTRAMUSCULAR; INTRAVENOUS; SUBCUTANEOUS PRN
Status: DISCONTINUED | OUTPATIENT
Start: 2025-05-23 | End: 2025-05-23 | Stop reason: HOSPADM

## 2025-05-23 RX ORDER — SODIUM CHLORIDE 9 MG/ML
500 INJECTION, SOLUTION INTRAVENOUS ONCE
Status: COMPLETED | OUTPATIENT
Start: 2025-05-23 | End: 2025-05-23

## 2025-05-23 RX ORDER — ONDANSETRON 8 MG/1
8 TABLET, ORALLY DISINTEGRATING ORAL PRN
Status: DISCONTINUED | OUTPATIENT
Start: 2025-05-23 | End: 2025-05-23 | Stop reason: HOSPADM

## 2025-05-23 RX ORDER — ONDANSETRON 2 MG/ML
8 INJECTION INTRAMUSCULAR; INTRAVENOUS PRN
Status: CANCELLED | OUTPATIENT
Start: 2025-05-23

## 2025-05-23 RX ORDER — SODIUM CHLORIDE 9 MG/ML
1000 INJECTION, SOLUTION INTRAVENOUS PRN
Status: DISCONTINUED | OUTPATIENT
Start: 2025-05-23 | End: 2025-05-23 | Stop reason: HOSPADM

## 2025-05-23 RX ORDER — EPINEPHRINE 1 MG/ML(1)
0.5 AMPUL (ML) INJECTION PRN
Status: DISCONTINUED | OUTPATIENT
Start: 2025-05-23 | End: 2025-05-23 | Stop reason: HOSPADM

## 2025-05-23 RX ORDER — SODIUM CHLORIDE 9 MG/ML
INJECTION, SOLUTION INTRAVENOUS CONTINUOUS
Status: CANCELLED | OUTPATIENT
Start: 2025-05-23

## 2025-05-23 RX ORDER — ONDANSETRON 2 MG/ML
8 INJECTION INTRAMUSCULAR; INTRAVENOUS PRN
Status: DISCONTINUED | OUTPATIENT
Start: 2025-05-23 | End: 2025-05-23 | Stop reason: HOSPADM

## 2025-05-23 RX ORDER — EPINEPHRINE 1 MG/ML(1)
0.5 AMPUL (ML) INJECTION PRN
Status: CANCELLED | OUTPATIENT
Start: 2025-05-23

## 2025-05-23 RX ORDER — SODIUM CHLORIDE 9 MG/ML
INJECTION, SOLUTION INTRAVENOUS CONTINUOUS
Status: DISCONTINUED | OUTPATIENT
Start: 2025-05-23 | End: 2025-05-23 | Stop reason: HOSPADM

## 2025-05-23 RX ORDER — LORAZEPAM 1 MG/1
0.5 TABLET ORAL PRN
Status: DISCONTINUED | OUTPATIENT
Start: 2025-05-23 | End: 2025-05-23 | Stop reason: HOSPADM

## 2025-05-23 RX ORDER — LORAZEPAM 1 MG/1
0.5 TABLET ORAL PRN
Status: CANCELLED | OUTPATIENT
Start: 2025-05-23

## 2025-05-23 RX ORDER — DIPHENHYDRAMINE HYDROCHLORIDE 50 MG/ML
25 INJECTION, SOLUTION INTRAMUSCULAR; INTRAVENOUS PRN
Status: CANCELLED | OUTPATIENT
Start: 2025-05-23

## 2025-05-23 RX ORDER — LORAZEPAM 0.5 MG/1
0.5 TABLET ORAL PRN
Status: CANCELLED | OUTPATIENT
Start: 2025-05-23

## 2025-05-23 RX ORDER — ACETAMINOPHEN 325 MG/1
650 TABLET ORAL PRN
Status: DISCONTINUED | OUTPATIENT
Start: 2025-05-23 | End: 2025-05-23 | Stop reason: HOSPADM

## 2025-05-23 RX ORDER — ACETAMINOPHEN 325 MG/1
650 TABLET ORAL ONCE
Status: COMPLETED | OUTPATIENT
Start: 2025-05-23 | End: 2025-05-23

## 2025-05-23 RX ORDER — PALONOSETRON 0.05 MG/ML
0.25 INJECTION, SOLUTION INTRAVENOUS ONCE
Status: COMPLETED | OUTPATIENT
Start: 2025-05-23 | End: 2025-05-23

## 2025-05-23 RX ADMIN — DOXORUBICIN HYDROCHLORIDE 46 MG: 2 INJECTION, SOLUTION INTRAVENOUS at 14:52

## 2025-05-23 RX ADMIN — SODIUM CHLORIDE 500 ML: 9 INJECTION, SOLUTION INTRAVENOUS at 10:00

## 2025-05-23 RX ADMIN — DIPHENHYDRAMINE HYDROCHLORIDE 25 MG: 50 INJECTION INTRAMUSCULAR; INTRAVENOUS at 09:25

## 2025-05-23 RX ADMIN — VINCRISTINE SULFATE 1 MG: 1 INJECTION, SOLUTION INTRAVENOUS at 15:38

## 2025-05-23 RX ADMIN — SODIUM CHLORIDE 700 MG: 9 INJECTION, SOLUTION INTRAVENOUS at 10:30

## 2025-05-23 RX ADMIN — PREDNISONE 73.5 MG: 50 TABLET ORAL at 10:30

## 2025-05-23 RX ADMIN — CYCLOPHOSPHAMIDE 750 MG: 200 INJECTION, SOLUTION INTRAVENOUS at 14:06

## 2025-05-23 RX ADMIN — PALONOSETRON HYDROCHLORIDE 0.25 MG: 0.25 INJECTION INTRAVENOUS at 09:22

## 2025-05-23 RX ADMIN — ACETAMINOPHEN 650 MG: 325 TABLET ORAL at 09:20

## 2025-05-23 RX ADMIN — FOSAPREPITANT 150 MG: 150 INJECTION, POWDER, LYOPHILIZED, FOR SOLUTION INTRAVENOUS at 09:45

## 2025-05-23 RX ADMIN — SODIUM CHLORIDE 1000 MG: 9 INJECTION, SOLUTION INTRAVENOUS at 16:10

## 2025-05-23 ASSESSMENT — FIBROSIS 4 INDEX: FIB4 SCORE: 1.26

## 2025-05-23 NOTE — PROGRESS NOTES
Chemotherapy Verification - PRIMARY RN      Height = 1.72m  Weight = 70.9kg  BSA = 1.84m2       Medication: rituximab-pvvr  Dose: 375mg/m2 x 1.84m2  Calculated Dose: 690mg                             (In mg/m2, AUC, mg/kg)     Medication: cyclophosphamide  Dose: 400mg/m2 x 1.84m2  Calculated Dose: 736mg                             (In mg/m2, AUC, mg/kg)    Medication: doxorubicin  Dose: 25mg/m2 x1.84m2  Calculated Dose: 46mg                             (In mg/m2, AUC, mg/kg)    Medication: vincristine  Dose: 1mg fixed dose  Calculated Dose: 1 mg fixed dose                             (In mg/m2, AUC, mg/kg)        I confirm this process was performed independently with the BSA and all final chemotherapy dosing calculations congruent.  Any discrepancies of 10% or greater have been addressed with the chemotherapy pharmacist. The resolution of the discrepancy has been documented in the EPIC progress notes.

## 2025-05-23 NOTE — PROGRESS NOTES
Arya ([place]) presents to infusion for cycle 3 / day 1 of mini R-CHOP and Iron Dextran for Non-Hodgkins Lymphoma. Pt denies having any new or acute complaints today, still having fatigue, otherwise reports tolerating past treatments well.     Port accessed using sterile technique, flushed with NS with positive blood return and labs drawn prior to arrival. Labs reviewed by RN, within parameters for treatment today.     Pre-treatment meds given as ordered.     Rituximab-pvvr given over 3 hours and 20 min.  Cyclophosphamide given over 30 min.  Doxorubicin given over 20 min.  Oncovin given over 5 min.  Patient tolerated all well with no adverse effects.     Education provided to patient and daughter on iron dextran side effects and s/s of allergic reaction to report, patient verbalized understanding, handout provided.     Iron labs from today reviewed by RN and pharmacy, within parameters for treatment today.     No h/o asthma or multiple drug allergies, no Solu-Medrol required.     Iron dextran given per MAR starting with 5 minute test dose, then pausing infusion for 10 minutes. Patient tolerated test dose with no adverse effects so remaining iron dextran given over 45 minutes, patient tolerated well.     Port flushed post infusion with positive blood return and d/brittney per protocol, bandaid applied    Patient left in stable condition, ambulatory with daughter, no future appointments needed at this time

## 2025-05-23 NOTE — PROGRESS NOTES
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  Millicent F et al. Lancet Oncol. 2011;12(5):460-8.  Al-Anahi D, et al. Am J Hematol. 2024;99(2):216-222.    Allergies:  Penicillins     BP (!) 140/70   Pulse (!) 102   Temp 36.1 °C (97 °F) (Temporal)   Resp 18   Ht 1.72 m (5' 7.72\")   Wt 70.9 kg (156 lb 4.9 oz)   SpO2 96%   BMI 23.97 kg/m²  Body surface area is 1.84 meters squared.    Labs 5/22/25  ANC~ 2400 Plt = 384k   Hgb = 8.9     SCr = 0.71 mg/dL CrCl ~ 86.8 mL/min   LFT's = WNLs  TBili = <0.2      03/31/25 15:05   Hepatitis B Surface Antigen Non-Reactive   Hepatitis B Cors Ab,IgM Non-Reactive     Drug Order   (Drug name, dose, route, IV Fluid & volume, frequency, number of doses) Cycle 3  Previous treatment: C2 5/2/25     Medication = rituximab-pvvr  Base Dose = 375 mg/m2  Calc Dose: Base Dose x 1.84 m² = 690 mg  Final Dose = 700 mg  Route = IV  Fluid & Volume =  mL  Admin Duration = see rituximab rate sheet            <10% difference, okay to treat with final dose     Medication = cyclophosphamide  Base Dose = 400 mg/m2  Calc Dose: Base Dose x 1.84 m² = 736 mg  Final Dose = 750 mg  Route = IV  Fluid & Volume =  mL  Admin Duration = Over 30 minutes            <10% difference, okay to treat with final dose     Medication = doxorubicin  Base Dose = 25 mg/m2  Calc Dose:Base Dose x 1.84 m² = 46 mg  Final Dose = 46 mg  Route = IV  Fluid & Volume = 2 mg/mL; 23 mL empty bag  Admin Duration = Over 20 " minutes            <10% difference, okay to treat with final dose     Medication = vincristine  Base Dose = 1 mg  Fixed dose; no calc required  Final Dose = 1 mg  Route = IV  Fluid & Volume = NS 25 mL  Admin Duration = Over 5-10 minutes            Fixed dose, okay to treat with final dose     By my signature below, I confirm this process was performed independently with the BSA and all final chemotherapy dosing calculations congruent. I have reviewed the above chemotherapy order and that my calculation of the final dose and BSA (when applicable) corroborate those calculations of the  pharmacist. Discrepancies of 10% or greater in the written dose have been addressed and documented within the EPIC Progress notes.    Rogerio Elias, PharmD

## 2025-05-23 NOTE — PROGRESS NOTES
Patient and daughter asking about recommendations for Hgb support  - Lower counts are directly affected by treatment but...  - referral placed to oncology nutrition services  - TIBC ordered with O2 sat and iron very low - iron dex will be ordered for additional support      No visits with results within 1 Day(s) from this visit.   Latest known visit with results is:   Hospital Outpatient Visit on 05/22/2025   Component Date Value Ref Range Status    WBC 05/22/2025 4.4 (L)  4.8 - 10.8 K/uL Final    RBC 05/22/2025 3.74 (L)  4.70 - 6.10 M/uL Final    Hemoglobin 05/22/2025 8.9 (L)  14.0 - 18.0 g/dL Final    Hematocrit 05/22/2025 29.7 (L)  42.0 - 52.0 % Final    MCV 05/22/2025 79.4 (L)  81.4 - 97.8 fL Final    MCH 05/22/2025 23.8 (L)  27.0 - 33.0 pg Final    MCHC 05/22/2025 30.0 (L)  32.3 - 36.5 g/dL Final    RDW 05/22/2025 56.0 (H)  35.9 - 50.0 fL Final    Platelet Count 05/22/2025 384  164 - 446 K/uL Final    MPV 05/22/2025 9.2  9.0 - 12.9 fL Final    Neutrophils-Polys 05/22/2025 55.00  44.00 - 72.00 % Final    Lymphocytes 05/22/2025 16.20 (L)  22.00 - 41.00 % Final    Monocytes 05/22/2025 23.60 (H)  0.00 - 13.40 % Final    Eosinophils 05/22/2025 3.40  0.00 - 6.90 % Final    Basophils 05/22/2025 1.60  0.00 - 1.80 % Final    Immature Granulocytes 05/22/2025 0.20  0.00 - 0.90 % Final    Nucleated RBC 05/22/2025 0.00  0.00 - 0.20 /100 WBC Final    Neutrophils (Absolute) 05/22/2025 2.40  1.82 - 7.42 K/uL Final    Includes immature neutrophils, if present.    Lymphs (Absolute) 05/22/2025 0.71 (L)  1.00 - 4.80 K/uL Final    Monos (Absolute) 05/22/2025 1.03 (H)  0.00 - 0.85 K/uL Final    Eos (Absolute) 05/22/2025 0.15  0.00 - 0.51 K/uL Final    Baso (Absolute) 05/22/2025 0.07  0.00 - 0.12 K/uL Final    Immature Granulocytes (abs) 05/22/2025 0.01  0.00 - 0.11 K/uL Final    NRBC (Absolute) 05/22/2025 0.00  K/uL Final    Sodium 05/22/2025 135  135 - 145 mmol/L Final    Potassium 05/22/2025 5.0  3.6 - 5.5 mmol/L Final    Chloride  05/22/2025 103  96 - 112 mmol/L Final    Co2 05/22/2025 22  20 - 33 mmol/L Final    Anion Gap 05/22/2025 10.0  7.0 - 16.0 Final    Glucose 05/22/2025 110 (H)  65 - 99 mg/dL Final    Bun 05/22/2025 29 (H)  8 - 22 mg/dL Final    Creatinine 05/22/2025 0.71  0.50 - 1.40 mg/dL Final    Calcium 05/22/2025 10.0  8.5 - 10.5 mg/dL Final    Correct Calcium 05/22/2025 10.3  8.5 - 10.5 mg/dL Final    AST(SGOT) 05/22/2025 26  12 - 45 U/L Final    ALT(SGPT) 05/22/2025 17  2 - 50 U/L Final    Alkaline Phosphatase 05/22/2025 96  30 - 99 U/L Final    Total Bilirubin 05/22/2025 <0.2  0.1 - 1.5 mg/dL Final    Albumin 05/22/2025 3.6  3.2 - 4.9 g/dL Final    Total Protein 05/22/2025 6.3  6.0 - 8.2 g/dL Final    Globulin 05/22/2025 2.7  1.9 - 3.5 g/dL Final    A-G Ratio 05/22/2025 1.3  g/dL Final    Uric Acid 05/22/2025 2.5  2.5 - 8.3 mg/dL Final    LDH Total 05/22/2025 228  107 - 266 U/L Final    Phosphorus 05/22/2025 3.2  2.5 - 4.5 mg/dL Final    GFR (CKD-EPI) 05/22/2025 94  >60 mL/min/1.73 m 2 Final    Comment: Estimated Glomerular Filtration Rate is calculated using  race neutral CKD-EPI 2021 equation per NKF-ASN recommendations.      Iron 05/22/2025 10 (L)  50 - 180 ug/dL Final    Total Iron Binding 05/22/2025 302  250 - 450 ug/dL Final    Unsat Iron Binding 05/22/2025 292  110 - 370 ug/dL Final    % Saturation 05/22/2025 3 (L)  15 - 55 % Final

## 2025-05-23 NOTE — PROGRESS NOTES
Chemotherapy Verification - SECONDARY RN       Height = 1.72m  Weight = 70.9kg  BSA = 1.84m2       Medication: riTUXimab-pvvr (Ruxience)  Dose: 375mg/m2  Calculated Dose: 690mg                             (In mg/m2, AUC, mg/kg)     Medication: cyclophosphamide (Cytoxan)  Dose: 400mg/m2  Calculated Dose: 736mg                             (In mg/m2, AUC, mg/kg)    Medication: DOXOrubicin (Adriamycin)  Dose: 25mg/m2  Calculated Dose: 46mg                             (In mg/m2, AUC, mg/kg)    Medication: vinCRIStine (Oncovin)  Dose: 1mg set dose  Calculated Dose: 1mg set dose                             (In mg/m2, AUC, mg/kg)      I confirm that this process was performed independently.

## 2025-05-26 RX ORDER — SODIUM CHLORIDE 9 MG/ML
1000 INJECTION, SOLUTION INTRAVENOUS PRN
OUTPATIENT
Start: 2025-05-26

## 2025-05-26 RX ORDER — METHYLPREDNISOLONE SODIUM SUCCINATE 125 MG/2ML
125 INJECTION, POWDER, LYOPHILIZED, FOR SOLUTION INTRAMUSCULAR; INTRAVENOUS PRN
OUTPATIENT
Start: 2025-05-26

## 2025-05-26 RX ORDER — LORAZEPAM 0.5 MG/1
0.5 TABLET ORAL PRN
OUTPATIENT
Start: 2025-05-26

## 2025-05-26 RX ORDER — ONDANSETRON 8 MG/1
8 TABLET, ORALLY DISINTEGRATING ORAL PRN
OUTPATIENT
Start: 2025-05-26

## 2025-05-26 RX ORDER — EPINEPHRINE 1 MG/ML(1)
0.5 AMPUL (ML) INJECTION PRN
OUTPATIENT
Start: 2025-05-26

## 2025-05-26 RX ORDER — MEPERIDINE HYDROCHLORIDE 25 MG/ML
25 INJECTION INTRAMUSCULAR; INTRAVENOUS; SUBCUTANEOUS PRN
OUTPATIENT
Start: 2025-05-26

## 2025-05-26 RX ORDER — SODIUM CHLORIDE 9 MG/ML
INJECTION, SOLUTION INTRAVENOUS CONTINUOUS
OUTPATIENT
Start: 2025-05-26

## 2025-05-26 RX ORDER — ONDANSETRON 2 MG/ML
8 INJECTION INTRAMUSCULAR; INTRAVENOUS PRN
OUTPATIENT
Start: 2025-05-26

## 2025-05-26 RX ORDER — DIPHENHYDRAMINE HYDROCHLORIDE 50 MG/ML
25 INJECTION, SOLUTION INTRAMUSCULAR; INTRAVENOUS PRN
OUTPATIENT
Start: 2025-05-26

## 2025-05-26 RX ORDER — ALBUTEROL SULFATE 5 MG/ML
2.5 SOLUTION RESPIRATORY (INHALATION) PRN
OUTPATIENT
Start: 2025-05-26

## 2025-05-26 RX ORDER — ACETAMINOPHEN 325 MG/1
650 TABLET ORAL PRN
OUTPATIENT
Start: 2025-05-26

## 2025-05-26 RX ORDER — LORAZEPAM 1 MG/1
1 TABLET ORAL EVERY 4 HOURS PRN
Start: 2025-05-26

## 2025-05-30 ENCOUNTER — TELEPHONE (OUTPATIENT)
Dept: HEMATOLOGY ONCOLOGY | Facility: MEDICAL CENTER | Age: 75
End: 2025-05-30
Payer: MEDICARE

## 2025-05-30 NOTE — TELEPHONE ENCOUNTER
"Nutrition Services: RD Consultation   Arya Gamez is a 75 y.o. male with diagnosis of gastric lymphoma.       Nutrition Assessment:      RD able to introduce self and nutrition services.    Pt reports no recent change in appetite, has hunger. Pt reports eating two meals per day. Pt reports primarily consuming chicken, little bit of red meat and fish. Pt reports drinking an Ensure (original, plus, and high protein) two to three per day. Pt reports no N/V/D/C. Pt reports constipation resolved with Miralax. Pt reports no difficulty chewing and/or allegories. Pt reports NKFA. Pt reports UBW 155lb. Pt weighed 145lb and then started to gain weight after first chemotherapy treatment. Pt reports no nutrition related questions and/or concerns at time of assessment.     Food/ Nutrition-related History:  Allergies/ Intolerances    Past Medical History[1]    Past Surgical History[2]    Biochemical/ Anthropometrics  Treatment Regimen: riTUXimab + CHOP  Wt Readings from Last 1 Encounters:   05/23/25 70.9 kg (156 lb 4.9 oz)      Ht Readings from Last 1 Encounters:   05/23/25 1.72 m (5' 7.72\")      BMI Readings from Last 1 Encounters:   05/23/25 23.97 kg/m²   BMI Classification: WNL    Nutrition-Focused Physical Exam: Did not occur       Weight History:  Wt Readings from Last 6 Encounters:   05/23/25 70.9 kg (156 lb 4.9 oz)   05/22/25 70.4 kg (155 lb 3.2 oz)   05/02/25 71.3 kg (157 lb 3 oz)   05/01/25 69.5 kg (153 lb 3.2 oz)   04/11/25 71.3 kg (157 lb 3 oz)   04/08/25 70.3 kg (155 lb)        Nutrition Diagnosis:      Increased nutrient needs related to increase demand as evidenced by lymphoma with treatment.          Nutrition Interventions:      Introduced self and discussed role of dietitian throughout treatment process   Discussed general nutrition guidelines as well as nutritional recommendations for patient's with  cancer, including tips/tricks should side effects of tx occur.   Encouraged balanced diet and " importance of nutrition throughout treatment.   Increase meal and snack frequency to 4-6 x/day.   Focus on high protein foods, fruits and vegetables with all meals/snacks - handout provided.  Incorporate Ensure Plus or comparable protein supplement .  RD provided contact information. Encouraged pt to reach out as questions/concerns arise.     Nutrition Monitoring and Evaluation:     Dietary pattern as prescribed by RD  Weight stabilization throughout treatment  Appropriate management of side effects through medication management and nutrition modification as warranted      Pt reports understanding and was receptive to information provided.   RD available PRN   [phone]        [1]   Past Medical History:  Diagnosis Date    Anesthesia     Arthritis     knees, wrists    Cancer (HCC)     Delayed emergence from general anesthesia     Dental disorder     upper partial    Disorder of thyroid     High cholesterol     Pain     Urinary incontinence    [2]   Past Surgical History:  Procedure Laterality Date    [facility] UPPER GI ENDOSCOPY,DIAGNOSIS N/A 3/18/2025    Procedure: GASTROSCOPY WITH BIOPSY;  Surgeon: Gricelda Coombs M.D.;  Location: SURGERY SAME DAY [facility];  Service: Gastroenterology    TURP BUTTON N/A 6/11/2021    Procedure: TURP, USING BUTTON ELECTRODE-BIPOLAR.;  Surgeon: Massimo Gee M.D.;  Location: SURGERY [facility];  Service: Urology    HERNIA REPAIR  2003    MENISCUS REPAIR Left 1978

## 2025-06-02 DIAGNOSIS — C96.A HISTIOCYTIC SARCOMA (HCC): ICD-10-CM

## 2025-06-02 DIAGNOSIS — C85.99 GASTRIC LYMPHOMA (HCC): ICD-10-CM

## 2025-06-02 DIAGNOSIS — Z79.899 HIGH RISK MEDICATION USE: ICD-10-CM

## 2025-06-02 RX ORDER — ALLOPURINOL 300 MG/1
300 TABLET ORAL DAILY
Qty: 30 TABLET | Refills: 3 | Status: SHIPPED | OUTPATIENT
Start: 2025-06-02

## 2025-06-03 ENCOUNTER — PATIENT OUTREACH (OUTPATIENT)
Dept: ONCOLOGY | Facility: MEDICAL CENTER | Age: 75
End: 2025-06-03
Payer: MEDICARE

## 2025-06-03 NOTE — PROGRESS NOTES
On Dhara 3, 2025, , Flavia Lozano, spoke to pt. via telephone. MIRA Lozano introduced self and explained the role of the support services team at the [facility] [place]. Pt. denied any barriers to treatment at this time. Pt. is living with his daughter and family  in [place] at this time. Pt. is able to get to his appointments. Pt. denied concerns for housing, finances, or obtaining food or prescriptions. Pt. denied needing mental health support. MIRA Lozano sent pt. contact information in April and encouraged pt. to reach out to social work or his care team if he needs further information or support.

## 2025-06-09 ENCOUNTER — APPOINTMENT (OUTPATIENT)
Facility: MEDICAL CENTER | Age: 75
End: 2025-06-09
Payer: MEDICARE

## 2025-06-23 ENCOUNTER — PROCEDURE VISIT (OUTPATIENT)
Dept: UROLOGY | Facility: MEDICAL CENTER | Age: 75
End: 2025-06-23
Payer: MEDICARE

## 2025-06-23 ENCOUNTER — PHARMACY VISIT (OUTPATIENT)
Dept: PHARMACY | Facility: MEDICAL CENTER | Age: 75
End: 2025-06-23
Payer: MEDICARE

## 2025-06-23 DIAGNOSIS — N13.8 BPH WITH OBSTRUCTION/LOWER URINARY TRACT SYMPTOMS: Primary | ICD-10-CM

## 2025-06-23 DIAGNOSIS — N40.1 BPH WITH OBSTRUCTION/LOWER URINARY TRACT SYMPTOMS: Primary | ICD-10-CM

## 2025-06-23 DIAGNOSIS — N35.914 ANTERIOR URETHRAL STRICTURE: ICD-10-CM

## 2025-06-23 LAB
APPEARANCE UR: CLEAR
BILIRUB UR STRIP-MCNC: NORMAL MG/DL
COLOR UR AUTO: YELLOW
GLUCOSE UR STRIP.AUTO-MCNC: NORMAL MG/DL
KETONES UR STRIP.AUTO-MCNC: NORMAL MG/DL
LEUKOCYTE ESTERASE UR QL STRIP.AUTO: NORMAL
NITRITE UR QL STRIP.AUTO: NORMAL
PH UR STRIP.AUTO: 5.5 [PH] (ref 5–8)
POC POST-VOID: 67 ML
POC PRE-VOID: NORMAL
PROT UR QL STRIP: 30 MG/DL
RBC UR QL AUTO: NORMAL
SP GR UR STRIP.AUTO: 1.02
UROBILINOGEN UR STRIP-MCNC: 0.2 MG/DL

## 2025-06-23 PROCEDURE — 52000 CYSTOURETHROSCOPY: CPT | Performed by: UROLOGY

## 2025-06-23 PROCEDURE — 81002 URINALYSIS NONAUTO W/O SCOPE: CPT | Performed by: UROLOGY

## 2025-06-23 PROCEDURE — RXMED WILLOW AMBULATORY MEDICATION CHARGE: Performed by: UROLOGY

## 2025-06-23 PROCEDURE — RXOTC WILLOW AMBULATORY OTC CHARGE

## 2025-06-23 PROCEDURE — 51798 US URINE CAPACITY MEASURE: CPT | Performed by: UROLOGY

## 2025-06-23 RX ORDER — FINASTERIDE 5 MG/1
5 TABLET, FILM COATED ORAL DAILY
Qty: 90 TABLET | Refills: 3 | Status: SHIPPED | OUTPATIENT
Start: 2025-06-23 | End: 2025-06-23

## 2025-06-23 RX ORDER — FINASTERIDE 5 MG/1
5 TABLET, FILM COATED ORAL DAILY
Qty: 90 TABLET | Refills: 3 | Status: SHIPPED | OUTPATIENT
Start: 2025-06-23

## 2025-06-23 NOTE — PROGRESS NOTES
Flexible Cystoscopy Report    Patient name: Arya Gamez    Age: 75 y.o.    Procedure: Flexible cystourethroscopy    Pre-procedure diagnosis: History of urethral stricture, urinary retention    Post-procedure diagnosis: Mild proximal bulbous stricture; prostate regrowth    Procedure indications: Arya Gamez is a 75 y.o. male with a history of BPH s/p TURP in 2021, with a subsequent urethral stricture treated with DVIU, and most recently urinary retention in April 2025 requiring catheterization. Here today after a period of urethral rest to evaluate his urethra.     Procedure details: After providing a urine sample for a urinalysis to rule out active urinary tract infection, the patient was brought to the procedure room and placed in supine position. The external genitalia were then prepped and draped in usual sterile fashion. Lidocaine jelly was administered via the urethral meatus and held in place for approximately two minutes for local anesthesia.    After a procedure time-out to confirm the proper patient, procedure, consent, and availability of all necessary equipment, the case began by inserting a 16-Kyrgyz flexible cystoscope via the urethral meatus. Findings included:    Anterior urethra: normal fossa navicularis, pendulous urethra, and most of bulbous urethra though there is a mild stenosis of the proximal bulbous urethra of 0.5 to 1 cm in length. The cystoscope passes this area without difficulty or manipulation.   Posterior urethra: Normal membranous urethra; prostatic urethra demonstrates significant regrowth of the left lobe of the prostate which fills most of the prostatic fossa; bladder neck is wide open as seen with retroflexion of the cystoscope.  Bladder: Trabeculation throughout bladder, and a few small diverticula.  Bladder mucosa normal without masses. No stones or other foreign bodies noted. Bilateral ureteral orifices were identified and found to have efflux of clear  yellow urine.     The cystoscope was then carefully withdrawn from the bladder and urethra. The patient was cleaned and dried and allowed to void.     PVR: 67 ml    Assessment/Plan:  Recurrent obstruction and episode of retention most likely secondary to prostatic regrowth, though he's emptying his bladder well with a minimal PVR today. He feels well at this time without significant bother. Discussed options including observation, starting a 5ARI, or re-TURP. He would like to start taking finasteride, and will follow up in 6 months. He'll call if he has any increasing difficulty or bother prior to that appointment.    Melchor Queen MD

## 2025-06-25 DIAGNOSIS — K25.4 GASTROINTESTINAL HEMORRHAGE ASSOCIATED WITH GASTRIC ULCER: ICD-10-CM

## 2025-06-25 RX ORDER — OMEPRAZOLE 40 MG/1
40 CAPSULE, DELAYED RELEASE ORAL 2 TIMES DAILY
Qty: 90 CAPSULE | Refills: 1 | Status: SHIPPED | OUTPATIENT
Start: 2025-06-25 | End: 2025-09-23

## 2025-06-26 ENCOUNTER — HOSPITAL ENCOUNTER (OUTPATIENT)
Dept: RADIOLOGY | Facility: MEDICAL CENTER | Age: 75
End: 2025-06-26
Attending: NURSE PRACTITIONER
Payer: MEDICARE

## 2025-06-26 DIAGNOSIS — C85.99 GASTRIC LYMPHOMA (HCC): ICD-10-CM

## 2025-06-26 LAB — GLUCOSE BLD-MCNC: 90 MG/DL (ref 65–99)

## 2025-06-26 PROCEDURE — A9552 F18 FDG: HCPCS

## 2025-06-27 ENCOUNTER — HOSPITAL ENCOUNTER (OUTPATIENT)
Dept: HEMATOLOGY ONCOLOGY | Facility: MEDICAL CENTER | Age: 75
End: 2025-06-27
Attending: STUDENT IN AN ORGANIZED HEALTH CARE EDUCATION/TRAINING PROGRAM
Payer: MEDICARE

## 2025-06-27 VITALS
DIASTOLIC BLOOD PRESSURE: 64 MMHG | SYSTOLIC BLOOD PRESSURE: 124 MMHG | HEART RATE: 91 BPM | WEIGHT: 152 LBS | BODY MASS INDEX: 23.04 KG/M2 | TEMPERATURE: 97.7 F | OXYGEN SATURATION: 95 % | HEIGHT: 68 IN

## 2025-06-27 DIAGNOSIS — C85.99 GASTRIC LYMPHOMA (HCC): Primary | ICD-10-CM

## 2025-06-27 PROCEDURE — 99212 OFFICE O/P EST SF 10 MIN: CPT | Performed by: STUDENT IN AN ORGANIZED HEALTH CARE EDUCATION/TRAINING PROGRAM

## 2025-06-27 ASSESSMENT — ENCOUNTER SYMPTOMS
CONSTIPATION: 0
VOMITING: 0
HEADACHES: 0
WEAKNESS: 0
SHORTNESS OF BREATH: 0
DIARRHEA: 0
ORTHOPNEA: 0
CHILLS: 0
DOUBLE VISION: 0
SPUTUM PRODUCTION: 0
FEVER: 0
INSOMNIA: 0
BACK PAIN: 0
DIZZINESS: 0
WHEEZING: 0
MYALGIAS: 0
SORE THROAT: 0
NAUSEA: 0
BLURRED VISION: 0
COUGH: 0
NERVOUS/ANXIOUS: 0
HEARTBURN: 0
WEIGHT LOSS: 0
PALPITATIONS: 0
SINUS PAIN: 0
BRUISES/BLEEDS EASILY: 0
TINGLING: 0
ABDOMINAL PAIN: 0
DIAPHORESIS: 0
BLOOD IN STOOL: 0

## 2025-06-27 ASSESSMENT — FIBROSIS 4 INDEX: FIB4 SCORE: 1.26

## 2025-06-27 NOTE — PROGRESS NOTES
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  man who presents to the clinic for evaluation for systemic therapy for a new diagnosis of gastric B-cell lymphoma. The patient presented to the hospital as a transfer for severe anemia and gastric ulcers. He had presented to an outside hospital with weakness and falls. He had a TKA done a few weeks ago and was taking aspirin for prophylaxis as well as ibuprofen for pain. He required several transfusions and EGD revealed a large 6 cm ulcer with numerous surrounding ulcers in the stomach. Biopsy revealed a preliminary diagnosis of gastric B cell lymphoma, though further testing is pending. The patient was stabilized and discharged from the hospital and presents for evaluation.     Treatment History:   04/11/25: C1 mini R-CHOP  05/02/25: C2 mini R-CHOP  05/23/25: C3 mini R-CHOP  06/26/25: PET scan shows no residual disease    Interval History:  Patient is here for follow up visit. He is doing well aside from chronic knee problems.     Allergies as of 06/27/2025 - Reviewed 06/27/2025   Allergen Reaction Noted    Penicillins  05/26/2021         Current Outpatient Medications:     omeprazole (PRILOSEC) 40 MG delayed-release capsule, Take 1 Capsule by mouth 2 times a day for 90 days., Disp: 90 Capsule, Rfl: 1    finasteride (PROSCAR) 5 MG Tab, Take 1 Tablet by mouth every day., Disp: 90 Tablet, Rfl: 3    allopurinol (ZYLOPRIM) 300 MG Tab, Take 1 Tablet by mouth every day., Disp: 30 Tablet, Rfl: 3    predniSONE (DELTASONE) 20 MG Tab, Take 3.5 Tablets by mouth every day. on days 1-5 of each treatment cycle., Disp: 30  Tablet, Rfl: 0    docosahexanoic acid (OMEGA 3 FA) 1000 MG Cap, , Disp: , Rfl:     Cholecalciferol (VITAMIN D-3) 25 MCG (1000 UT) Cap, , Disp: , Rfl:     ondansetron (ZOFRAN) 4 MG Tab tablet, Take 1 Tablet by mouth every four hours as needed for Nausea/Vomiting., Disp: 20 Tablet, Rfl: 3    prochlorperazine (COMPAZINE) 10 MG Tab, Take 1 Tablet by mouth every 6 hours as needed for Nausea/Vomiting., Disp: 30 Tablet, Rfl: 3    lidocaine-prilocaine (EMLA) 2.5-2.5 % Cream, Apply to port 1 hour prior to access of port and cover with plastic wrap., Disp: 30 g, Rfl: 3    OLANZapine (ZYPREXA) 5 MG Tab, Take 1 tablet by mouth nightly on days 1-4 of each chemotherapy cycle., Disp: 4 Tablet, Rfl: 3    B Complex Vitamins (VITAMIN B COMPLEX PO), Take  by mouth., Disp: , Rfl:     Saccharomyces boulardii (PROBIOTIC) 250 MG Cap, Take  by mouth., Disp: , Rfl:     tamsulosin (FLOMAX) 0.4 MG capsule, Take 2 Capsules by mouth every day., Disp: 30 Capsule, Rfl: 0    atorvastatin (LIPITOR) 10 MG Tab, , Disp: , Rfl:     Glucosamine HCl (GLUCOSAMINE PO), Take 1 Dose by mouth every day., Disp: , Rfl:     acetaminophen (TYLENOL) 500 MG Tab, Take 500-1,000 mg by mouth every 6 hours as needed., Disp: , Rfl:       Review of Systems:  Review of Systems   Constitutional:  Negative for chills, diaphoresis, fever, malaise/fatigue and weight loss.   HENT:  Negative for hearing loss, nosebleeds, sinus pain and sore throat.    Eyes:  Negative for blurred vision and double vision.   Respiratory:  Negative for cough, sputum production, shortness of breath and wheezing.    Cardiovascular:  Negative for chest pain, palpitations, orthopnea and leg swelling.   Gastrointestinal:  Negative for abdominal pain, blood in stool, constipation, diarrhea, heartburn, melena, nausea and vomiting.   Genitourinary:  Negative for dysuria, frequency, hematuria and urgency.   Musculoskeletal:  Positive for joint pain (R knee). Negative for back pain and myalgias.   Skin:   "Negative for rash.   Neurological:  Negative for dizziness, tingling, weakness and headaches.   Endo/Heme/Allergies:  Does not bruise/bleed easily.   Psychiatric/Behavioral:  The patient is not nervous/anxious and does not have insomnia.          Physical Exam:  Vitals:    06/27/25 1306   BP: 124/64   Pulse: 91   Temp: 36.5 °C (97.7 °F)   TempSrc: Temporal   SpO2: 95%   Weight: 68.9 kg (152 lb)   Height: 1.72 m (5' 7.72\")       DESC; KARNOFSKY SCALE WITH ECOG EQUIVALENT: 70, Cares for self; unable to carry on normal activity or to do active work (ECOG equivalent 1)    DISTRESS LEVEL: no apparent distress    Physical Exam  Vitals and nursing note reviewed.   Constitutional:       General: He is awake. He is not in acute distress.     Appearance: Normal appearance. He is normal weight. He is not ill-appearing, toxic-appearing or diaphoretic.   HENT:      Head: Normocephalic and atraumatic.      Nose: Nose normal. No congestion.      Mouth/Throat:      Pharynx: Oropharynx is clear. No oropharyngeal exudate or posterior oropharyngeal erythema.   Eyes:      General: No scleral icterus.     Extraocular Movements: Extraocular movements intact.      Conjunctiva/sclera: Conjunctivae normal.      Pupils: Pupils are equal, round, and reactive to light.   Cardiovascular:      Rate and Rhythm: Normal rate and regular rhythm.      Pulses: Normal pulses.      Heart sounds: Normal heart sounds. No murmur heard.     No friction rub. No gallop.   Pulmonary:      Effort: Pulmonary effort is normal.      Breath sounds: Normal breath sounds. No decreased air movement. No wheezing, rhonchi or rales.   Abdominal:      General: Bowel sounds are normal. There is no distension.      Tenderness: There is no abdominal tenderness.   Musculoskeletal:         General: No deformity. Normal range of motion.      Cervical back: Normal range of motion and neck supple. No tenderness.      Right lower leg: No edema.      Left lower leg: No edema. "   Lymphadenopathy:      Cervical: No cervical adenopathy.      Upper Body:      Right upper body: No axillary adenopathy.      Left upper body: No axillary adenopathy.      Lower Body: No right inguinal adenopathy. No left inguinal adenopathy.   Skin:     General: Skin is warm and dry.      Coloration: Skin is not jaundiced.      Findings: No erythema, lesion or rash.   Neurological:      General: No focal deficit present.      Mental Status: He is alert and oriented to person, place, and time.      Sensory: Sensation is intact.      Motor: Weakness present.      Gait: Gait abnormal.   Psychiatric:         Attention and Perception: Attention normal.         Mood and Affect: Mood normal.         Behavior: Behavior normal. Behavior is cooperative.         Thought Content: Thought content normal.         Judgment: Judgment normal.           Labs:  Hospital Outpatient Visit on 06/26/2025   Component Date Value Ref Range Status    Glucose - Accu-Ck 06/26/2025 90  65 - 99 mg/dL Final   Procedure visit on 06/23/2025   Component Date Value Ref Range Status    POC Color 06/23/2025 yellow  Negative Final    POC Appearance 06/23/2025 clear  Negative Final    POC Glucose 06/23/2025 neg  Negative mg/dL Final    POC Bilirubin 06/23/2025 neg  Negative mg/dL Final    POC Ketones 06/23/2025 neg  Negative mg/dL Final    POC Specific Gravity 06/23/2025 1.020  <1.005 - >1.030 Final    POC Blood 06/23/2025 neg  Negative Final    POC Urine PH 06/23/2025 5.5  5.0 - 8.0 Final    POC Protein 06/23/2025 30  Negative mg/dL Final    POC Urobiligen 06/23/2025 0.2  Negative (0.2) mg/dL Final    POC Nitrites 06/23/2025 neg  Negative Final    POC Leukocyte Esterase 06/23/2025 neg  Negative Final    POC Post-Void 06/23/2025 67  mL Final       Imaging:     All listed images below have been independently reviewed by me. I agree with the findings as summarized below:    [id number] BASE TO MID-THIGH  Result Date: 6/26/2025 6/26/2025 11:18 AM  HISTORY/REASON FOR EXAM:  gastric lymphoma post treatment imaging TECHNIQUE/EXAM DESCRIPTION AND NUMBER OF VIEWS: PET body imaging. Initially, 11.27 mCi F-18 FDG was administered intravenously under standardized conditions. Approximately 45 minutes after FDG administration, the patient was placed in the supine position on the PET CT table. Blood glucose level was 90 mg/dL. Low dose spiral CT imaging was performed from the skull base to the mid thighs. PET imaging was then performed from the skull base to the mid thighs. CT images, PET images, and PET/CT fused images were reviewed on a PACS 3D workstation. The limited non-contrast CT data are used primarily for attenuation correction and anatomic correlation.  Evaluation of solid organs and bowel are especially limited utilizing this technique. COMPARISON: 3/31/2025 FINDINGS: Head and neck: No abnormal focal FDG activity. Chest: No abnormal focal FDG activity. Abdomen and pelvis: No abnormal focal FDG activity. Musculoskeletal: No abnormal focal FDG activity. Incidental findings on CT: RIGHT chest infusion port.  Coronary artery calcifications.  Bilateral kidney cysts.  Postoperative change of the pelvis.  Enlarged prostate with probable prior transurethral resection.     No evidence of residual or recurrent lymphoma.      Pathology:  FINAL DIAGNOSIS:     A.     Gastric lesion, biopsy:              Small mature B-cell lymphoma (CD5-/CD10-), most suggestive   of marginal zone                     lymphoma, focally with features suggestive of   transformation into diffuse large B-                     cell lymphoma (see comment).               FISH for Non-Hodgkin lymphoma ([facility]: [id number]):   No abnormalities                     detected.     B.     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              Squamous epithelium with mild reactive epithelial changes.      Comment:   In part A, the tumor consists predominantly of  "small mature appearing   lymphocytes. However, the biopsy also shows ulceration with increased   number of larger transformed cells, suggestive of possible   transformation into diffuse large B-cell lymphoma. FISH results show no   evidence of \"double\" or \"triple hit\" lymphoma. There is insufficient   tissue available for additional studies including a Ki67 proliferation   index. A repeat biopsy may be considered if clinically indicated.   Correlation with clinical and other diagnostic information is   recommended..                                        Diagnosis performed by:                                       FRANCHESCA GRIMALDO     Assessment & Plan:  1. Gastric lymphoma (HCC)  Referral to Radiation Oncology        This is a 75 year old  man with gastric marginal zone lymphoma with diffuse large B cell transformation developing, stage IE disease. He presents for evaluation.      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 Follow up after completion of XRT. Referral to radiation oncology placed.      Treatment Plan: R-miniCHOP x 3 cycles followed by XRT     Treatment Citation: NCCN     Plan of Care:     Primary Therapy: Refer for XRT  Supportive Therapy: Medical management per primary  Toxicity: Patient is getting antineoplastic therapy for a life-threatening malignancy and needs monitoring of blood counts, hepatic function, and renal function due to potential for organ dysfunction. This treatment poses a risk of toxicity that could lead to long term disruption of bodily function or death.  Labs: CBC with diff, CMP, Uric acid, LDH, Phos monitoring.   Imaging: PET scan reviewed  Treatment Planning: Patient has marginal zone lymphoma with areas of transformation to DLBCL. Therefore, will treat with R-miniCHOP x 3 cycles and then XRT for " stage IE disease.   Consultations: GI (Dr. Coombs); Radiation oncology  Code Status: Full  Miscellaneous: NA  Return for Follow Up: After XRT    Any questions and concerns raised by the patient were answered to the best of my ability. Thank you for allowing me to participate in the care for this patient. Please feel free to contact me for any questions or concerns.

## 2025-06-30 NOTE — Clinical Note
REFERRAL APPROVAL NOTICE         Sent on June 30, 2025                   Andrew Gamez  [address] [facility] [address]                   Dear Mr. Gamez,    After a careful review of the medical information and benefit coverage, [facility] has processed your referral. See below for additional details.    If applicable, you must be actively enrolled with your insurance for coverage of the authorized service. If you have any questions regarding your coverage, please contact your insurance directly.    REFERRAL INFORMATION   Referral #:  [id number]  Referred-To Provider    Referred-By Provider:  Gastroenterology    Karsten Raza D.O.   [facility]      [address] [address]  [phone] [address] [facility] [address]  [phone]    Referral Start Date:  06/27/2025  Referral End Date:   06/27/2026             SCHEDULING  If you do not already have an appointment, please call [phone] to make an appointment.     MORE INFORMATION  If you do not already have a [employer] account, sign up at: [email or web address]  You can access your medical information, make appointments, see lab results, billing information, and more.  If you have questions regarding this referral, please contact  the [facility] Referrals department at:             [phone]. Monday - Friday 8:00AM - 5:00PM.     Sincerely,    [facility]

## 2025-06-30 NOTE — Clinical Note
REFERRAL APPROVAL NOTICE         Sent on June 30, 2025                   Andrew Gamez  [address] [facility] [address]                   Dear Mr. Gamez,    After a careful review of the medical information and benefit coverage, [facility] has processed your referral. See below for additional details.    If applicable, you must be actively enrolled with your insurance for coverage of the authorized service. If you have any questions regarding your coverage, please contact your insurance directly.    REFERRAL INFORMATION   Referral #:  [id number]  Referred-To Department    Referred-By Provider:  Radiation Oncology    Karsten Raza D.O.   [facility]      [address] [address]  [phone] [address] [place] [address]  [phone]    Referral Start Date:  06/27/2025  Referral End Date:   06/27/2026             SCHEDULING  If you do not already have an appointment, please call [phone] to make an appointment.     MORE INFORMATION  If you do not already have a [employer] account, sign up at: [email or web address]  You can access your medical information, make appointments, see lab results, billing information, and more.  If you have questions regarding this referral, please contact  the [facility] Referrals department at:             [phone]. Monday - Friday 8:00AM - 5:00PM.     Sincerely,    [facility]

## 2025-07-01 ENCOUNTER — APPOINTMENT (OUTPATIENT)
Dept: RADIOLOGY | Facility: MEDICAL CENTER | Age: 75
End: 2025-07-01
Attending: NURSE PRACTITIONER
Payer: MEDICARE

## 2025-07-14 ENCOUNTER — HOSPITAL ENCOUNTER (OUTPATIENT)
Dept: RADIOLOGY | Facility: MEDICAL CENTER | Age: 75
End: 2025-07-14
Payer: MEDICARE

## 2025-07-22 ENCOUNTER — HOSPITAL ENCOUNTER (OUTPATIENT)
Dept: RADIATION ONCOLOGY | Facility: MEDICAL CENTER | Age: 75
End: 2025-07-22

## 2025-07-22 ENCOUNTER — PATIENT OUTREACH (OUTPATIENT)
Dept: ONCOLOGY | Facility: MEDICAL CENTER | Age: 75
End: 2025-07-22
Payer: MEDICARE

## 2025-07-22 ENCOUNTER — HOSPITAL ENCOUNTER (OUTPATIENT)
Dept: RADIATION ONCOLOGY | Facility: MEDICAL CENTER | Age: 75
End: 2025-07-22
Attending: RADIOLOGY
Payer: MEDICARE

## 2025-07-22 ENCOUNTER — HOSPITAL ENCOUNTER (OUTPATIENT)
Dept: RADIOLOGY | Facility: MEDICAL CENTER | Age: 75
End: 2025-07-22
Attending: NURSE PRACTITIONER
Payer: MEDICARE

## 2025-07-22 VITALS
SYSTOLIC BLOOD PRESSURE: 120 MMHG | HEART RATE: 70 BPM | WEIGHT: 155.87 LBS | DIASTOLIC BLOOD PRESSURE: 60 MMHG | TEMPERATURE: 98.1 F | RESPIRATION RATE: 18 BRPM | HEIGHT: 67 IN | OXYGEN SATURATION: 96 % | BODY MASS INDEX: 24.46 KG/M2

## 2025-07-22 VITALS — SYSTOLIC BLOOD PRESSURE: 113 MMHG | HEART RATE: 82 BPM | DIASTOLIC BLOOD PRESSURE: 63 MMHG | OXYGEN SATURATION: 92 %

## 2025-07-22 DIAGNOSIS — C85.99 GASTRIC LYMPHOMA (HCC): ICD-10-CM

## 2025-07-22 DIAGNOSIS — Z95.828 PORT-A-CATH IN PLACE: ICD-10-CM

## 2025-07-22 DIAGNOSIS — C85.99 GASTRIC LYMPHOMA (HCC): Primary | ICD-10-CM

## 2025-07-22 PROCEDURE — 700101 HCHG RX REV CODE 250

## 2025-07-22 PROCEDURE — 77290 THER RAD SIMULAJ FIELD CPLX: CPT | Performed by: RADIOLOGY

## 2025-07-22 PROCEDURE — 77263 THER RADIOLOGY TX PLNG CPLX: CPT | Performed by: RADIOLOGY

## 2025-07-22 PROCEDURE — 32555 ASPIRATE PLEURA W/ IMAGING: CPT

## 2025-07-22 PROCEDURE — 99205 OFFICE O/P NEW HI 60 MIN: CPT | Mod: 25 | Performed by: RADIOLOGY

## 2025-07-22 PROCEDURE — 77470 SPECIAL RADIATION TREATMENT: CPT | Performed by: RADIOLOGY

## 2025-07-22 PROCEDURE — 77470 SPECIAL RADIATION TREATMENT: CPT | Mod: 26 | Performed by: RADIOLOGY

## 2025-07-22 PROCEDURE — 77334 RADIATION TREATMENT AID(S): CPT | Mod: 26 | Performed by: RADIOLOGY

## 2025-07-22 PROCEDURE — 99214 OFFICE O/P EST MOD 30 MIN: CPT | Mod: 25 | Performed by: RADIOLOGY

## 2025-07-22 PROCEDURE — 77334 RADIATION TREATMENT AID(S): CPT | Performed by: RADIOLOGY

## 2025-07-22 RX ORDER — SUCRALFATE 1 G/1
1 TABLET ORAL
Qty: 90 TABLET | Refills: 0 | Status: SHIPPED | OUTPATIENT
Start: 2025-07-22 | End: 2025-08-21

## 2025-07-22 RX ORDER — LIDOCAINE HYDROCHLORIDE AND EPINEPHRINE 10; 10 MG/ML; UG/ML
INJECTION, SOLUTION INFILTRATION; PERINEURAL
Status: COMPLETED
Start: 2025-07-22 | End: 2025-07-22

## 2025-07-22 RX ADMIN — LIDOCAINE HYDROCHLORIDE AND EPINEPHRINE: 10; 10 INJECTION, SOLUTION INFILTRATION; PERINEURAL at 08:43

## 2025-07-22 ASSESSMENT — PAIN SCALES - GENERAL: PAINLEVEL_OUTOF10: 2=MINIMAL-SLIGHT

## 2025-07-22 ASSESSMENT — FIBROSIS 4 INDEX: FIB4 SCORE: 1.26

## 2025-07-22 NOTE — CONSULTS
RADIATION ONCOLOGY CONSULT    DATE OF SERVICE: 7/22/2025    IDENTIFICATION: A 75 y.o. male with   Visit Diagnoses     ICD-10-CM   1. 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 on 3/28/2025  Stage prefix: Initial diagnosis    He is here at the kind request of Dr. 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  PET scan March 31, 2025 showed wall thickening in the gastric fundus corresponding mild increased activity likely related to patient's known gastric malignancy.  Patient had 3 cycles of mini R-CHOP and bone marrow biopsy showed no evidence of disease.  June 26, 2025 PET showed no evidence of residual recurrent lymphoma.  Overall feeling well after mini R-CHOP.    PAST MEDICAL HISTORY:  Past Medical History[1]    PAST SURGICAL HISTORY:  Past Surgical History[2]    CURRENT MEDICATIONS:  Current Medications[3]    ALLERGIES:    Penicillins    FAMILY HISTORY:    family history includes Breast Cancer in his sister; Cancer in his maternal grandfather.    SOCIAL HISTORY:     reports that he quit smoking about 51 years ago. His smoking use included cigarettes. He started smoking about 61 years ago. He has a 15 pack-year smoking history. He quit smokeless tobacco use about 49 years ago.  His smokeless tobacco use included snuff. He reports that he does not currently use alcohol. He reports that he does not currently use drugs. He states he lives in [place] with his daughter. He is retired mostly from manual labor.     REVIEW OF SYSTEMS:  A review of systems for today's date of service was reviewed and  "uploaded into the electronic medical record.      PHYSICAL EXAM:    ECOG PERFORMANCE STATUS:      7/22/2025     1:20 PM   ECOG Performance Review   ECOG Performance Status Restricted in physically strenuous activity but ambulatory and able to carry out work of a light or sedentary nature, e.g., light house work, office work         7/22/2025     1:20 PM   Karnofsky Score   Karnofsky Score 90     /60 (BP Location: Left arm, Patient Position: Sitting, BP Cuff Size: Adult)   Pulse 70   Temp 36.7 °C (98.1 °F)   Resp 18   Ht 1.702 m (5' 7\")   Wt 70.7 kg (155 lb 13.8 oz)   SpO2 96%   BMI 24.41 kg/m²   Physical Exam  Vitals reviewed.   Eyes:      Pupils: Pupils are equal, round, and reactive to light.   Cardiovascular:      Rate and Rhythm: Normal rate.   Pulmonary:      Effort: Pulmonary effort is normal.   Abdominal:      General: Abdomen is flat.   Musculoskeletal:         General: Normal range of motion.   Neurological:      General: No focal deficit present.      Mental Status: He is alert.   Psychiatric:         Mood and Affect: Mood normal.          LABORATORY DATA:   Lab Results   Component Value Date/Time    WBC 4.4 (L) 05/22/2025 1348    WBC 4.3 (L) 05/01/2025 1620    WBC 7.8 04/10/2025 1241    HEMOGLOBIN 8.9 (L) 05/22/2025 1348    HEMOGLOBIN 9.3 (L) 05/01/2025 1620    HEMOGLOBIN 9.0 (L) 04/10/2025 1241    HEMATOCRIT 29.7 (L) 05/22/2025 1348    HEMATOCRIT 29.9 (L) 05/01/2025 1620    HEMATOCRIT 30.1 (L) 04/10/2025 1241    MCV 79.4 (L) 05/22/2025 1348    MCV 81.3 (L) 05/01/2025 1620    MCV 89.1 04/10/2025 1241    PLATELETCT 384 05/22/2025 1348    PLATELETCT 384 05/01/2025 1620    PLATELETCT 341 04/10/2025 1241    NEUTS 2.40 05/22/2025 1348    NEUTS 2.59 05/01/2025 1620    NEUTS 5.47 04/10/2025 1241      Lab Results   Component Value Date/Time    SODIUM 135 05/22/2025 1348    SODIUM 138 05/01/2025 1620    SODIUM 136 04/10/2025 1241    POTASSIUM 5.0 05/22/2025 1348    POTASSIUM 4.5 05/01/2025 1620    " POTASSIUM 4.4 04/10/2025 1241    BUN 29 (H) 05/22/2025 1348    BUN 27 (H) 05/01/2025 1620    BUN 26 (H) 04/10/2025 1241    CREATININE 0.71 05/22/2025 1348    CREATININE 0.69 05/01/2025 1620    CREATININE 0.72 04/10/2025 1241    CALCIUM 10.0 05/22/2025 1348    CALCIUM 10.0 05/01/2025 1620    CALCIUM 9.8 04/10/2025 1241    ALBUMIN 3.6 05/22/2025 1348    ALBUMIN 3.7 05/01/2025 1620    ALBUMIN 3.4 04/10/2025 1241    ASTSGOT 26 05/22/2025 1348    ASTSGOT 22 05/01/2025 1620    ASTSGOT 18 04/10/2025 1241    ALKPHOSPHAT 96 05/22/2025 1348    ALKPHOSPHAT 102 (H) 05/01/2025 1620    ALKPHOSPHAT 87 04/10/2025 1241    IFNOTAFR >60 06/12/2021 0501    IFNOTAFR >60 05/26/2021 1403    LDHTOTAL 228 05/22/2025 1348    LDHTOTAL 157 05/01/2025 1620    LDHTOTAL 156 04/10/2025 1241       RADIOLOGY DATA:  IR-CVC TUNNEL WITH PORT REMOVAL  Result Date: 7/22/2025 7/22/2025 8:34 AM HISTORY/REASON FOR EXAM:  Chemotherapy complete. TECHNIQUE/EXAM DESCRIPTION: Removal of right internal jugular PowerPort venous access implant. PROCEDURE:  Informed consent was obtained.  The right anterior chest wall was prepped and draped in a sterile manner. Moderate sedation was provided. Pulse oximetry and continuous cardiac monitoring by the nurse was performed throughout the exam. Intraservice time was 10 minutes. Following local anesthesia with 6 mL 1% lidocaine with epinephrine, a transverse incision was made over the site of the healed incision above the port reservoir.  Following blunt and sharp dissection, the port reservoir and catheter were removed intact.  The skin overlying the port and the port pocket were unremarkable with no evidence of infection.  There was no pus or exudate about the port reservoir.  The port catheter was removed intact. Manual compression was applied over the right  internal jugular catheter entry site and port pocket for about 5 minutes. The port pocket was closed  in two layers with a deep layer of interrupted 3 -- 0 Vicryl  suture and the skin Dermabond.  A sterile dressing was applied. The patient tolerated the procedure well with no evidence of complication. COMPARISON:  None. Fluoroscopy time: 0.1 minutes. Fluoroscopic dose: 0.066 gray per centimeter squared Fluoroscopic images: 1. Contrast: None FINDINGS:  The port reservoir and catheter were removed intact.  There was no discharge or exudate at the port pocket.     1.  Removal of right internal jugular PowerPort venous implant. 2.  No evidence of infection at the port pocket.    [id number] BASE TO MID-THIGH  Result Date: 6/26/2025 6/26/2025 11:18 AM HISTORY/REASON FOR EXAM:  gastric lymphoma post treatment imaging TECHNIQUE/EXAM DESCRIPTION AND NUMBER OF VIEWS: PET body imaging. Initially, 11.27 mCi F-18 FDG was administered intravenously under standardized conditions. Approximately 45 minutes after FDG administration, the patient was placed in the supine position on the PET CT table. Blood glucose level was 90 mg/dL. Low dose spiral CT imaging was performed from the skull base to the mid thighs. PET imaging was then performed from the skull base to the mid thighs. CT images, PET images, and PET/CT fused images were reviewed on a PACS 3D workstation. The limited non-contrast CT data are used primarily for attenuation correction and anatomic correlation.  Evaluation of solid organs and bowel are especially limited utilizing this technique. COMPARISON: 3/31/2025 FINDINGS: Head and neck: No abnormal focal FDG activity. Chest: No abnormal focal FDG activity. Abdomen and pelvis: No abnormal focal FDG activity. Musculoskeletal: No abnormal focal FDG activity. Incidental findings on CT: RIGHT chest infusion port.  Coronary artery calcifications.  Bilateral kidney cysts.  Postoperative change of the pelvis.  Enlarged prostate with probable prior transurethral resection.     No evidence of residual or recurrent lymphoma.      IMPRESSION:    A 75 y.o. with  Visit Diagnoses      ICD-10-CM   1. 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 on 3/28/2025  Stage prefix: Initial diagnosis        RECOMMENDATIONS:   I discussed the role of consolidative radiation 30 Brown in 20 fractions for extra stephenie diffuse large B-cell lymphoma per recover RT study which showed improvement in progression free survival.  We discussed risk benefits patient is amenable to treatment will undergo radiation mapping today with plan to follow-up next week for treatment.  I have discussed with social work to help get housing established.    Thank you for the opportunity to participate in his care.  If any questions or comments, please do not hesitate in calling.    Orders Placed This Encounter    Rad Onc Treatment Planning CT Simulation    REFERRAL TO ONCOLOGY NURSE NAVIGATOR    sucralfate (CARAFATE) 1 g Tab                [1]   Past Medical History:  Diagnosis Date    Anesthesia     Arthritis     knees, wrists    Cancer (HCC)     Delayed emergence from general anesthesia     Dental disorder     upper partial    Disorder of thyroid     High cholesterol     Pain     Urinary incontinence    [2]   Past Surgical History:  Procedure Laterality Date    NH UPPER GI ENDOSCOPY,DIAGNOSIS N/A 3/18/2025    Procedure: GASTROSCOPY WITH BIOPSY;  Surgeon: Gricelda Coombs M.D.;  Location: SURGERY SAME DAY [facility];  Service: Gastroenterology    TURP BUTTON N/A 6/11/2021    Procedure: TURP, USING BUTTON ELECTRODE-BIPOLAR.;  Surgeon: Massimo Gee M.D.;  Location: SURGERY [facility];  Service: Urology    HERNIA REPAIR  2003    MENISCUS REPAIR Left 1978   [3]   Current Outpatient Medications   Medication Sig Dispense Refill    sucralfate (CARAFATE) 1 g Tab Take 1 Tablet by mouth 3 times a day before meals for 30 days. 90 Tablet 0    omeprazole (PRILOSEC) 40 MG delayed-release  capsule Take 1 Capsule by mouth 2 times a day for 90 days. 90 Capsule 1    finasteride (PROSCAR) 5 MG Tab Take 1 Tablet by mouth every day. 90 Tablet 3    docosahexanoic acid (OMEGA 3 FA) 1000 MG Cap       Cholecalciferol (VITAMIN D-3) 25 MCG (1000 UT) Cap       B Complex Vitamins (VITAMIN B COMPLEX PO) Take  by mouth.      Saccharomyces boulardii (PROBIOTIC) 250 MG Cap Take  by mouth.      tamsulosin (FLOMAX) 0.4 MG capsule Take 2 Capsules by mouth every day. 30 Capsule 0    atorvastatin (LIPITOR) 10 MG Tab       Glucosamine HCl (GLUCOSAMINE PO) Take 1 Dose by mouth every day.      acetaminophen (TYLENOL) 500 MG Tab Take 500-1,000 mg by mouth every 6 hours as needed.      allopurinol (ZYLOPRIM) 300 MG Tab Take 1 Tablet by mouth every day. 30 Tablet 3    predniSONE (DELTASONE) 20 MG Tab Take 3.5 Tablets by mouth every day. on days 1-5 of each treatment cycle. 30 Tablet 0    ondansetron (ZOFRAN) 4 MG Tab tablet Take 1 Tablet by mouth every four hours as needed for Nausea/Vomiting. 20 Tablet 3    prochlorperazine (COMPAZINE) 10 MG Tab Take 1 Tablet by mouth every 6 hours as needed for Nausea/Vomiting. 30 Tablet 3    lidocaine-prilocaine (EMLA) 2.5-2.5 % Cream Apply to port 1 hour prior to access of port and cover with plastic wrap. 30 g 3    OLANZapine (ZYPREXA) 5 MG Tab Take 1 tablet by mouth nightly on days 1-4 of each chemotherapy cycle. 4 Tablet 3     No current facility-administered medications for this encounter.

## 2025-07-22 NOTE — RADIATION THERAPY SIMULATION DIRECTIVE
DATE OF SERVICE: 7/22/2025    DIAGNOSIS:  Gastric lymphoma (HCC)  Staging form: Hodgkin and Non-Hodgkin Lymphoma, AJCC 8th Edition  - Clinical: Stage IE (Diffuse large B-cell lymphoma) - Signed by Karsten Raza D.O. on 3/28/2025  Stage prefix: Initial diagnosis       DATE OF SERVICE: 7/22/2025    TYPE OF SIMULATION: 4d stomach    GOAL OF TREATMENT:   [x] Curative  [] Palliative  [] Oligometastatic    CONTRAST:    [] IV Contrast*  [] Oral Contrast               POSITION:    [x]  Supine  [] Prone     IMMOBILIZATION DEVICE: []  Body-Fix  [] Omniboard  []  Bellyboard    PROCEDURE: Patient placed in vaklok immobilization device. 4D gated and non-gated CT obtained to assess organ motion.  Images viewed and approved.  Images reconstructed as need.  Image data sets transferred to [facility] for planning.    I have personally reviewed the relevant data, performed the target localization, and determined all relevant factors for this patient’s simulation.    *Omnipaque 80 -100cc IVP in conjunction with 500cc NS
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
## 2025-07-22 NOTE — RADIATION THERAPY SIMULATION DIRECTIVE
PATIENT NAME Arya Andrew [facility]   PRIMARY PHYSICIAN [place] [id number]   REFERRING PHYSICIAN Karsten Raza D.O. 1950     Gastric lymphoma (HCC)  Staging form: Hodgkin and Non-Hodgkin Lymphoma, AJCC 8th Edition  - Clinical: Stage IE (Diffuse large B-cell lymphoma) - Signed by Karsten Raza D.O. on 3/28/2025  Stage prefix: Initial diagnosis         Treatment Planning CT Simulation        Order Questions       Question Answer Comment    Is this for a new course of treatment? Yes     Is this an Addendum? No     Implanted Device/Pacemaker No     Simulation Status Initial     Planned Start Date 7/29/2025     Treatment Site Stomach     Laterality Axial     Treatment Technique IMRT     Treatment Pattern/Frequency Daily 20 tx    Concurrent Chemotherapy No     CT Technique 3D      4D     Slice Thickness 2mm     Scan Extent Abdomen     Treatment Device(s) Other      Vac Naren     Patient Attire Gown     Patient Position Supine     Patient Orientation Head First     Arm Position Up     Treatment Machine TB1 - STx     Treatment Image Guidance CBCT     Frequency (CBCT) Daily     Image Guidance Match PTV - Soft Tissue      Bone     Treatment Planning Image Fusion CT/PET     Other Orders Special Tx Procedure      Weekly Physics Check

## 2025-07-22 NOTE — RADIATION THERAPY SIMULATION DIRECTIVE
PATIENT NAME Arya Andrew [facility]   PRIMARY PHYSICIAN [place] [id number]   REFERRING PHYSICIAN No ref. provider found 1950       DATE OF SERVICE: 7/22/2025    DIAGNOSIS:  Gastric lymphoma (HCC)  Staging form: Hodgkin and Non-Hodgkin Lymphoma, AJCC 8th Edition  - Clinical: Stage IE (Diffuse large B-cell lymphoma) - Signed by Karsten Raza D.O. on 3/28/2025  Stage prefix: Initial diagnosis       SPECIAL TREATMENT PROCEDURE NOTE:  Considerable additional effort required in the management of this case because of administration of recent sequential chemotherapy which may result in increased normal tissue toxicity. This includes longer and possibly more frequent on treatment visits.

## 2025-07-22 NOTE — Clinical Note
REFERRAL APPROVAL NOTICE         Sent on July 22, 2025                   Andrew Gamez  [address] [address]                   Dear Mr. Gamez,    After a careful review of the medical information and benefit coverage, [facility] has processed your referral. See below for additional details.    If applicable, you must be actively enrolled with your insurance for coverage of the authorized service. If you have any questions regarding your coverage, please contact your insurance directly.    REFERRAL INFORMATION   Referral #:  [id number]  Referred-To Department    Referred-By Provider:  Radiation Oncology    Oncology    Radiation [facility]      No address on file  Referring provider phone N/A [address]  [place] [address]  [phone]    Referral Start Date:  07/22/2025  Referral End Date:   *** No expiration date on the referral.             SCHEDULING  If you do not already have an appointment, please call [phone] to make an appointment.     MORE INFORMATION  If you do not already have a [facility] account, sign up at: [email or web address]  You can access your medical information, make appointments, see lab results, billing information, and more.  If you have questions regarding this referral, please contact  the [facility] department at:             [phone]. Monday - Friday 8:00AM - 5:00PM.     Sincerely,    [facility]

## 2025-07-22 NOTE — RADIATION THERAPY SIMULATION DIRECTIVE
Clinical Treatment Planning Note    DATE OF SERVICE: 7/22/2025    DIAGNOSIS:  Gastric lymphoma (HCC)  Staging form: Hodgkin and Non-Hodgkin Lymphoma, AJCC 8th Edition  - Clinical: Stage IE (Diffuse large B-cell lymphoma) - Signed by Karsten Raza D.O. on 3/28/2025  Stage prefix: Initial diagnosis         IMAGING REVIEWED:  [x] CT     [] MRI     [] PET/CT     [] BONE SCAN     [] MAMMO     [] OTHER      TREATMENT INTENT:   [x] CURATIVE     [] MAINTENANCE     []  PALLIATIVE      []  SUPPORTIVE     []  PROPHYLACTIC     [] BENIGN     []  CONSOLIDATIVE      [] DEFINITIVE   []  OLOGIMETASTATIC      LINE OF TREATMENT:  [] ADJUVANT   [x] DEFINITIVE   [] NEOADJUVANT   [] RE-TREATMENT      TECHNIQUE PLANNED:  [x] IMRT   [] 3D   [] SBRT   [] SRS/SRT   [] HDR   [] ELECTRON       IMRT JUSTIFICATION:  []   An immediately adjacent area has been previously irradiated and abutting portals must be established with high precision.    []  Dose escalation is planned to deliver radiation doses in excess of those commonly utilized for similar tumors with conventional treatment.    [x]  The target volume is concave or convex, and the critical normal tissues are within or around that convexity or concavity.    []  The target volume is in close proximity to critical structures that must be protected.    []  The volume of interest must be covered with narrow margins to adequately protect  immediately adjacent structures.      FIELDS & BLOCKING:  [x] COMPLEX BLOCKS     []  = 3 TX AREAS     [x]  ARCS     []  CUSTOM SHEILD        []  SIMPLE BLOCK      CHEMOTHERAPY:  []  CONCURRENT     []  INDUCTION     [x] SEQUENTIAL     []  <30 DAYS FROM XRT      NOTES:  OAR CONSTRAINTS: (GUIDELINES ONLY NOT ABSOLUTE)  Target Prescribed Coverage   % of PTV covered by 95% (cGy) of RX Dose       MAEGAN Goal   Plan Hot Spot Max Dose < 120%   Cord Max Dose < 50Gy   Cord + 0.5cm Max Dose < 50Gy   Total Lung  V20Gy < 31%   Total Lung V10Gy < 50%   Total Lung V5Gy <  60%   Total Lung  Mean Dose < 20Gy   Heart V50Gy < 1/3 volume   Heart V45Gy < 2/3 volume   Heart V40Gy < 3/3 volume   Liver Mean Dose < 30Gy   Bi. Kidney Mean Dose < 18Gy   R Kidney V50Gy < 1/3 volume   R Kidney V30Gy < 2/3 volume   R Kidney V23Gy < 3/3 volume   L Kidney V50Gy < 1/3 volume   L Kidney V30Gy < 2/3 volume   L Kidney V23Gy < 3/3 volume   Stomach Max Dose < 54Gy   Stomach V45Gy < 15%   Individual Small Bowel Loops V15Gy < 120cc   Entire Cavity Small Bowel V45Gy < 195cc   Small Bowel  Max Dose < 50.4Gy   *RTOG 0436, 0617, 0348, QUANTEC

## 2025-07-22 NOTE — PROGRESS NOTES
D/c instructions and extra bandages given to pt, verbalizes understanding. Ambulated out to home.

## 2025-07-22 NOTE — PROGRESS NOTES
Pt presents to [facility]. Pt was consented by MD at bedside, confirmed by this RN and consent at bedside. Pt transferred to procedure table in supine position. Patient underwent a port removal by Dr. Paul. Procedure site was marked by MD and verified using imaging guidance. Procedure was non-sedation. Pt placed on monitor, prepped and draped in a sterile fashion. Vitals were taken every 5 minutes and remained stable during procedure (see doc flow sheet for results).

## 2025-07-22 NOTE — PROGRESS NOTES
"Patient was seen today in clinic with Dr. Ordonez for consult.  Vitals signs and weight were obtained and pain assessment was completed.  Allergies and medications were reviewed with the patient.       Vitals/Pain:  Vitals:    07/22/25 1313   BP: 120/60   BP Location: Left arm   Patient Position: Sitting   BP Cuff Size: Adult   Pulse: 70   Resp: 18   Temp: 36.7 °C (98.1 °F)   SpO2: 96%   Weight: 70.7 kg (155 lb 13.8 oz)   Height: 1.702 m (5' 7\")   Pain Score: 2=Minimal-Slight        Allergies:   Penicillins    Current Medications:  Current Medications[1]        Valencia Brown R.N.       [1]   Current Outpatient Medications   Medication Sig Dispense Refill    omeprazole (PRILOSEC) 40 MG delayed-release capsule Take 1 Capsule by mouth 2 times a day for 90 days. 90 Capsule 1    finasteride (PROSCAR) 5 MG Tab Take 1 Tablet by mouth every day. 90 Tablet 3    docosahexanoic acid (OMEGA 3 FA) 1000 MG Cap       Cholecalciferol (VITAMIN D-3) 25 MCG (1000 UT) Cap       B Complex Vitamins (VITAMIN B COMPLEX PO) Take  by mouth.      Saccharomyces boulardii (PROBIOTIC) 250 MG Cap Take  by mouth.      tamsulosin (FLOMAX) 0.4 MG capsule Take 2 Capsules by mouth every day. 30 Capsule 0    atorvastatin (LIPITOR) 10 MG Tab       Glucosamine HCl (GLUCOSAMINE PO) Take 1 Dose by mouth every day.      acetaminophen (TYLENOL) 500 MG Tab Take 500-1,000 mg by mouth every 6 hours as needed.      allopurinol (ZYLOPRIM) 300 MG Tab Take 1 Tablet by mouth every day. 30 Tablet 3    predniSONE (DELTASONE) 20 MG Tab Take 3.5 Tablets by mouth every day. on days 1-5 of each treatment cycle. 30 Tablet 0    ondansetron (ZOFRAN) 4 MG Tab tablet Take 1 Tablet by mouth every four hours as needed for Nausea/Vomiting. 20 Tablet 3    prochlorperazine (COMPAZINE) 10 MG Tab Take 1 Tablet by mouth every 6 hours as needed for Nausea/Vomiting. 30 Tablet 3    lidocaine-prilocaine (EMLA) 2.5-2.5 % Cream Apply to port 1 hour prior to access of port and cover with " plastic wrap. 30 g 3    OLANZapine (ZYPREXA) 5 MG Tab Take 1 tablet by mouth nightly on days 1-4 of each chemotherapy cycle. 4 Tablet 3     No current facility-administered medications for this encounter.

## 2025-07-23 ENCOUNTER — PATIENT OUTREACH (OUTPATIENT)
Dept: ONCOLOGY | Facility: MEDICAL CENTER | Age: 75
End: 2025-07-23
Payer: MEDICARE

## 2025-07-23 NOTE — PROGRESS NOTES
On July 22, 2025, , Flavia Lozano, met with pt. in person in the radiation department. Pt. is coming from [place], [place] and will be doing 4 weeks of daily radiation starting on 07/29/2025 and would like to stay at the [facility]. MIRA Lozano had pt. fill out an application for [employer] lodging and explained the gus and the stipulations. Pt. is aware that the gus does not allow for Friday night to Sunday night stays. Pt. has a plan to drive himself to [place] for radiation but stated that if he does not feel well, his son or grandson also are able to drive him. Pt.'s family all lives in [place].     Pt. currently lives with his daughter in [place]. Pt. does not have any concerns with his basic needs at this time and is not in need of any additional resources. MIRA Lozano informed pt. of the gas resource and will provide pt. with an application once it is back up and running.     MIRA Lozano informed pt. that the team will work on getting his dates booked at the [facility] and give pt. a call with confirmation and further instructions.

## 2025-07-24 ENCOUNTER — PATIENT OUTREACH (OUTPATIENT)
Dept: ONCOLOGY | Facility: MEDICAL CENTER | Age: 75
End: 2025-07-24
Payer: MEDICARE

## 2025-07-24 NOTE — PROGRESS NOTES
On July 24, 2025, , Flavia Lozano, attempted to call pt.'s daughter Cecy to inform her of the room confirmations for pt. She did not answer and MIRA Lozano left a detailed voicemail and also sent an email to the email she provided yesterday with instructions on the Inn. MIRA Lozano also provided [facility]'s email address for them to use while MIRA Lozano is [place] Friday and Monday.

## 2025-07-24 NOTE — PROGRESS NOTES
On July 23, 2025, , Flavia Lozano, received phone call from pt.s daughter, Cecy Moscoso (listed in Epic as contact). Cecy wanted to confirm the details regarding the lodging as pt. was trying to inform his family of the lodging stay at the [facility] and pt. appeared to be confused. MIRA Lozano confirmed with Cecy that pt. filled out an application to stay at the [facility] with the [facility] gus. MIRA Lozano informed Cecy of the logistics and parameter of the gus and stated that the application was provided to LULÚ Robertson to book the dates and confirm availability. Cecy asked to be called after the lodging dates are booked so she can confirm and make sure she has the proper information to go over with pt.    Cecy stated that pt. does get confused and will not know where to go for the Inn. MIRA Lozano stated that she will meet with pt. after his radiation appointment on Tuesday when he gets to [place] and will help him check in with the Inn and show him where the cafeteria is. Cecy agreed that would be helpful. MIRA Lozano will call Cecy back once lodging is booked.

## 2025-07-25 PROCEDURE — 77300 RADIATION THERAPY DOSE PLAN: CPT | Mod: 26 | Performed by: RADIOLOGY

## 2025-07-25 PROCEDURE — 77293 RESPIRATOR MOTION MGMT SIMUL: CPT | Performed by: RADIOLOGY

## 2025-07-25 PROCEDURE — 77338 DESIGN MLC DEVICE FOR IMRT: CPT | Mod: 26 | Performed by: RADIOLOGY

## 2025-07-25 PROCEDURE — 77301 RADIOTHERAPY DOSE PLAN IMRT: CPT | Performed by: RADIOLOGY

## 2025-07-25 PROCEDURE — 77338 DESIGN MLC DEVICE FOR IMRT: CPT | Performed by: RADIOLOGY

## 2025-07-25 PROCEDURE — 77300 RADIATION THERAPY DOSE PLAN: CPT | Performed by: RADIOLOGY

## 2025-07-25 PROCEDURE — 77301 RADIOTHERAPY DOSE PLAN IMRT: CPT | Mod: 26 | Performed by: RADIOLOGY

## 2025-07-25 PROCEDURE — 77293 RESPIRATOR MOTION MGMT SIMUL: CPT | Mod: 26 | Performed by: RADIOLOGY

## 2025-07-28 ENCOUNTER — TELEPHONE (OUTPATIENT)
Dept: RADIATION ONCOLOGY | Facility: MEDICAL CENTER | Age: 75
End: 2025-07-28
Payer: MEDICARE

## 2025-07-28 NOTE — TELEPHONE ENCOUNTER
Nutrition Services: Brief Update      Arya Gamez is a 75 y.o. 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     Pertinent Recent Lab work (5/22/25): BUN 29, Glucose 110    RD able to call pt for re-introduction and check in prior to start of radiation tomorrow. Noted pt previously spoke with SUZETTE Daugherty while undergoing riTUXimab + CHOP. Pt answers, states is managing well. Denies any significant nutrition concerns or questions, states started prilosec and now a new medication, Carafate. Asks if these two medications can be taken together. Denies additional concerns at this time.     Plan/Recommend:  RD reviewed prescription instructions per MAR, though encouraged pt to check specific MD instructions for each medication.   RD encouraged pt to follow-up if any nutrition concerns or challenges occur with new treatment modality.     Pt verbalizes understanding and is receptive to information provided. Pt does report some recent challenges with memory, RD to follow-up again.  RD available PRN and will make further recommendations as indicated within policy.    [phone]

## 2025-07-29 ENCOUNTER — HOSPITAL ENCOUNTER (OUTPATIENT)
Dept: RADIATION ONCOLOGY | Facility: MEDICAL CENTER | Age: 75
End: 2025-07-29

## 2025-07-29 ENCOUNTER — PATIENT OUTREACH (OUTPATIENT)
Dept: ONCOLOGY | Facility: MEDICAL CENTER | Age: 75
End: 2025-07-29
Payer: MEDICARE

## 2025-07-29 LAB
CHEMOTHERAPY INFUSION START DATE: NORMAL
CHEMOTHERAPY RECORDS: 1.5 GY
CHEMOTHERAPY RECORDS: 3000 CGY
CHEMOTHERAPY RECORDS: NORMAL
CHEMOTHERAPY RX CANCER: NORMAL
DATE 1ST CHEMO CANCER: NORMAL
RAD ONC ARIA COURSE LAST TREATMENT DATE: NORMAL
RAD ONC ARIA COURSE TREATMENT ELAPSED DAYS: NORMAL
RAD ONC ARIA REFERENCE POINT DOSAGE GIVEN TO DATE: 1.5 GY
RAD ONC ARIA REFERENCE POINT ID: NORMAL
RAD ONC ARIA REFERENCE POINT SESSION DOSAGE GIVEN: 1.5 GY

## 2025-07-29 PROCEDURE — 77280 THER RAD SIMULAJ FIELD SMPL: CPT | Performed by: RADIOLOGY

## 2025-07-29 PROCEDURE — 77386 HCHG IMRT DELIVERY COMPLEX: CPT | Performed by: RADIOLOGY

## 2025-07-29 NOTE — PROCEDURES
DATE OF SERVICE: 7/29/2025    Radiation Therapy Episodes       Active Episodes       Radiation Therapy (IMRT) (7/29/2025)                   Radiation Treatments         Plan Last Treated On Elapsed Days Fractions Treated Prescribed Fraction Dose (cGy) Prescribed Total Dose (cGy)    Stomach 7/29/2025 0 @ 07/29/2025 1 of 20 150 3,000                  Reference Point Last Treated On Elapsed Days Most Recent Session Dose (cGy) Total Dose (cGy)    Stomach 7/29/2025 0 @ 07/29/2025 150 150                            First Visit Simple Simulation: Called by Truebeam machine to verify treatment parameters including:  treatment site, treatment dose, and treatment setup prior to first treatment. Image derived shifts reviewed in all appropriate planes.  Shifts approved.  Patient treated.    I have personally reviewed the relevant data, performed the target localization, and determined all relevant factors for this patient’s simulation.
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
## 2025-07-29 NOTE — PROGRESS NOTES
Call placed to Andrew to help him with his medications - he has some newer medications and they have special instructions. Andrew answers and states he has two bags of medication bottles. ROYAL went over each bottle with Andrew and explained the reason for the medications, how to take them and when to take them. Andrew realizes that he has daily pill box organized with all his meds by time, but had not added the new medications (Carafate and Prilosec). He thought he brought the organizer with him but he left it in [place] this week. Andrew organized his medication bottles in his room and understands how to take his medications. ROYAL also typed out a daily schedule for Andrew and offered to bring it to the [facility]. He declined for tonight and appreciated the phone call. ROYAL will review with Andrew tomorrow in Radiation Oncology.    Xenograft Text: The defect edges were debeveled with a #15 scalpel blade.  Given the location of the defect, shape of the defect and the proximity to free margins a xenograft was deemed most appropriate.  The graft was then trimmed to fit the size of the defect.  The graft was then placed in the primary defect and oriented appropriately.

## 2025-07-30 ENCOUNTER — HOSPITAL ENCOUNTER (OUTPATIENT)
Dept: RADIATION ONCOLOGY | Facility: MEDICAL CENTER | Age: 75
End: 2025-07-30

## 2025-07-30 ENCOUNTER — HOSPITAL ENCOUNTER (OUTPATIENT)
Dept: RADIATION ONCOLOGY | Facility: MEDICAL CENTER | Age: 75
End: 2025-07-30
Attending: RADIOLOGY
Payer: MEDICARE

## 2025-07-30 VITALS
OXYGEN SATURATION: 94 % | DIASTOLIC BLOOD PRESSURE: 77 MMHG | SYSTOLIC BLOOD PRESSURE: 128 MMHG | WEIGHT: 152.78 LBS | RESPIRATION RATE: 18 BRPM | TEMPERATURE: 96.7 F | HEART RATE: 95 BPM | BODY MASS INDEX: 23.93 KG/M2

## 2025-07-30 LAB
CHEMOTHERAPY INFUSION START DATE: NORMAL
CHEMOTHERAPY RECORDS: 1.5 GY
CHEMOTHERAPY RECORDS: 3000 CGY
CHEMOTHERAPY RECORDS: NORMAL
CHEMOTHERAPY RX CANCER: NORMAL
DATE 1ST CHEMO CANCER: NORMAL
RAD ONC ARIA COURSE LAST TREATMENT DATE: NORMAL
RAD ONC ARIA COURSE TREATMENT ELAPSED DAYS: NORMAL
RAD ONC ARIA REFERENCE POINT DOSAGE GIVEN TO DATE: 3 GY
RAD ONC ARIA REFERENCE POINT ID: NORMAL
RAD ONC ARIA REFERENCE POINT SESSION DOSAGE GIVEN: 1.5 GY

## 2025-07-30 PROCEDURE — 77386 HCHG IMRT DELIVERY COMPLEX: CPT | Performed by: RADIOLOGY

## 2025-07-30 ASSESSMENT — FIBROSIS 4 INDEX: FIB4 SCORE: 1.26

## 2025-07-30 ASSESSMENT — PAIN SCALES - GENERAL: PAINLEVEL_OUTOF10: NO PAIN

## 2025-07-30 NOTE — RADIATION TREATMENT MANAGEMENT
"ON TREATMENT  NOTE  RADIATION ONCOLOGY DEPARTMENT    Patient name:  Arya Gamez    Primary Physician:  SEFERINO Johnson MRN: [id number]  CSN: [id number]   Referring physician:  Karsten Raza D.O.   : 1950, 75 y.o.     ENCOUNTER DATE:  2025      DIAGNOSIS:  Gastric lymphoma (HCC)  Staging form: Hodgkin and Non-Hodgkin Lymphoma, AJCC 8th Edition  - Clinical: Stage IE (Diffuse large B-cell lymphoma) - Signed by Karsten Raza D.O. on 3/28/2025  Stage prefix: Initial diagnosis      TREATMENT SUMMARY:  Course First Treatment Date 2025  Course Last Treatment Date 2025  Radiation Treatments       Active   Plans   Stomach   Most recent treatment: Dose planned: 150 cGy (fraction 1 of 20 on 2025)   Total: Dose planned: 3,000 cGy   Elapsed Days: 0 @ 2025           Historical   No historical radiation treatments to show.                 SUBJECTIVE:  Well appearing    VITAL SIGNS:      2025    10:50 AM 2025     1:13 PM 2025     9:05 AM 2025     9:00 AM 2025     8:55 AM 2025     8:50 AM 2025     8:45 AM   Vitals   SYSTOLIC 128 120 113 117 124 127 126   DIASTOLIC 77 60 63 61 68 70 69   Pulse 95 70 82 84 86 88 90   Temperature 35.9 °C (96.7 °F) 36.7 °C (98.1 °F)        Respiration 18 18        Weight 152.78 155.87        Height  1.702 m (5' 7\")        BMI 23.93 kg/m2 24.41 kg/m2        Pulse Oximetry 94 % 96 % 92 % 91 % 92 % 92 % 92 %     KPS: 100, Fully active, able to carry on all pre-disease performed without restriction (ECOG equivalent 0)  Encounter Vitals  Temperature: 35.9 °C (96.7 °F)  Blood Pressure : 128/77  Pulse: 95  Respiration: 18  Pulse Oximetry: 94 %  Weight: 69.3 kg (152 lb 12.5 oz)  Weight Source: Stand Up Scale  Pain Score: No pain      2025    10:50 AM 2025     1:13 PM 2025    10:24 AM 4/3/2025    11:38 AM 3/28/2025     8:07 AM   Pain Assessment   Pain Score NO PAIN 2=MINIMAL PA  6=MODERATE P 6=MODERATE P   Pain " Loc  KNEE KNEE            PHYSICAL EXAM:  Physical Exam         7/30/2025    10:53 AM   Toxicity Assessment   Toxicity Assessment Abdomen   Fatigue (lethargy, malaise, asthenia) None   Radiation Dermatitis None   Anorexia None   Dehydration None   Diarrhea w/o Colostomy None   Nausea None   Vomiting None   Dyspepsia/heartburn None   Dysphagia, Esophagitis, odynophagoa (painful swallowing) None   Gastritis None   Abdominal Pain or cramping None       CURRENT MEDICATIONS:  Current Medications[1]    LABORATORY DATA:   Lab Results   Component Value Date/Time    SODIUM 135 05/22/2025 01:48 PM    POTASSIUM 5.0 05/22/2025 01:48 PM    CHLORIDE 103 05/22/2025 01:48 PM    CO2 22 05/22/2025 01:48 PM    GLUCOSE 110 (H) 05/22/2025 01:48 PM    BUN 29 (H) 05/22/2025 01:48 PM    CREATININE 0.71 05/22/2025 01:48 PM       Lab Results   Component Value Date/Time    WBC 4.4 (L) 05/22/2025 01:48 PM    RBC 3.74 (L) 05/22/2025 01:48 PM    HEMOGLOBIN 8.9 (L) 05/22/2025 01:48 PM    HEMATOCRIT 29.7 (L) 05/22/2025 01:48 PM    MCV 79.4 (L) 05/22/2025 01:48 PM    MCH 23.8 (L) 05/22/2025 01:48 PM    MCHC 30.0 (L) 05/22/2025 01:48 PM    PLATELETCT 384 05/22/2025 01:48 PM         RADIOLOGY DATA:  IR-CVC TUNNEL WITH PORT REMOVAL  Result Date: 7/22/2025  1.  Removal of right internal jugular PowerPort venous implant. 2.  No evidence of infection at the port pocket.    TD-MYYIG-EYDGH BASE TO MID-THIGH  Result Date: 6/26/2025  No evidence of residual or recurrent lymphoma.      IMPRESSION:  Cancer Staging   Gastric lymphoma (HCC)  Staging form: Hodgkin and Non-Hodgkin Lymphoma, AJCC 8th Edition  - Clinical: Stage IE (Diffuse large B-cell lymphoma) - Signed by Karsten Raza D.O. on 3/28/2025      PLAN:  No change in treatment plan    Disposition:  Treatment plan and imaging reviewed. Questions answered. Continue therapy outlined.     Len Ordonez M.D.    No orders of the defined types were placed in this encounter.                       [1]    Current Outpatient Medications:     sucralfate (CARAFATE) 1 g Tab, Take 1 Tablet by mouth 3 times a day before meals for 30 days., Disp: 90 Tablet, Rfl: 0    omeprazole (PRILOSEC) 40 MG delayed-release capsule, Take 1 Capsule by mouth 2 times a day for 90 days., Disp: 90 Capsule, Rfl: 1    finasteride (PROSCAR) 5 MG Tab, Take 1 Tablet by mouth every day., Disp: 90 Tablet, Rfl: 3    allopurinol (ZYLOPRIM) 300 MG Tab, Take 1 Tablet by mouth every day., Disp: 30 Tablet, Rfl: 3    predniSONE (DELTASONE) 20 MG Tab, Take 3.5 Tablets by mouth every day. on days 1-5 of each treatment cycle., Disp: 30 Tablet, Rfl: 0    docosahexanoic acid (OMEGA 3 FA) 1000 MG Cap, , Disp: , Rfl:     Cholecalciferol (VITAMIN D-3) 25 MCG (1000 UT) Cap, , Disp: , Rfl:     ondansetron (ZOFRAN) 4 MG Tab tablet, Take 1 Tablet by mouth every four hours as needed for Nausea/Vomiting., Disp: 20 Tablet, Rfl: 3    prochlorperazine (COMPAZINE) 10 MG Tab, Take 1 Tablet by mouth every 6 hours as needed for Nausea/Vomiting., Disp: 30 Tablet, Rfl: 3    lidocaine-prilocaine (EMLA) 2.5-2.5 % Cream, Apply to port 1 hour prior to access of port and cover with plastic wrap., Disp: 30 g, Rfl: 3    OLANZapine (ZYPREXA) 5 MG Tab, Take 1 tablet by mouth nightly on days 1-4 of each chemotherapy cycle., Disp: 4 Tablet, Rfl: 3    B Complex Vitamins (VITAMIN B COMPLEX PO), Take  by mouth., Disp: , Rfl:     Saccharomyces boulardii (PROBIOTIC) 250 MG Cap, Take  by mouth., Disp: , Rfl:     tamsulosin (FLOMAX) 0.4 MG capsule, Take 2 Capsules by mouth every day., Disp: 30 Capsule, Rfl: 0    atorvastatin (LIPITOR) 10 MG Tab, , Disp: , Rfl:     Glucosamine HCl (GLUCOSAMINE PO), Take 1 Dose by mouth every day., Disp: , Rfl:     acetaminophen (TYLENOL) 500 MG Tab, Take 500-1,000 mg by mouth every 6 hours as needed., Disp: , Rfl:

## 2025-07-31 ENCOUNTER — HOSPITAL ENCOUNTER (OUTPATIENT)
Dept: RADIATION ONCOLOGY | Facility: MEDICAL CENTER | Age: 75
End: 2025-07-31
Payer: MEDICARE

## 2025-07-31 LAB
CHEMOTHERAPY INFUSION START DATE: NORMAL
CHEMOTHERAPY RECORDS: 1.5 GY
CHEMOTHERAPY RECORDS: 3000 CGY
CHEMOTHERAPY RECORDS: NORMAL
CHEMOTHERAPY RX CANCER: NORMAL
DATE 1ST CHEMO CANCER: NORMAL
RAD ONC ARIA COURSE LAST TREATMENT DATE: NORMAL
RAD ONC ARIA COURSE TREATMENT ELAPSED DAYS: NORMAL
RAD ONC ARIA REFERENCE POINT DOSAGE GIVEN TO DATE: 4.5 GY
RAD ONC ARIA REFERENCE POINT ID: NORMAL
RAD ONC ARIA REFERENCE POINT SESSION DOSAGE GIVEN: 1.5 GY

## 2025-07-31 PROCEDURE — 77386 HCHG IMRT DELIVERY COMPLEX: CPT | Performed by: RADIOLOGY

## 2025-08-01 ENCOUNTER — PATIENT OUTREACH (OUTPATIENT)
Dept: ONCOLOGY | Facility: MEDICAL CENTER | Age: 75
End: 2025-08-01
Payer: MEDICARE

## 2025-08-01 ENCOUNTER — HOSPITAL ENCOUNTER (OUTPATIENT)
Dept: RADIATION ONCOLOGY | Facility: MEDICAL CENTER | Age: 75
End: 2025-08-01

## 2025-08-01 ENCOUNTER — HOSPITAL ENCOUNTER (OUTPATIENT)
Dept: RADIATION ONCOLOGY | Facility: MEDICAL CENTER | Age: 75
End: 2025-08-01
Attending: RADIOLOGY
Payer: MEDICARE

## 2025-08-01 LAB
CHEMOTHERAPY INFUSION START DATE: NORMAL
CHEMOTHERAPY RECORDS: 1.5 GY
CHEMOTHERAPY RECORDS: 3000 CGY
CHEMOTHERAPY RECORDS: NORMAL
CHEMOTHERAPY RX CANCER: NORMAL
DATE 1ST CHEMO CANCER: NORMAL
RAD ONC ARIA COURSE LAST TREATMENT DATE: NORMAL
RAD ONC ARIA COURSE TREATMENT ELAPSED DAYS: NORMAL
RAD ONC ARIA REFERENCE POINT DOSAGE GIVEN TO DATE: 6 GY
RAD ONC ARIA REFERENCE POINT ID: NORMAL
RAD ONC ARIA REFERENCE POINT SESSION DOSAGE GIVEN: 1.5 GY

## 2025-08-01 PROCEDURE — 77386 HCHG IMRT DELIVERY COMPLEX: CPT | Performed by: RADIOLOGY

## 2025-08-01 PROCEDURE — 77014 PR CT GUIDANCE PLACEMENT RAD THERAPY FIELDS: CPT | Mod: 26 | Performed by: RADIOLOGY

## 2025-08-04 ENCOUNTER — HOSPITAL ENCOUNTER (OUTPATIENT)
Dept: RADIATION ONCOLOGY | Facility: MEDICAL CENTER | Age: 75
End: 2025-08-04
Payer: MEDICARE

## 2025-08-04 ENCOUNTER — PATIENT OUTREACH (OUTPATIENT)
Dept: ONCOLOGY | Facility: MEDICAL CENTER | Age: 75
End: 2025-08-04
Payer: MEDICARE

## 2025-08-04 LAB
CHEMOTHERAPY INFUSION START DATE: NORMAL
CHEMOTHERAPY RECORDS: 1.5 GY
CHEMOTHERAPY RECORDS: 3000 CGY
CHEMOTHERAPY RECORDS: NORMAL
CHEMOTHERAPY RX CANCER: NORMAL
DATE 1ST CHEMO CANCER: NORMAL
RAD ONC ARIA COURSE LAST TREATMENT DATE: NORMAL
RAD ONC ARIA COURSE TREATMENT ELAPSED DAYS: NORMAL
RAD ONC ARIA REFERENCE POINT DOSAGE GIVEN TO DATE: 7.5 GY
RAD ONC ARIA REFERENCE POINT ID: NORMAL
RAD ONC ARIA REFERENCE POINT SESSION DOSAGE GIVEN: 1.5 GY

## 2025-08-04 PROCEDURE — 77427 RADIATION TX MANAGEMENT X5: CPT | Performed by: RADIOLOGY

## 2025-08-04 PROCEDURE — 77386 HCHG IMRT DELIVERY COMPLEX: CPT | Performed by: RADIOLOGY

## 2025-08-04 PROCEDURE — 77014 PR CT GUIDANCE PLACEMENT RAD THERAPY FIELDS: CPT | Mod: 26 | Performed by: RADIOLOGY

## 2025-08-05 ENCOUNTER — HOSPITAL ENCOUNTER (OUTPATIENT)
Dept: RADIATION ONCOLOGY | Facility: MEDICAL CENTER | Age: 75
End: 2025-08-05
Payer: MEDICARE

## 2025-08-05 ENCOUNTER — PATIENT OUTREACH (OUTPATIENT)
Dept: ONCOLOGY | Facility: MEDICAL CENTER | Age: 75
End: 2025-08-05
Payer: MEDICARE

## 2025-08-05 LAB
CHEMOTHERAPY INFUSION START DATE: NORMAL
CHEMOTHERAPY RECORDS: 1.5 GY
CHEMOTHERAPY RECORDS: 3000 CGY
CHEMOTHERAPY RECORDS: NORMAL
CHEMOTHERAPY RX CANCER: NORMAL
DATE 1ST CHEMO CANCER: NORMAL
RAD ONC ARIA COURSE LAST TREATMENT DATE: NORMAL
RAD ONC ARIA COURSE TREATMENT ELAPSED DAYS: NORMAL
RAD ONC ARIA REFERENCE POINT DOSAGE GIVEN TO DATE: 9 GY
RAD ONC ARIA REFERENCE POINT ID: NORMAL
RAD ONC ARIA REFERENCE POINT SESSION DOSAGE GIVEN: 1.5 GY

## 2025-08-05 PROCEDURE — 77386 HCHG IMRT DELIVERY COMPLEX: CPT | Performed by: RADIOLOGY

## 2025-08-05 PROCEDURE — 77014 PR CT GUIDANCE PLACEMENT RAD THERAPY FIELDS: CPT | Mod: 26 | Performed by: RADIOLOGY

## 2025-08-06 ENCOUNTER — HOSPITAL ENCOUNTER (OUTPATIENT)
Dept: RADIATION ONCOLOGY | Facility: MEDICAL CENTER | Age: 75
End: 2025-08-06
Attending: RADIOLOGY
Payer: MEDICARE

## 2025-08-06 ENCOUNTER — HOSPITAL ENCOUNTER (OUTPATIENT)
Dept: RADIATION ONCOLOGY | Facility: MEDICAL CENTER | Age: 75
End: 2025-08-06
Payer: MEDICARE

## 2025-08-06 VITALS
TEMPERATURE: 98.8 F | DIASTOLIC BLOOD PRESSURE: 77 MMHG | HEART RATE: 88 BPM | BODY MASS INDEX: 23.76 KG/M2 | SYSTOLIC BLOOD PRESSURE: 119 MMHG | OXYGEN SATURATION: 98 % | WEIGHT: 151.68 LBS

## 2025-08-06 LAB
CHEMOTHERAPY INFUSION START DATE: NORMAL
CHEMOTHERAPY RECORDS: 1.5 GY
CHEMOTHERAPY RECORDS: 3000 CGY
CHEMOTHERAPY RECORDS: NORMAL
CHEMOTHERAPY RX CANCER: NORMAL
DATE 1ST CHEMO CANCER: NORMAL
RAD ONC ARIA COURSE LAST TREATMENT DATE: NORMAL
RAD ONC ARIA COURSE TREATMENT ELAPSED DAYS: NORMAL
RAD ONC ARIA REFERENCE POINT DOSAGE GIVEN TO DATE: 10.5 GY
RAD ONC ARIA REFERENCE POINT ID: NORMAL
RAD ONC ARIA REFERENCE POINT SESSION DOSAGE GIVEN: 1.5 GY

## 2025-08-06 PROCEDURE — 77386 HCHG IMRT DELIVERY COMPLEX: CPT | Performed by: RADIOLOGY

## 2025-08-06 PROCEDURE — 77014 PR CT GUIDANCE PLACEMENT RAD THERAPY FIELDS: CPT | Mod: 26 | Performed by: RADIOLOGY

## 2025-08-06 ASSESSMENT — FIBROSIS 4 INDEX: FIB4 SCORE: 1.26

## 2025-08-07 ENCOUNTER — HOSPITAL ENCOUNTER (OUTPATIENT)
Dept: RADIATION ONCOLOGY | Facility: MEDICAL CENTER | Age: 75
End: 2025-08-07
Payer: MEDICARE

## 2025-08-07 LAB
CHEMOTHERAPY INFUSION START DATE: NORMAL
CHEMOTHERAPY RECORDS: 1.5 GY
CHEMOTHERAPY RECORDS: 3000 CGY
CHEMOTHERAPY RECORDS: NORMAL
CHEMOTHERAPY RX CANCER: NORMAL
DATE 1ST CHEMO CANCER: NORMAL
RAD ONC ARIA COURSE LAST TREATMENT DATE: NORMAL
RAD ONC ARIA COURSE TREATMENT ELAPSED DAYS: NORMAL
RAD ONC ARIA REFERENCE POINT DOSAGE GIVEN TO DATE: 12 GY
RAD ONC ARIA REFERENCE POINT ID: NORMAL
RAD ONC ARIA REFERENCE POINT SESSION DOSAGE GIVEN: 1.5 GY

## 2025-08-07 PROCEDURE — 77014 PR CT GUIDANCE PLACEMENT RAD THERAPY FIELDS: CPT | Mod: 26 | Performed by: RADIOLOGY

## 2025-08-07 PROCEDURE — 77386 HCHG IMRT DELIVERY COMPLEX: CPT | Performed by: RADIOLOGY

## 2025-08-07 PROCEDURE — 77336 RADIATION PHYSICS CONSULT: CPT | Performed by: RADIOLOGY

## 2025-08-08 ENCOUNTER — HOSPITAL ENCOUNTER (OUTPATIENT)
Dept: RADIATION ONCOLOGY | Facility: MEDICAL CENTER | Age: 75
End: 2025-08-08
Payer: MEDICARE

## 2025-08-08 LAB
CHEMOTHERAPY INFUSION START DATE: NORMAL
CHEMOTHERAPY RECORDS: 1.5 GY
CHEMOTHERAPY RECORDS: 3000 CGY
CHEMOTHERAPY RECORDS: NORMAL
CHEMOTHERAPY RX CANCER: NORMAL
DATE 1ST CHEMO CANCER: NORMAL
RAD ONC ARIA COURSE LAST TREATMENT DATE: NORMAL
RAD ONC ARIA COURSE TREATMENT ELAPSED DAYS: NORMAL
RAD ONC ARIA REFERENCE POINT DOSAGE GIVEN TO DATE: 13.5 GY
RAD ONC ARIA REFERENCE POINT ID: NORMAL
RAD ONC ARIA REFERENCE POINT SESSION DOSAGE GIVEN: 1.5 GY

## 2025-08-08 PROCEDURE — 77386 HCHG IMRT DELIVERY COMPLEX: CPT | Performed by: RADIOLOGY

## 2025-08-08 PROCEDURE — 77014 PR CT GUIDANCE PLACEMENT RAD THERAPY FIELDS: CPT | Mod: 26 | Performed by: RADIOLOGY

## 2025-08-11 ENCOUNTER — HOSPITAL ENCOUNTER (OUTPATIENT)
Dept: RADIATION ONCOLOGY | Facility: MEDICAL CENTER | Age: 75
End: 2025-08-11
Payer: MEDICARE

## 2025-08-11 LAB
CHEMOTHERAPY INFUSION START DATE: NORMAL
CHEMOTHERAPY RECORDS: 1.5 GY
CHEMOTHERAPY RECORDS: 3000 CGY
CHEMOTHERAPY RECORDS: NORMAL
CHEMOTHERAPY RX CANCER: NORMAL
DATE 1ST CHEMO CANCER: NORMAL
RAD ONC ARIA COURSE LAST TREATMENT DATE: NORMAL
RAD ONC ARIA COURSE TREATMENT ELAPSED DAYS: NORMAL
RAD ONC ARIA REFERENCE POINT DOSAGE GIVEN TO DATE: 15 GY
RAD ONC ARIA REFERENCE POINT ID: NORMAL
RAD ONC ARIA REFERENCE POINT SESSION DOSAGE GIVEN: 1.5 GY

## 2025-08-11 PROCEDURE — 77014 PR CT GUIDANCE PLACEMENT RAD THERAPY FIELDS: CPT | Mod: 26 | Performed by: RADIOLOGY

## 2025-08-11 PROCEDURE — 77386 HCHG IMRT DELIVERY COMPLEX: CPT | Performed by: RADIOLOGY

## 2025-08-11 PROCEDURE — 77427 RADIATION TX MANAGEMENT X5: CPT | Performed by: RADIOLOGY

## 2025-08-12 ENCOUNTER — HOSPITAL ENCOUNTER (OUTPATIENT)
Dept: RADIATION ONCOLOGY | Facility: MEDICAL CENTER | Age: 75
End: 2025-08-12
Payer: MEDICARE

## 2025-08-12 LAB
CHEMOTHERAPY INFUSION START DATE: NORMAL
CHEMOTHERAPY RECORDS: 1.5 GY
CHEMOTHERAPY RECORDS: 3000 CGY
CHEMOTHERAPY RECORDS: NORMAL
CHEMOTHERAPY RX CANCER: NORMAL
DATE 1ST CHEMO CANCER: NORMAL
RAD ONC ARIA COURSE LAST TREATMENT DATE: NORMAL
RAD ONC ARIA COURSE TREATMENT ELAPSED DAYS: NORMAL
RAD ONC ARIA REFERENCE POINT DOSAGE GIVEN TO DATE: 16.5 GY
RAD ONC ARIA REFERENCE POINT ID: NORMAL
RAD ONC ARIA REFERENCE POINT SESSION DOSAGE GIVEN: 1.5 GY

## 2025-08-12 PROCEDURE — 77386 HCHG IMRT DELIVERY COMPLEX: CPT | Performed by: RADIOLOGY

## 2025-08-12 PROCEDURE — 77014 PR CT GUIDANCE PLACEMENT RAD THERAPY FIELDS: CPT | Mod: 26 | Performed by: RADIOLOGY

## 2025-08-13 ENCOUNTER — HOSPITAL ENCOUNTER (OUTPATIENT)
Dept: RADIATION ONCOLOGY | Facility: MEDICAL CENTER | Age: 75
End: 2025-08-13
Payer: MEDICARE

## 2025-08-13 ENCOUNTER — HOSPITAL ENCOUNTER (OUTPATIENT)
Dept: RADIATION ONCOLOGY | Facility: MEDICAL CENTER | Age: 75
End: 2025-08-13
Attending: RADIOLOGY
Payer: MEDICARE

## 2025-08-13 VITALS
TEMPERATURE: 97 F | WEIGHT: 149.47 LBS | RESPIRATION RATE: 18 BRPM | DIASTOLIC BLOOD PRESSURE: 73 MMHG | BODY MASS INDEX: 23.41 KG/M2 | HEART RATE: 93 BPM | OXYGEN SATURATION: 96 % | SYSTOLIC BLOOD PRESSURE: 130 MMHG

## 2025-08-13 LAB
CHEMOTHERAPY INFUSION START DATE: NORMAL
CHEMOTHERAPY RECORDS: 1.5 GY
CHEMOTHERAPY RECORDS: 3000 CGY
CHEMOTHERAPY RECORDS: NORMAL
CHEMOTHERAPY RX CANCER: NORMAL
DATE 1ST CHEMO CANCER: NORMAL
RAD ONC ARIA COURSE LAST TREATMENT DATE: NORMAL
RAD ONC ARIA COURSE TREATMENT ELAPSED DAYS: NORMAL
RAD ONC ARIA REFERENCE POINT DOSAGE GIVEN TO DATE: 18 GY
RAD ONC ARIA REFERENCE POINT ID: NORMAL
RAD ONC ARIA REFERENCE POINT SESSION DOSAGE GIVEN: 1.5 GY

## 2025-08-13 PROCEDURE — 77014 PR CT GUIDANCE PLACEMENT RAD THERAPY FIELDS: CPT | Mod: 26 | Performed by: RADIOLOGY

## 2025-08-13 PROCEDURE — 77386 HCHG IMRT DELIVERY COMPLEX: CPT | Performed by: RADIOLOGY

## 2025-08-13 ASSESSMENT — PAIN SCALES - GENERAL: PAINLEVEL_OUTOF10: NO PAIN

## 2025-08-13 ASSESSMENT — FIBROSIS 4 INDEX: FIB4 SCORE: 1.26

## 2025-08-14 ENCOUNTER — HOSPITAL ENCOUNTER (OUTPATIENT)
Dept: RADIATION ONCOLOGY | Facility: MEDICAL CENTER | Age: 75
End: 2025-08-14
Payer: MEDICARE

## 2025-08-14 LAB
CHEMOTHERAPY INFUSION START DATE: NORMAL
CHEMOTHERAPY RECORDS: 1.5 GY
CHEMOTHERAPY RECORDS: 3000 CGY
CHEMOTHERAPY RECORDS: NORMAL
CHEMOTHERAPY RX CANCER: NORMAL
DATE 1ST CHEMO CANCER: NORMAL
RAD ONC ARIA COURSE LAST TREATMENT DATE: NORMAL
RAD ONC ARIA COURSE TREATMENT ELAPSED DAYS: NORMAL
RAD ONC ARIA REFERENCE POINT DOSAGE GIVEN TO DATE: 19.5 GY
RAD ONC ARIA REFERENCE POINT ID: NORMAL
RAD ONC ARIA REFERENCE POINT SESSION DOSAGE GIVEN: 1.5 GY

## 2025-08-14 PROCEDURE — 77386 HCHG IMRT DELIVERY COMPLEX: CPT | Performed by: RADIOLOGY

## 2025-08-14 PROCEDURE — 77014 PR CT GUIDANCE PLACEMENT RAD THERAPY FIELDS: CPT | Mod: 26 | Performed by: RADIOLOGY

## 2025-08-14 PROCEDURE — 77336 RADIATION PHYSICS CONSULT: CPT | Performed by: RADIOLOGY

## 2025-08-15 ENCOUNTER — HOSPITAL ENCOUNTER (OUTPATIENT)
Dept: RADIATION ONCOLOGY | Facility: MEDICAL CENTER | Age: 75
End: 2025-08-15
Payer: MEDICARE

## 2025-08-15 LAB
CHEMOTHERAPY INFUSION START DATE: NORMAL
CHEMOTHERAPY RECORDS: 1.5 GY
CHEMOTHERAPY RECORDS: 3000 CGY
CHEMOTHERAPY RECORDS: NORMAL
CHEMOTHERAPY RX CANCER: NORMAL
DATE 1ST CHEMO CANCER: NORMAL
RAD ONC ARIA COURSE LAST TREATMENT DATE: NORMAL
RAD ONC ARIA COURSE TREATMENT ELAPSED DAYS: NORMAL
RAD ONC ARIA REFERENCE POINT DOSAGE GIVEN TO DATE: 21 GY
RAD ONC ARIA REFERENCE POINT ID: NORMAL
RAD ONC ARIA REFERENCE POINT SESSION DOSAGE GIVEN: 1.5 GY

## 2025-08-15 PROCEDURE — 77014 PR CT GUIDANCE PLACEMENT RAD THERAPY FIELDS: CPT | Mod: 26 | Performed by: RADIOLOGY

## 2025-08-15 PROCEDURE — 77386 HCHG IMRT DELIVERY COMPLEX: CPT | Performed by: RADIOLOGY

## 2025-08-18 ENCOUNTER — HOSPITAL ENCOUNTER (OUTPATIENT)
Dept: RADIATION ONCOLOGY | Facility: MEDICAL CENTER | Age: 75
End: 2025-08-18
Payer: MEDICARE

## 2025-08-18 LAB
CHEMOTHERAPY INFUSION START DATE: NORMAL
CHEMOTHERAPY RECORDS: 1.5 GY
CHEMOTHERAPY RECORDS: 3000 CGY
CHEMOTHERAPY RECORDS: NORMAL
CHEMOTHERAPY RX CANCER: NORMAL
DATE 1ST CHEMO CANCER: NORMAL
RAD ONC ARIA COURSE LAST TREATMENT DATE: NORMAL
RAD ONC ARIA COURSE TREATMENT ELAPSED DAYS: NORMAL
RAD ONC ARIA REFERENCE POINT DOSAGE GIVEN TO DATE: 22.5 GY
RAD ONC ARIA REFERENCE POINT ID: NORMAL
RAD ONC ARIA REFERENCE POINT SESSION DOSAGE GIVEN: 1.5 GY

## 2025-08-18 PROCEDURE — 77427 RADIATION TX MANAGEMENT X5: CPT | Performed by: RADIOLOGY

## 2025-08-18 PROCEDURE — 77386 HCHG IMRT DELIVERY COMPLEX: CPT | Performed by: RADIOLOGY

## 2025-08-18 PROCEDURE — 77014 PR CT GUIDANCE PLACEMENT RAD THERAPY FIELDS: CPT | Mod: 26 | Performed by: RADIOLOGY

## 2025-08-19 ENCOUNTER — TELEPHONE (OUTPATIENT)
Dept: RADIATION ONCOLOGY | Facility: MEDICAL CENTER | Age: 75
End: 2025-08-19
Payer: MEDICARE

## 2025-08-19 ENCOUNTER — HOSPITAL ENCOUNTER (OUTPATIENT)
Dept: RADIATION ONCOLOGY | Facility: MEDICAL CENTER | Age: 75
End: 2025-08-19
Attending: RADIOLOGY
Payer: MEDICARE

## 2025-08-19 ENCOUNTER — HOSPITAL ENCOUNTER (OUTPATIENT)
Dept: RADIATION ONCOLOGY | Facility: MEDICAL CENTER | Age: 75
End: 2025-08-19
Payer: MEDICARE

## 2025-08-19 VITALS
SYSTOLIC BLOOD PRESSURE: 125 MMHG | WEIGHT: 150.79 LBS | RESPIRATION RATE: 16 BRPM | HEART RATE: 97 BPM | BODY MASS INDEX: 23.62 KG/M2 | DIASTOLIC BLOOD PRESSURE: 80 MMHG | OXYGEN SATURATION: 97 % | TEMPERATURE: 97.5 F

## 2025-08-19 LAB
CHEMOTHERAPY INFUSION START DATE: NORMAL
CHEMOTHERAPY RECORDS: 1.5 GY
CHEMOTHERAPY RECORDS: 3000 CGY
CHEMOTHERAPY RECORDS: NORMAL
CHEMOTHERAPY RX CANCER: NORMAL
DATE 1ST CHEMO CANCER: NORMAL
RAD ONC ARIA COURSE LAST TREATMENT DATE: NORMAL
RAD ONC ARIA COURSE TREATMENT ELAPSED DAYS: NORMAL
RAD ONC ARIA REFERENCE POINT DOSAGE GIVEN TO DATE: 24 GY
RAD ONC ARIA REFERENCE POINT ID: NORMAL
RAD ONC ARIA REFERENCE POINT SESSION DOSAGE GIVEN: 1.5 GY

## 2025-08-19 PROCEDURE — 77014 PR CT GUIDANCE PLACEMENT RAD THERAPY FIELDS: CPT | Mod: 26 | Performed by: RADIOLOGY

## 2025-08-19 PROCEDURE — 77386 HCHG IMRT DELIVERY COMPLEX: CPT | Performed by: RADIOLOGY

## 2025-08-19 ASSESSMENT — PAIN SCALES - GENERAL: PAINLEVEL_OUTOF10: NO PAIN

## 2025-08-19 ASSESSMENT — FIBROSIS 4 INDEX: FIB4 SCORE: 1.26

## 2025-08-20 ENCOUNTER — PATIENT OUTREACH (OUTPATIENT)
Dept: ONCOLOGY | Facility: MEDICAL CENTER | Age: 75
End: 2025-08-20
Payer: MEDICARE

## 2025-08-20 ENCOUNTER — PHARMACY VISIT (OUTPATIENT)
Dept: PHARMACY | Facility: MEDICAL CENTER | Age: 75
End: 2025-08-20
Payer: MEDICARE

## 2025-08-20 ENCOUNTER — HOSPITAL ENCOUNTER (OUTPATIENT)
Dept: RADIATION ONCOLOGY | Facility: MEDICAL CENTER | Age: 75
End: 2025-08-20
Payer: MEDICARE

## 2025-08-20 LAB
CHEMOTHERAPY INFUSION START DATE: NORMAL
CHEMOTHERAPY RECORDS: 1.5 GY
CHEMOTHERAPY RECORDS: 3000 CGY
CHEMOTHERAPY RECORDS: NORMAL
CHEMOTHERAPY RX CANCER: NORMAL
DATE 1ST CHEMO CANCER: NORMAL
RAD ONC ARIA COURSE LAST TREATMENT DATE: NORMAL
RAD ONC ARIA COURSE TREATMENT ELAPSED DAYS: NORMAL
RAD ONC ARIA REFERENCE POINT DOSAGE GIVEN TO DATE: 25.5 GY
RAD ONC ARIA REFERENCE POINT ID: NORMAL
RAD ONC ARIA REFERENCE POINT SESSION DOSAGE GIVEN: 1.5 GY

## 2025-08-20 PROCEDURE — 77014 PR CT GUIDANCE PLACEMENT RAD THERAPY FIELDS: CPT | Mod: 26 | Performed by: RADIOLOGY

## 2025-08-20 PROCEDURE — 77386 HCHG IMRT DELIVERY COMPLEX: CPT | Performed by: RADIOLOGY

## 2025-08-20 PROCEDURE — RXOTC WILLOW AMBULATORY OTC CHARGE

## 2025-08-21 ENCOUNTER — HOSPITAL ENCOUNTER (OUTPATIENT)
Dept: RADIATION ONCOLOGY | Facility: MEDICAL CENTER | Age: 75
End: 2025-08-21

## 2025-08-21 ENCOUNTER — TELEPHONE (OUTPATIENT)
Dept: HEMATOLOGY ONCOLOGY | Facility: MEDICAL CENTER | Age: 75
End: 2025-08-21
Payer: MEDICARE

## 2025-08-21 ENCOUNTER — OUTPATIENT INFUSION SERVICES (OUTPATIENT)
Dept: ONCOLOGY | Facility: MEDICAL CENTER | Age: 75
End: 2025-08-21
Attending: NURSE PRACTITIONER
Payer: MEDICARE

## 2025-08-21 VITALS
HEART RATE: 75 BPM | RESPIRATION RATE: 18 BRPM | DIASTOLIC BLOOD PRESSURE: 78 MMHG | SYSTOLIC BLOOD PRESSURE: 131 MMHG | OXYGEN SATURATION: 97 % | TEMPERATURE: 97.3 F

## 2025-08-21 DIAGNOSIS — D62 ACUTE BLOOD LOSS ANEMIA: Primary | ICD-10-CM

## 2025-08-21 LAB
CHEMOTHERAPY INFUSION START DATE: NORMAL
CHEMOTHERAPY RECORDS: 1.5 GY
CHEMOTHERAPY RECORDS: 3000 CGY
CHEMOTHERAPY RECORDS: NORMAL
CHEMOTHERAPY RX CANCER: NORMAL
DATE 1ST CHEMO CANCER: NORMAL
FERRITIN SERPL-MCNC: 77.3 NG/ML (ref 22–322)
IRON SATN MFR SERPL: 37 % (ref 15–55)
IRON SERPL-MCNC: 88 UG/DL (ref 50–180)
RAD ONC ARIA COURSE LAST TREATMENT DATE: NORMAL
RAD ONC ARIA COURSE TREATMENT ELAPSED DAYS: NORMAL
RAD ONC ARIA REFERENCE POINT DOSAGE GIVEN TO DATE: 27 GY
RAD ONC ARIA REFERENCE POINT ID: NORMAL
RAD ONC ARIA REFERENCE POINT SESSION DOSAGE GIVEN: 1.5 GY
TIBC SERPL-MCNC: 236 UG/DL (ref 250–450)
UIBC SERPL-MCNC: 148 UG/DL (ref 110–370)

## 2025-08-21 PROCEDURE — 83550 IRON BINDING TEST: CPT

## 2025-08-21 PROCEDURE — 83540 ASSAY OF IRON: CPT

## 2025-08-21 PROCEDURE — 77386 HCHG IMRT DELIVERY COMPLEX: CPT | Performed by: RADIOLOGY

## 2025-08-21 PROCEDURE — 82728 ASSAY OF FERRITIN: CPT

## 2025-08-21 PROCEDURE — 77336 RADIATION PHYSICS CONSULT: CPT | Performed by: RADIOLOGY

## 2025-08-21 PROCEDURE — 36415 COLL VENOUS BLD VENIPUNCTURE: CPT

## 2025-08-21 PROCEDURE — 77014 PR CT GUIDANCE PLACEMENT RAD THERAPY FIELDS: CPT | Mod: 26 | Performed by: RADIOLOGY

## 2025-08-21 RX ORDER — LORAZEPAM 1 MG/1
0.5 TABLET ORAL PRN
OUTPATIENT
Start: 2025-08-21

## 2025-08-21 RX ORDER — METHYLPREDNISOLONE SODIUM SUCCINATE 125 MG/2ML
125 INJECTION, POWDER, LYOPHILIZED, FOR SOLUTION INTRAMUSCULAR; INTRAVENOUS PRN
OUTPATIENT
Start: 2025-08-21

## 2025-08-21 RX ORDER — ALBUTEROL SULFATE 5 MG/ML
2.5 SOLUTION RESPIRATORY (INHALATION) PRN
OUTPATIENT
Start: 2025-08-21

## 2025-08-21 RX ORDER — MEPERIDINE HYDROCHLORIDE 25 MG/ML
25 INJECTION INTRAMUSCULAR; INTRAVENOUS; SUBCUTANEOUS PRN
OUTPATIENT
Start: 2025-08-21

## 2025-08-21 RX ORDER — SODIUM CHLORIDE 9 MG/ML
1000 INJECTION, SOLUTION INTRAVENOUS PRN
OUTPATIENT
Start: 2025-08-21

## 2025-08-21 RX ORDER — DIPHENHYDRAMINE HYDROCHLORIDE 50 MG/ML
25 INJECTION, SOLUTION INTRAMUSCULAR; INTRAVENOUS PRN
OUTPATIENT
Start: 2025-08-21

## 2025-08-21 RX ORDER — ONDANSETRON 2 MG/ML
8 INJECTION INTRAMUSCULAR; INTRAVENOUS PRN
OUTPATIENT
Start: 2025-08-21

## 2025-08-21 RX ORDER — LORAZEPAM 1 MG/1
1 TABLET ORAL EVERY 4 HOURS PRN
Start: 2025-08-21

## 2025-08-21 RX ORDER — EPINEPHRINE 1 MG/ML(1)
0.5 AMPUL (ML) INJECTION PRN
OUTPATIENT
Start: 2025-08-21

## 2025-08-21 RX ORDER — ACETAMINOPHEN 325 MG/1
650 TABLET ORAL PRN
OUTPATIENT
Start: 2025-08-21

## 2025-08-21 RX ORDER — ONDANSETRON 8 MG/1
8 TABLET, ORALLY DISINTEGRATING ORAL PRN
OUTPATIENT
Start: 2025-08-21

## 2025-08-21 RX ORDER — SODIUM CHLORIDE 9 MG/ML
INJECTION, SOLUTION INTRAVENOUS CONTINUOUS
OUTPATIENT
Start: 2025-08-21

## 2025-08-22 ENCOUNTER — PATIENT OUTREACH (OUTPATIENT)
Dept: ONCOLOGY | Facility: MEDICAL CENTER | Age: 75
End: 2025-08-22
Payer: MEDICARE

## 2025-08-22 ENCOUNTER — HOSPITAL ENCOUNTER (OUTPATIENT)
Dept: RADIATION ONCOLOGY | Facility: MEDICAL CENTER | Age: 75
End: 2025-08-22
Payer: MEDICARE

## 2025-08-22 DIAGNOSIS — D50.8 OTHER IRON DEFICIENCY ANEMIA: Primary | ICD-10-CM

## 2025-08-22 LAB
CHEMOTHERAPY INFUSION START DATE: NORMAL
CHEMOTHERAPY RECORDS: 1.5 GY
CHEMOTHERAPY RECORDS: 3000 CGY
CHEMOTHERAPY RECORDS: NORMAL
CHEMOTHERAPY RX CANCER: NORMAL
DATE 1ST CHEMO CANCER: NORMAL
RAD ONC ARIA COURSE LAST TREATMENT DATE: NORMAL
RAD ONC ARIA COURSE TREATMENT ELAPSED DAYS: NORMAL
RAD ONC ARIA REFERENCE POINT DOSAGE GIVEN TO DATE: 28.5 GY
RAD ONC ARIA REFERENCE POINT ID: NORMAL
RAD ONC ARIA REFERENCE POINT SESSION DOSAGE GIVEN: 1.5 GY

## 2025-08-22 PROCEDURE — 77014 PR CT GUIDANCE PLACEMENT RAD THERAPY FIELDS: CPT | Mod: 26 | Performed by: RADIOLOGY

## 2025-08-22 PROCEDURE — 77386 HCHG IMRT DELIVERY COMPLEX: CPT | Performed by: RADIOLOGY

## 2025-08-25 ENCOUNTER — HOSPITAL ENCOUNTER (OUTPATIENT)
Dept: RADIATION ONCOLOGY | Facility: MEDICAL CENTER | Age: 75
End: 2025-08-25
Payer: MEDICARE

## 2025-08-25 LAB
CHEMOTHERAPY INFUSION START DATE: NORMAL
CHEMOTHERAPY RECORDS: 1.5 GY
CHEMOTHERAPY RECORDS: 3000 CGY
CHEMOTHERAPY RECORDS: NORMAL
CHEMOTHERAPY RX CANCER: NORMAL
DATE 1ST CHEMO CANCER: NORMAL
RAD ONC ARIA COURSE LAST TREATMENT DATE: NORMAL
RAD ONC ARIA COURSE TREATMENT ELAPSED DAYS: NORMAL
RAD ONC ARIA REFERENCE POINT DOSAGE GIVEN TO DATE: 30 GY
RAD ONC ARIA REFERENCE POINT ID: NORMAL
RAD ONC ARIA REFERENCE POINT SESSION DOSAGE GIVEN: 1.5 GY

## 2025-08-25 PROCEDURE — 77386 HCHG IMRT DELIVERY COMPLEX: CPT | Performed by: RADIOLOGY

## 2025-08-25 PROCEDURE — 77427 RADIATION TX MANAGEMENT X5: CPT | Performed by: RADIOLOGY

## 2025-08-25 PROCEDURE — 77014 PR CT GUIDANCE PLACEMENT RAD THERAPY FIELDS: CPT | Mod: 26 | Performed by: RADIOLOGY

## (undated) DEVICE — PROTECTOR ULNA NERVE - (36PR/CA)

## (undated) DEVICE — TUBING CLEARLINK DUO-VENT - C-FLO (48EA/CA)

## (undated) DEVICE — ELECTRODE DUAL RETURN W/ CORD - (50/PK)

## (undated) DEVICE — TOWEL STOP TIMEOUT SAFETY FLAG (40EA/CA)

## (undated) DEVICE — HEAD HOLDER JUNIOR/ADULT

## (undated) DEVICE — SODIUM CHL. IRRIGATION 0.9% 3000ML (4EA/CA 65CA/PF)

## (undated) DEVICE — CANISTER SUCTION RIGID RED 1500CC (40EA/CA)

## (undated) DEVICE — BUTTON ENDOSCOPY DISPOSABLE

## (undated) DEVICE — CATHETER URETHRAL FOLEY SILICONE 22 FR 30 ML 3-WAY

## (undated) DEVICE — WATER IRRIGATION STERILE 1000ML (12EA/CA)

## (undated) DEVICE — TUBE CONNECTING SUCTION - CLEAR PLASTIC STERILE 72 IN (50EA/CA)

## (undated) DEVICE — SYRINGE 30 ML LL (56/BX)

## (undated) DEVICE — CONNECTOR HOSE NEPTUNE FOR CYSTO ROOM

## (undated) DEVICE — SENSOR OXIMETER ADULT SPO2 RD SET (20EA/BX)

## (undated) DEVICE — EVACUATOR BLADDER ELLIK - (10/BX)

## (undated) DEVICE — FILM CASSETTE ENDO

## (undated) DEVICE — SUCTION INSTRUMENT YANKAUER BULBOUS TIP W/O VENT (50EA/CA)

## (undated) DEVICE — GLOVE BIOGEL SZ 7.5 SURGICAL PF LTX - (50PR/BX 4BX/CA)

## (undated) DEVICE — SET EXTENSION WITH 2 PORTS (48EA/CA) ***PART #2C8610 IS A SUBSTITUTE*****

## (undated) DEVICE — ELECTRODE BIPOLAR REGULAR CUTTING LOOP URO 24/26 FR (6EA/PK)

## (undated) DEVICE — KIT ANESTHESIA W/CIRCUIT & 3/LT BAG W/FILTER (20EA/CA)

## (undated) DEVICE — NEPTUNE 4 PORT MANIFOLD - (20/PK)

## (undated) DEVICE — SODIUM CHL IRRIGATION 0.9% 1000ML (12EA/CA)

## (undated) DEVICE — ELECTRODE 850 FOAM ADHESIVE - HYDROGEL RADIOTRNSPRNT (50/PK)

## (undated) DEVICE — SET LEADWIRE 5 LEAD BEDSIDE DISPOSABLE ECG (1SET OF 5/EA)

## (undated) DEVICE — GOWN WARMING STANDARD FLEX - (30/CA)

## (undated) DEVICE — MASK AIRWAY SIZE 4 UNIQUE SILICON (10EA/BX)

## (undated) DEVICE — KIT CUSTOM PROCEDURE SINGLE FOR ENDO (15/CA)

## (undated) DEVICE — PORT AUXILLARY WATER (50EA/BX)

## (undated) DEVICE — BAG DRAINAGE ANTIREFLUX TOWER SLIDE TAP 4000 ML (20EA/CA)

## (undated) DEVICE — CONTAINER SPECIMEN BAG OR - STERILE 4 OZ W/LID (100EA/CA)

## (undated) DEVICE — FORCEP RADIAL JAW 4 STANDARD CAPACITY W/NEEDLE 240CM (40EA/BX)

## (undated) DEVICE — MASK ANESTHESIA ADULT  - (100/CA)

## (undated) DEVICE — SPONGE GAUZESTER 4 X 4 4PLY - (128PK/CA)

## (undated) DEVICE — TOWELS CLOTH SURGICAL - (4/PK 20PK/CA)

## (undated) DEVICE — SLEEVE VASO CALF MED - (10PR/CA)

## (undated) DEVICE — SENSOR SPO2 NEO LNCS ADHESIVE (20/BX) SEE USER NOTES

## (undated) DEVICE — LACTATED RINGERS INJ 1000 ML - (14EA/CA 60CA/PF)

## (undated) DEVICE — PACK SINGLE BASIN - (6/CA)

## (undated) DEVICE — CONTAINER, SPECIMEN, STERILE

## (undated) DEVICE — ELECTRODE BIPOLAR COAGULATION BALL YELLOW 24 FR (6EA/PK)

## (undated) DEVICE — MASK PANORAMIC OXYGEN PRO2 (30EA/CA)

## (undated) DEVICE — BLOCK BITE MAXI DENTAL RETENTION RIM (100EA/BX)

## (undated) DEVICE — KIT  I.V. START (100EA/CA)